# Patient Record
Sex: FEMALE | Race: BLACK OR AFRICAN AMERICAN | Employment: FULL TIME | URBAN - METROPOLITAN AREA
[De-identification: names, ages, dates, MRNs, and addresses within clinical notes are randomized per-mention and may not be internally consistent; named-entity substitution may affect disease eponyms.]

---

## 2018-12-20 ENCOUNTER — APPOINTMENT (OUTPATIENT)
Dept: CT IMAGING | Facility: HOSPITAL | Age: 23
End: 2018-12-20
Attending: EMERGENCY MEDICINE
Payer: COMMERCIAL

## 2018-12-20 ENCOUNTER — HOSPITAL ENCOUNTER (EMERGENCY)
Facility: HOSPITAL | Age: 23
Discharge: HOME | End: 2018-12-20
Attending: EMERGENCY MEDICINE
Payer: COMMERCIAL

## 2018-12-20 ENCOUNTER — HOSPITAL ENCOUNTER (OUTPATIENT)
Age: 23
Discharge: ANOTHER HOSPITAL | End: 2018-12-20
Attending: EMERGENCY MEDICINE
Payer: COMMERCIAL

## 2018-12-20 ENCOUNTER — APPOINTMENT (OUTPATIENT)
Dept: GENERAL RADIOLOGY | Age: 23
End: 2018-12-20
Attending: NURSE PRACTITIONER
Payer: COMMERCIAL

## 2018-12-20 VITALS
DIASTOLIC BLOOD PRESSURE: 80 MMHG | SYSTOLIC BLOOD PRESSURE: 116 MMHG | RESPIRATION RATE: 16 BRPM | HEIGHT: 63 IN | TEMPERATURE: 99 F | HEART RATE: 76 BPM | WEIGHT: 160 LBS | OXYGEN SATURATION: 100 % | BODY MASS INDEX: 28.35 KG/M2

## 2018-12-20 VITALS
HEIGHT: 64 IN | SYSTOLIC BLOOD PRESSURE: 129 MMHG | DIASTOLIC BLOOD PRESSURE: 40 MMHG | HEART RATE: 74 BPM | WEIGHT: 160 LBS | BODY MASS INDEX: 27.31 KG/M2 | TEMPERATURE: 98 F | OXYGEN SATURATION: 100 % | RESPIRATION RATE: 20 BRPM

## 2018-12-20 DIAGNOSIS — R07.9 ACUTE CHEST PAIN: Primary | ICD-10-CM

## 2018-12-20 DIAGNOSIS — R09.1 PLEURISY: Primary | ICD-10-CM

## 2018-12-20 PROCEDURE — 85025 COMPLETE CBC W/AUTO DIFF WBC: CPT | Performed by: NURSE PRACTITIONER

## 2018-12-20 PROCEDURE — 99284 EMERGENCY DEPT VISIT MOD MDM: CPT

## 2018-12-20 PROCEDURE — 85378 FIBRIN DEGRADE SEMIQUANT: CPT | Performed by: NURSE PRACTITIONER

## 2018-12-20 PROCEDURE — 93010 ELECTROCARDIOGRAM REPORT: CPT

## 2018-12-20 PROCEDURE — 93005 ELECTROCARDIOGRAM TRACING: CPT

## 2018-12-20 PROCEDURE — 71046 X-RAY EXAM CHEST 2 VIEWS: CPT | Performed by: NURSE PRACTITIONER

## 2018-12-20 PROCEDURE — 71275 CT ANGIOGRAPHY CHEST: CPT | Performed by: EMERGENCY MEDICINE

## 2018-12-20 PROCEDURE — 81025 URINE PREGNANCY TEST: CPT | Performed by: NURSE PRACTITIONER

## 2018-12-20 PROCEDURE — 36415 COLL VENOUS BLD VENIPUNCTURE: CPT

## 2018-12-20 PROCEDURE — 99205 OFFICE O/P NEW HI 60 MIN: CPT

## 2018-12-20 PROCEDURE — 80047 BASIC METABLC PNL IONIZED CA: CPT

## 2018-12-20 RX ORDER — IBUPROFEN 200 MG
400 TABLET ORAL ONCE
Status: COMPLETED | OUTPATIENT
Start: 2018-12-20 | End: 2018-12-20

## 2018-12-21 NOTE — ED PROVIDER NOTES
Patient Seen in: [facility]    History   Patient presents with:  Chest Pain Angina (cardiovascular)    Stated Complaint: chest pain    HPI    21-year-old female with no medical history presents with left-sided chest pain that is of motion. Neck supple. Cardiovascular: Normal rate, regular rhythm, normal heart sounds and intact distal pulses. No murmur heard. Pulmonary/Chest: Effort normal and breath sounds normal. No accessory muscle usage. No respiratory distress.    Abdomina

## 2018-12-21 NOTE — ED PROVIDER NOTES
Patient Seen in: [facility] Emergency Department    History   Patient presents with:  Abnormal Result (metabolic, cardiac)    Stated Complaint: abnormal results    HPI    29-year-old female presents here from the [facility] immediate care due to re adenopathy. Lungs: Clear to auscultation bilaterally. No wheezes, rhonchi, or rales appreciated. No accessory muscle use noted for breathing. Cardiac: Regular rate and rhythm.   Normal S1 and 2 without murmurs or ectopy appreciated  Abdomen: Soft on exa Prescribed:  There are no discharge medications for this patient.

## 2018-12-21 NOTE — ED PROVIDER NOTES
I reviewed that chart and discussed the case. I have examined the patient and noted skin rash most likely consistent with pityriasis rosea. There is no reproducible discomfort to palpation of her chest wall.   However when you range her left upper extremi

## 2018-12-21 NOTE — ED INITIAL ASSESSMENT (HPI)
Patient states left constant chest pain since yesterday  States underneath her left breast  Hurts to take a deep breath

## 2018-12-21 NOTE — ED INITIAL ASSESSMENT (HPI)
Pt sent over from [facility] for positive D-dimer. Went to IC for left sided chest pain since yesterday. Reports plane ride to [place] this past month.      Denies shortness of breath

## 2019-05-24 ENCOUNTER — APPOINTMENT (OUTPATIENT)
Dept: CT IMAGING | Facility: HOSPITAL | Age: 24
End: 2019-05-24
Attending: EMERGENCY MEDICINE
Payer: COMMERCIAL

## 2019-05-24 ENCOUNTER — HOSPITAL ENCOUNTER (OUTPATIENT)
Age: 24
Discharge: ANOTHER HOSPITAL | End: 2019-05-24
Attending: FAMILY MEDICINE
Payer: COMMERCIAL

## 2019-05-24 ENCOUNTER — HOSPITAL ENCOUNTER (EMERGENCY)
Facility: HOSPITAL | Age: 24
Discharge: HOME | End: 2019-05-24
Payer: COMMERCIAL

## 2019-05-24 ENCOUNTER — APPOINTMENT (OUTPATIENT)
Dept: GENERAL RADIOLOGY | Age: 24
End: 2019-05-24
Attending: PHYSICIAN ASSISTANT
Payer: COMMERCIAL

## 2019-05-24 VITALS
RESPIRATION RATE: 18 BRPM | HEIGHT: 64 IN | DIASTOLIC BLOOD PRESSURE: 67 MMHG | TEMPERATURE: 97 F | SYSTOLIC BLOOD PRESSURE: 101 MMHG | HEART RATE: 85 BPM | OXYGEN SATURATION: 100 % | BODY MASS INDEX: 28.68 KG/M2 | WEIGHT: 168 LBS

## 2019-05-24 VITALS
WEIGHT: 170 LBS | HEIGHT: 63.25 IN | SYSTOLIC BLOOD PRESSURE: 113 MMHG | BODY MASS INDEX: 29.75 KG/M2 | TEMPERATURE: 98 F | DIASTOLIC BLOOD PRESSURE: 70 MMHG | RESPIRATION RATE: 24 BRPM | HEART RATE: 80 BPM | OXYGEN SATURATION: 100 %

## 2019-05-24 DIAGNOSIS — R59.0 AXILLARY LYMPHADENOPATHY: ICD-10-CM

## 2019-05-24 DIAGNOSIS — R79.89 ELEVATED D-DIMER: ICD-10-CM

## 2019-05-24 DIAGNOSIS — R07.89 CHEST PAIN, ATYPICAL: Primary | ICD-10-CM

## 2019-05-24 DIAGNOSIS — R59.1 LYMPHADENOPATHY: ICD-10-CM

## 2019-05-24 DIAGNOSIS — R07.9 CHEST PAIN OF UNCERTAIN ETIOLOGY: Primary | ICD-10-CM

## 2019-05-24 PROCEDURE — 36415 COLL VENOUS BLD VENIPUNCTURE: CPT

## 2019-05-24 PROCEDURE — 99284 EMERGENCY DEPT VISIT MOD MDM: CPT

## 2019-05-24 PROCEDURE — 71046 X-RAY EXAM CHEST 2 VIEWS: CPT | Performed by: PHYSICIAN ASSISTANT

## 2019-05-24 PROCEDURE — 85025 COMPLETE CBC W/AUTO DIFF WBC: CPT | Performed by: PHYSICIAN ASSISTANT

## 2019-05-24 PROCEDURE — 93005 ELECTROCARDIOGRAM TRACING: CPT

## 2019-05-24 PROCEDURE — 80047 BASIC METABLC PNL IONIZED CA: CPT

## 2019-05-24 PROCEDURE — 99292 CRITICAL CARE ADDL 30 MIN: CPT

## 2019-05-24 PROCEDURE — 99215 OFFICE O/P EST HI 40 MIN: CPT

## 2019-05-24 PROCEDURE — 71275 CT ANGIOGRAPHY CHEST: CPT | Performed by: EMERGENCY MEDICINE

## 2019-05-24 PROCEDURE — 81025 URINE PREGNANCY TEST: CPT | Performed by: PHYSICIAN ASSISTANT

## 2019-05-24 PROCEDURE — 85378 FIBRIN DEGRADE SEMIQUANT: CPT | Performed by: PHYSICIAN ASSISTANT

## 2019-05-24 PROCEDURE — 84484 ASSAY OF TROPONIN QUANT: CPT

## 2019-05-24 PROCEDURE — 93010 ELECTROCARDIOGRAM REPORT: CPT

## 2019-05-24 RX ORDER — FAMOTIDINE 20 MG/1
20 TABLET ORAL 2 TIMES DAILY
Qty: 14 TABLET | Refills: 0 | Status: SHIPPED | OUTPATIENT
Start: 2019-05-24 | End: 2019-05-31

## 2019-05-24 RX ORDER — ASPIRIN 81 MG/1
TABLET, CHEWABLE ORAL DAILY
COMMUNITY
End: 2019-05-24

## 2019-05-24 RX ORDER — IBUPROFEN 600 MG/1
600 TABLET ORAL EVERY 8 HOURS PRN
Qty: 10 TABLET | Refills: 0 | Status: ON HOLD | OUTPATIENT
Start: 2019-05-24 | End: 2019-09-20

## 2019-05-24 NOTE — ED PROVIDER NOTES
Patient Seen in: [facility]    History   Patient presents with:  Chest Pain: Lt.-RIB    Stated Complaint: TL-Rib Pain    HPI    Patient is a 19-year-old female.   7 months prior to arrival, patient explains that she was evaluated Device None (Room air)       Current:/70   Pulse 88   Temp 97.7 °F (36.5 °C) (Temporal)   Resp 24   Ht 160.7 cm (5' 3.25\")   Wt 77.1 kg   LMP 05/14/2019 (Approximate)   SpO2 100%   BMI 29.88 kg/m²         Physical Exam    Gen: Well groomed, sitting 81  Rhythm: Sinus Rhythm  Reading: Normal sinus rhythm.   No acute ischemic changes noted         Xr Chest Pa + Lat Chest (cpt=71046)    Result Date: 5/24/2019  PROCEDURE:  XR CHEST PA + LAT CHEST (CPT=71046)  INDICATIONS:  TL-Rib Pain  COMPARISON:  BOLINGB and lymphadenopathy    Please immediately report to the [facility] emergency department.   Failure to receive higher level evaluation and care could result in permanent disability and/or death  Disposition and Plan     Clinical Impression:  Chest jasper

## 2019-05-24 NOTE — ED INITIAL ASSESSMENT (HPI)
Pt. Reports 1 week of Lt. Chest/rib pains. Constant, but certain movements and activties make it worse. States she was seen in the past 1-2 years for chest pain, had a bunch of testing, diagnosed with pleurisy.    No recent long car rides or airplane travel

## 2019-05-25 NOTE — ED PROVIDER NOTES
Patient Seen in: [facility] Emergency Department    History   Patient presents with:  Abnormal Result (metabolic, cardiac)    Stated Complaint: elevated d-dimer    HPI    62-year-old female is in the emergency department at [facility] after she Temporal   SpO2 100 %   O2 Device None (Room air)       Current:/75   Pulse 83   Temp 97.4 °F (36.3 °C) (Temporal)   Resp 16   Ht 162.6 cm (5' 4\")   Wt 76.2 kg   LMP 05/14/2019 (Approximate)   SpO2 100%   BMI 28.84 kg/m²         Physical Exam    GEN Patient complains of pain under her left breast for a week with a productive cough. Nonsmoker. FINDINGS:  LUNGS:  Airspace opacities at both lung bases. CARDIAC:  Normal size cardiac silhouette.  MEDIASTINUM:  Normal. PLEURA:  Normal.  No pleural effusio evidence of central pulmonary embolus. 2. Enlarged adenopathy, specifically numerous enlarged left axillary and mediastinal/hilar lymph nodes are seen. The dominant lymph node within the left axilla is more prominent.   These require appropriate clinical f [facility]  [phone]    Return to ER for worsened or new symptoms.         Medications Prescribed:  Current Discharge Medication List    START taking these medications    ibuprofen 600 MG Oral Tab  Take 1 tablet (600

## 2019-05-25 NOTE — ED INITIAL ASSESSMENT (HPI)
Referral from [facility] due to elevated D dimer. Went to IC due L sided chest pain x 1 week intermittent, tightness. Denies any SOB. + mild productive coughing.  Pain to chest with certain position

## 2019-06-08 ENCOUNTER — HOSPITAL ENCOUNTER (OUTPATIENT)
Dept: CT IMAGING | Age: 24
Discharge: HOME | End: 2019-06-08
Attending: NURSE PRACTITIONER
Payer: COMMERCIAL

## 2019-06-08 DIAGNOSIS — M79.10 MYALGIA: ICD-10-CM

## 2019-06-08 PROCEDURE — 71260 CT THORAX DX C+: CPT | Performed by: NURSE PRACTITIONER

## 2019-06-08 PROCEDURE — 82565 ASSAY OF CREATININE: CPT

## 2019-06-08 PROCEDURE — 74177 CT ABD & PELVIS W/CONTRAST: CPT | Performed by: NURSE PRACTITIONER

## 2019-06-28 ENCOUNTER — OFFICE VISIT (OUTPATIENT)
Dept: SURGERY | Facility: CLINIC | Age: 24
End: 2019-06-28
Payer: COMMERCIAL

## 2019-06-28 VITALS
HEART RATE: 99 BPM | WEIGHT: 170 LBS | DIASTOLIC BLOOD PRESSURE: 74 MMHG | BODY MASS INDEX: 29 KG/M2 | SYSTOLIC BLOOD PRESSURE: 119 MMHG | TEMPERATURE: 99 F

## 2019-06-28 DIAGNOSIS — R59.9 ENLARGED LYMPH NODE: Primary | ICD-10-CM

## 2019-06-28 PROCEDURE — 99243 OFF/OP CNSLTJ NEW/EST LOW 30: CPT | Performed by: SURGERY

## 2019-06-28 NOTE — H&P
New Patient Visit Note       Active Problems      1.  Enlarged lymph node        Chief Complaint   Patient presents with:  Lymph Node: referred by Dr. Brandy Oglesby had Ct showed enlarged Lymph nodes, chest,abdomen,pelvis, axilla      History of Present Illness   Pt iliac bifurcation on   image 173 measures 13 x 12 mm. BOWEL/MESENTERY:  No acute process. ABDOMINAL WALL:  Unremarkable. URINARY BLADDER:  Unremarkable.       PELVIC NODES:  Pelvic sidewall enlarged lymph nodes including on the right image 191 file.  History reviewed. No pertinent surgical history. The family history and social history have been reviewed by me today.     Family History   Problem Relation Age of Onset   • Asthma Mother    • Allergies Mother         fam hx     Social History Psychiatric/Behavioral: Negative for behavioral problems and sleep disturbance.        Physical Findings   /74   Pulse 99   Temp 98.9 °F (37.2 °C) (Oral)   Wt 170 lb   LMP 06/07/2019 (Exact Date)   BMI 29.18 kg/m²   Physical Exam   Constitutional: S

## 2019-08-11 ENCOUNTER — HOSPITAL ENCOUNTER (EMERGENCY)
Facility: HOSPITAL | Age: 24
Discharge: HOME | End: 2019-08-11
Payer: COMMERCIAL

## 2019-08-11 ENCOUNTER — APPOINTMENT (OUTPATIENT)
Dept: CT IMAGING | Facility: HOSPITAL | Age: 24
End: 2019-08-11
Payer: COMMERCIAL

## 2019-08-11 VITALS
SYSTOLIC BLOOD PRESSURE: 108 MMHG | HEART RATE: 85 BPM | TEMPERATURE: 98 F | WEIGHT: 164 LBS | DIASTOLIC BLOOD PRESSURE: 71 MMHG | BODY MASS INDEX: 29.06 KG/M2 | RESPIRATION RATE: 20 BRPM | OXYGEN SATURATION: 99 % | HEIGHT: 63 IN

## 2019-08-11 DIAGNOSIS — R10.9 ABDOMINAL PAIN, ACUTE: ICD-10-CM

## 2019-08-11 DIAGNOSIS — J90 PLEURAL EFFUSION: ICD-10-CM

## 2019-08-11 DIAGNOSIS — R07.89 CHEST PAIN, ATYPICAL: Primary | ICD-10-CM

## 2019-08-11 LAB
ANION GAP SERPL CALC-SCNC: 5 MMOL/L (ref 0–18)
BASOPHILS # BLD AUTO: 0.01 X10(3) UL (ref 0–0.2)
BASOPHILS NFR BLD AUTO: 0.3 %
BUN BLD-MCNC: 9 MG/DL (ref 7–18)
BUN/CREAT SERPL: 11.3 (ref 10–20)
CALCIUM BLD-MCNC: 8.9 MG/DL (ref 8.5–10.1)
CHLORIDE SERPL-SCNC: 108 MMOL/L (ref 98–112)
CO2 SERPL-SCNC: 24 MMOL/L (ref 21–32)
CREAT BLD-MCNC: 0.8 MG/DL (ref 0.55–1.02)
DEPRECATED RDW RBC AUTO: 43.5 FL (ref 35.1–46.3)
EOSINOPHIL # BLD AUTO: 0.05 X10(3) UL (ref 0–0.7)
EOSINOPHIL NFR BLD AUTO: 1.5 %
ERYTHROCYTE [DISTWIDTH] IN BLOOD BY AUTOMATED COUNT: 14.6 % (ref 11–15)
GLUCOSE BLD-MCNC: 87 MG/DL (ref 70–99)
HCG SERPL QL: NEGATIVE
HCT VFR BLD AUTO: 32.2 % (ref 35–48)
HGB BLD-MCNC: 10.3 G/DL (ref 12–16)
IMM GRANULOCYTES # BLD AUTO: 0.01 X10(3) UL (ref 0–1)
IMM GRANULOCYTES NFR BLD: 0.3 %
LIPASE SERPL-CCNC: 97 U/L (ref 73–393)
LYMPHOCYTES # BLD AUTO: 1.01 X10(3) UL (ref 1–4)
LYMPHOCYTES NFR BLD AUTO: 30.8 %
MCH RBC QN AUTO: 26.5 PG (ref 26–34)
MCHC RBC AUTO-ENTMCNC: 32 G/DL (ref 31–37)
MCV RBC AUTO: 82.8 FL (ref 80–100)
MONOCYTES # BLD AUTO: 0.26 X10(3) UL (ref 0.1–1)
MONOCYTES NFR BLD AUTO: 7.9 %
NEUTROPHILS # BLD AUTO: 1.94 X10 (3) UL (ref 1.5–7.7)
NEUTROPHILS # BLD AUTO: 1.94 X10(3) UL (ref 1.5–7.7)
NEUTROPHILS NFR BLD AUTO: 59.2 %
OSMOLALITY SERPL CALC.SUM OF ELEC: 282 MOSM/KG (ref 275–295)
PLATELET # BLD AUTO: 338 10(3)UL (ref 150–450)
PLATELET MORPHOLOGY: NORMAL
POTASSIUM SERPL-SCNC: 3.7 MMOL/L (ref 3.5–5.1)
RBC # BLD AUTO: 3.89 X10(6)UL (ref 3.8–5.3)
SODIUM SERPL-SCNC: 137 MMOL/L (ref 136–145)
TROPONIN I SERPL-MCNC: <0.045 NG/ML (ref ?–0.04)
WBC # BLD AUTO: 3.3 X10(3) UL (ref 4–11)

## 2019-08-11 PROCEDURE — 84703 CHORIONIC GONADOTROPIN ASSAY: CPT

## 2019-08-11 PROCEDURE — 99285 EMERGENCY DEPT VISIT HI MDM: CPT

## 2019-08-11 PROCEDURE — 96374 THER/PROPH/DIAG INJ IV PUSH: CPT

## 2019-08-11 PROCEDURE — 93010 ELECTROCARDIOGRAM REPORT: CPT

## 2019-08-11 PROCEDURE — 93005 ELECTROCARDIOGRAM TRACING: CPT

## 2019-08-11 PROCEDURE — 80048 BASIC METABOLIC PNL TOTAL CA: CPT

## 2019-08-11 PROCEDURE — 71275 CT ANGIOGRAPHY CHEST: CPT

## 2019-08-11 PROCEDURE — 83690 ASSAY OF LIPASE: CPT

## 2019-08-11 PROCEDURE — S0028 INJECTION, FAMOTIDINE, 20 MG: HCPCS

## 2019-08-11 PROCEDURE — 84484 ASSAY OF TROPONIN QUANT: CPT

## 2019-08-11 PROCEDURE — 96375 TX/PRO/DX INJ NEW DRUG ADDON: CPT

## 2019-08-11 PROCEDURE — 85025 COMPLETE CBC W/AUTO DIFF WBC: CPT

## 2019-08-11 RX ORDER — MAGNESIUM HYDROXIDE/ALUMINUM HYDROXICE/SIMETHICONE 120; 1200; 1200 MG/30ML; MG/30ML; MG/30ML
30 SUSPENSION ORAL ONCE
Status: COMPLETED | OUTPATIENT
Start: 2019-08-11 | End: 2019-08-11

## 2019-08-11 RX ORDER — HYDROMORPHONE HYDROCHLORIDE 1 MG/ML
0.5 INJECTION, SOLUTION INTRAMUSCULAR; INTRAVENOUS; SUBCUTANEOUS ONCE
Status: COMPLETED | OUTPATIENT
Start: 2019-08-11 | End: 2019-08-11

## 2019-08-11 RX ORDER — PREDNISONE 20 MG/1
60 TABLET ORAL ONCE
Status: COMPLETED | OUTPATIENT
Start: 2019-08-11 | End: 2019-08-11

## 2019-08-11 RX ORDER — OMEPRAZOLE 40 MG/1
40 CAPSULE, DELAYED RELEASE ORAL DAILY
Qty: 30 CAPSULE | Refills: 0 | Status: SHIPPED | OUTPATIENT
Start: 2019-08-11 | End: 2019-09-10

## 2019-08-11 RX ORDER — LIDOCAINE HYDROCHLORIDE 20 MG/ML
10 SOLUTION OROPHARYNGEAL ONCE
Status: COMPLETED | OUTPATIENT
Start: 2019-08-11 | End: 2019-08-11

## 2019-08-11 RX ORDER — FAMOTIDINE 10 MG/ML
20 INJECTION, SOLUTION INTRAVENOUS ONCE
Status: COMPLETED | OUTPATIENT
Start: 2019-08-11 | End: 2019-08-11

## 2019-08-11 RX ORDER — PREDNISONE 20 MG/1
60 TABLET ORAL DAILY
Qty: 12 TABLET | Refills: 0 | Status: SHIPPED | OUTPATIENT
Start: 2019-08-11 | End: 2019-08-15

## 2019-08-11 NOTE — ED PROVIDER NOTES
Patient Seen in: [facility] Emergency Department    History   Patient presents with:  Dyspnea LACEY SOB (respiratory)  Pain (neurologic)    Stated Complaint: LACEY, ARM PAIN    HPI    60-year-old in the emergency department with a complaint of shortness frequency: Never    Drug use: No             Review of Systems    Positive for stated complaint: LACEY, ARM PAIN  Other systems are as noted in HPI. Constitutional and vital signs reviewed. All other systems reviewed and negative except as noted above. Narrative: The following orders were created for panel order CBC WITH DIFFERENTIAL WITH PLATELET.   Procedure                               Abnormality         Status                     ---------                               -----------         ------ screened and evaluated during this visit. As a treating physician attending to the patient, I determined, within reasonable clinical confidence and prior to discharge, that an emergency medical condition was not or was no longer present.   There was no in

## 2019-08-11 NOTE — ED INITIAL ASSESSMENT (HPI)
Pt c/o shortness of breath for the past 3 days. Pt having epigastric pain when laying down. Pt states occasional nausea when eating.

## 2019-08-12 LAB
ATRIAL RATE: 93 BPM
P AXIS: 56 DEGREES
P-R INTERVAL: 150 MS
Q-T INTERVAL: 374 MS
QRS DURATION: 82 MS
QTC CALCULATION (BEZET): 465 MS
R AXIS: 52 DEGREES
T AXIS: 41 DEGREES
VENTRICULAR RATE: 93 BPM

## 2019-09-16 ENCOUNTER — HOSPITAL ENCOUNTER (INPATIENT)
Facility: HOSPITAL | Age: 24
LOS: 4 days | Discharge: HOME | DRG: 546 | End: 2019-09-20
Attending: EMERGENCY MEDICINE | Admitting: INTERNAL MEDICINE
Payer: COMMERCIAL

## 2019-09-16 ENCOUNTER — APPOINTMENT (OUTPATIENT)
Dept: GENERAL RADIOLOGY | Facility: HOSPITAL | Age: 24
DRG: 546 | End: 2019-09-16
Attending: PHYSICIAN ASSISTANT
Payer: COMMERCIAL

## 2019-09-16 ENCOUNTER — APPOINTMENT (OUTPATIENT)
Dept: CT IMAGING | Facility: HOSPITAL | Age: 24
DRG: 546 | End: 2019-09-16
Attending: PHYSICIAN ASSISTANT
Payer: COMMERCIAL

## 2019-09-16 DIAGNOSIS — R06.02 SHORTNESS OF BREATH: ICD-10-CM

## 2019-09-16 DIAGNOSIS — I31.3 PERICARDIAL EFFUSION: Primary | ICD-10-CM

## 2019-09-16 PROBLEM — E87.1 HYPONATREMIA: Status: ACTIVE | Noted: 2019-09-16

## 2019-09-16 PROBLEM — R73.9 HYPERGLYCEMIA: Status: ACTIVE | Noted: 2019-09-16

## 2019-09-16 PROBLEM — E87.6 HYPOKALEMIA: Status: ACTIVE | Noted: 2019-09-16

## 2019-09-16 PROBLEM — D64.9 ANEMIA: Status: ACTIVE | Noted: 2019-09-16

## 2019-09-16 PROBLEM — I31.39 PERICARDIAL EFFUSION: Status: ACTIVE | Noted: 2019-09-16

## 2019-09-16 PROCEDURE — 99285 EMERGENCY DEPT VISIT HI MDM: CPT

## 2019-09-16 PROCEDURE — 93005 ELECTROCARDIOGRAM TRACING: CPT

## 2019-09-16 PROCEDURE — 84443 ASSAY THYROID STIM HORMONE: CPT | Performed by: EMERGENCY MEDICINE

## 2019-09-16 PROCEDURE — 93010 ELECTROCARDIOGRAM REPORT: CPT

## 2019-09-16 PROCEDURE — 84484 ASSAY OF TROPONIN QUANT: CPT | Performed by: PHYSICIAN ASSISTANT

## 2019-09-16 PROCEDURE — 81001 URINALYSIS AUTO W/SCOPE: CPT | Performed by: EMERGENCY MEDICINE

## 2019-09-16 PROCEDURE — 71275 CT ANGIOGRAPHY CHEST: CPT | Performed by: PHYSICIAN ASSISTANT

## 2019-09-16 PROCEDURE — 85025 COMPLETE CBC W/AUTO DIFF WBC: CPT | Performed by: PHYSICIAN ASSISTANT

## 2019-09-16 PROCEDURE — 83880 ASSAY OF NATRIURETIC PEPTIDE: CPT | Performed by: EMERGENCY MEDICINE

## 2019-09-16 PROCEDURE — 94640 AIRWAY INHALATION TREATMENT: CPT

## 2019-09-16 PROCEDURE — 80053 COMPREHEN METABOLIC PANEL: CPT | Performed by: PHYSICIAN ASSISTANT

## 2019-09-16 PROCEDURE — 96360 HYDRATION IV INFUSION INIT: CPT

## 2019-09-16 PROCEDURE — 81025 URINE PREGNANCY TEST: CPT

## 2019-09-16 RX ORDER — OMEPRAZOLE 20 MG/1
20 CAPSULE, DELAYED RELEASE ORAL
COMMUNITY
End: 2021-03-24

## 2019-09-16 RX ORDER — PANTOPRAZOLE SODIUM 20 MG/1
20 TABLET, DELAYED RELEASE ORAL
Status: DISCONTINUED | OUTPATIENT
Start: 2019-09-17 | End: 2019-09-20

## 2019-09-16 RX ORDER — HYDROXYCHLOROQUINE SULFATE 200 MG/1
100 TABLET, FILM COATED ORAL 2 TIMES DAILY
Status: DISCONTINUED | OUTPATIENT
Start: 2019-09-16 | End: 2019-09-20

## 2019-09-16 RX ORDER — ACETAMINOPHEN 325 MG/1
650 TABLET ORAL EVERY 6 HOURS PRN
Status: DISCONTINUED | OUTPATIENT
Start: 2019-09-16 | End: 2019-09-20

## 2019-09-16 RX ORDER — ONDANSETRON 2 MG/ML
4 INJECTION INTRAMUSCULAR; INTRAVENOUS EVERY 6 HOURS PRN
Status: DISCONTINUED | OUTPATIENT
Start: 2019-09-16 | End: 2019-09-20

## 2019-09-16 RX ORDER — HYDROXYCHLOROQUINE SULFATE 200 MG/1
100 TABLET, FILM COATED ORAL 2 TIMES DAILY
COMMUNITY
End: 2021-03-24

## 2019-09-16 RX ORDER — SODIUM CHLORIDE 9 MG/ML
125 INJECTION, SOLUTION INTRAVENOUS CONTINUOUS
Status: DISCONTINUED | OUTPATIENT
Start: 2019-09-16 | End: 2019-09-20

## 2019-09-16 RX ORDER — IPRATROPIUM BROMIDE AND ALBUTEROL SULFATE 2.5; .5 MG/3ML; MG/3ML
3 SOLUTION RESPIRATORY (INHALATION) ONCE
Status: COMPLETED | OUTPATIENT
Start: 2019-09-16 | End: 2019-09-16

## 2019-09-16 NOTE — ED INITIAL ASSESSMENT (HPI)
Pt presents to the ED with complaints of shortness of breath. Pt was dx with pleural effusion in May and then last month. Pt has seen a rheumatologist and has pending dx of lupus. Pt states that her dyspnea has been increasing in past 5 days.  Pt awake and

## 2019-09-16 NOTE — ED PROVIDER NOTES
Patient Seen in: [facility] Emergency Department    History   Patient presents with:  Dyspnea GERI SOB (respiratory)    Stated Complaint: geri, sent from [place] [address] office, afebrile, denies cough    HPI    Patient is a 48-year-old female.   This past May, pat /73   Pulse 118   Resp 24   Temp 99.2 °F (37.3 °C)   Temp src Oral   SpO2 98 %   O2 Device None (Room air)       Current:/63   Pulse 107   Temp 99.2 °F (37.3 °C) (Oral)   Resp 22   Ht 160 cm (5' 3\")   Wt 73.5 kg   LMP 08/25/2019   SpO2 100% PREGNANCY, URINE - Normal   CBC WITH DIFFERENTIAL WITH PLATELET    Narrative: The following orders were created for panel order CBC WITH DIFFERENTIAL WITH PLATELET.   Procedure                               Abnormality         Status visible dissection. LUNGS:  Worsening airspace disease in the left upper lobe/lingular segment adjacent to the pericardial effusion. Slight worsening airspace disease/atelectasis left lower lobe. The right lung is clear.  MEDIASTINUM:  Stable 1 cm prevas the lingular segment and left lower lobe. Slight decrease in left effusion. 3. Stable mediastinal, axillary and hilar lymphadenopathy. The critical value results were called to Dr. Raquel Castillo at [facility] on  9/16/2019. Result read back was performed.     Luis López

## 2019-09-17 ENCOUNTER — APPOINTMENT (OUTPATIENT)
Dept: CV DIAGNOSTICS | Facility: HOSPITAL | Age: 24
DRG: 546 | End: 2019-09-17
Attending: NURSE PRACTITIONER
Payer: COMMERCIAL

## 2019-09-17 PROCEDURE — 85610 PROTHROMBIN TIME: CPT | Performed by: INTERNAL MEDICINE

## 2019-09-17 PROCEDURE — 85652 RBC SED RATE AUTOMATED: CPT | Performed by: INTERNAL MEDICINE

## 2019-09-17 PROCEDURE — 93306 TTE W/DOPPLER COMPLETE: CPT | Performed by: NURSE PRACTITIONER

## 2019-09-17 PROCEDURE — 85705 THROMBOPLASTIN INHIBITION: CPT | Performed by: INTERNAL MEDICINE

## 2019-09-17 PROCEDURE — 86480 TB TEST CELL IMMUN MEASURE: CPT | Performed by: INTERNAL MEDICINE

## 2019-09-17 PROCEDURE — 85730 THROMBOPLASTIN TIME PARTIAL: CPT | Performed by: INTERNAL MEDICINE

## 2019-09-17 PROCEDURE — 83540 ASSAY OF IRON: CPT | Performed by: INTERNAL MEDICINE

## 2019-09-17 PROCEDURE — 80053 COMPREHEN METABOLIC PANEL: CPT | Performed by: INTERNAL MEDICINE

## 2019-09-17 PROCEDURE — 85613 RUSSELL VIPER VENOM DILUTED: CPT | Performed by: INTERNAL MEDICINE

## 2019-09-17 PROCEDURE — 99255 IP/OBS CONSLTJ NEW/EST HI 80: CPT | Performed by: INTERNAL MEDICINE

## 2019-09-17 PROCEDURE — 85025 COMPLETE CBC W/AUTO DIFF WBC: CPT | Performed by: INTERNAL MEDICINE

## 2019-09-17 PROCEDURE — 85732 THROMBOPLASTIN TIME PARTIAL: CPT | Performed by: INTERNAL MEDICINE

## 2019-09-17 PROCEDURE — 82728 ASSAY OF FERRITIN: CPT | Performed by: INTERNAL MEDICINE

## 2019-09-17 RX ORDER — POTASSIUM CHLORIDE 20 MEQ/1
40 TABLET, EXTENDED RELEASE ORAL EVERY 4 HOURS
Status: COMPLETED | OUTPATIENT
Start: 2019-09-17 | End: 2019-09-17

## 2019-09-17 NOTE — DIETARY NOTE
[facility]   CLINICAL NUTRITION    Makeeta A Juan     Admitting diagnosis:  Shortness of breath [R06.02]  Pericardial effusion [I31.3]    Ht: 160 cm (5' 3\")  Wt: 74.4 kg (164 lb). This is 143% of IBW  Body mass index is 29.05 kg/m².   IBW: 52 kg    Wt

## 2019-09-17 NOTE — PROGRESS NOTES
[facility]  Progress Note    Otila Loss Patient Status:  Inpatient    1995 MRN [id number]   [facility] 8NE-A Attending Jaky Fox, [address] [facility] Se Day # 1 PCP Verena Le MD     Subjective:  LESS SHORT  OF BREATH TODAY AFTER STA of a moderate to large pericardial effusion maximum thickness 2.4 cm. 2. Worsening airspace disease/atelectasis within the lingular segment and left lower lobe. Slight decrease in left effusion. 3. Stable mediastinal, axillary and hilar lymphadenopathy. PERICARDIOCENTESIS  3. FOLLOW  UP  CBC/     Yuri Hobbs MD  9/17/2019  1:01 PM

## 2019-09-17 NOTE — PROGRESS NOTES
09/17/19 0807   Clinical Encounter Type   Visited With Patient and family together   Druze Encounters   Spiritual Requests During Visit / Hospitalization Taoist Communion   Patient Spiritual Encounters   Spiritual Interventions  provided i

## 2019-09-17 NOTE — CONSULTS
[facility]    Cardiology Consultation    Reyes Cohen Location: [address]    1995 MRN [id number]   Consulting Date 2019 [facility] [id number]   Consulting Physician Arpit Quinones MD Attending Physician Kuldeep Stahl MD heartburn  : no dysuria or hematuria  NEURO: denies headaches, focal weaknesses or paresthesias    Physical Exam:  Vital Signs: /53 (BP Location: Right arm)   Pulse 117   Temp 99.3 °F (37.4 °C) (Oral)   Resp 22   Ht 63\"   Wt 164 lb   LMP 08/25/201

## 2019-09-17 NOTE — H&P
[facility]    PATIENT'S NAME: Davina Yanez VIDHI   ATTENDING PHYSICIAN: David Escobar M.D.    PATIENT ACCOUNT#:   [de-identified]    LOCATION:  [address] [place] [address]  MEDICAL RECORD #:   [id number]       YOB: 1995  ADMISSION DATE:       09/16/2019 angiography did show development of moderate to large pericardial effusion, maximum thickness of about 2.4 cm, and worsening airspace disease and atelectasis in the left lower lobe and also decrease in the left-sided pleural effusion.   She had stable media

## 2019-09-17 NOTE — PROGRESS NOTES
[facility]  Cardiology Revisit Note    Raya Dozier Patient Status:  Inpatient    1995 MRN [id number]   [facility] 8NE-A Attending Atilio Teixeira, [address] [facility] Se Day # 1 PCP Anita Loera MD       Subjective: Pleuritic chest p Facility-Administered Medications:  methylPREDNISolone Sodium Succ (Solu-MEDROL) 1,000 mg in sodium chloride 0.9% 100 mL IVPB 1,000 mg Intravenous Q24H   Potassium Chloride ER (K-DUR M20) CR tab 40 mEq 40 mEq Oral Q4H   0.9% NaCl infusion 125 mL/hr Jacqlyn Ast resume diet    William Watson MD  9/17/2019  2:53 PM

## 2019-09-17 NOTE — PLAN OF CARE
Assumed care of patient @ 0730, A/OX4, home med list protonix and plaquenil @ home.  SOB subsided, RA, no O2 needed, ambulated with steady gait, IVF d/c. ECHO 65-70%, Dr. Arron Bautista at bedside, stated ECHO did not show need for pericardiocentesis, spoke with Dr. Valeria Ruiz

## 2019-09-17 NOTE — PROGRESS NOTES
09/16/19 2259 09/16/19 2300 09/16/19 2303   Vital Signs   /67 119/88 110/53   BP Location Right arm Right arm Right arm   BP Method Automatic Automatic Automatic   Patient Position Lying Sitting Standing       Orthostatic BP asymptomatic

## 2019-09-17 NOTE — PLAN OF CARE
Pt is A&Ox4. From home with parents. Low grade temp in ED. Ra, lungs diminished, saturating 99%. C/O pain with deep inspiration. NSR/-110s on tele, denies chest pain or dizziness. Orthostatic BP negative. Continent B&B, last BM 9/16.  Had loose stools threatening arrhythmias  - Monitor electrolytes and administer replacement therapy as ordered  Outcome: Progressing     Problem: RESPIRATORY - ADULT  Goal: Achieves optimal ventilation and oxygenation  Description  INTERVENTIONS:  - Assess for changes in r

## 2019-09-17 NOTE — ED PROVIDER NOTES
I reviewed that chart and discussed the case. I have examined the patient and noted lungs diminished breath sounds bilaterally, no wheezes, no crackles. Cardia vascular plus S1-S2 increased rate. No murmur noted. Abdomen soft nontender nondistended.   L

## 2019-09-18 ENCOUNTER — APPOINTMENT (OUTPATIENT)
Dept: CV DIAGNOSTICS | Facility: HOSPITAL | Age: 24
DRG: 546 | End: 2019-09-18
Attending: NURSE PRACTITIONER
Payer: COMMERCIAL

## 2019-09-18 PROCEDURE — 82164 ANGIOTENSIN I ENZYME TEST: CPT | Performed by: INTERNAL MEDICINE

## 2019-09-18 PROCEDURE — 83516 IMMUNOASSAY NONANTIBODY: CPT | Performed by: INTERNAL MEDICINE

## 2019-09-18 PROCEDURE — 84132 ASSAY OF SERUM POTASSIUM: CPT | Performed by: INTERNAL MEDICINE

## 2019-09-18 PROCEDURE — 86870 RBC ANTIBODY IDENTIFICATION: CPT | Performed by: INTERNAL MEDICINE

## 2019-09-18 PROCEDURE — 82085 ASSAY OF ALDOLASE: CPT | Performed by: INTERNAL MEDICINE

## 2019-09-18 PROCEDURE — 86860 RBC ANTIBODY ELUTION: CPT | Performed by: INTERNAL MEDICINE

## 2019-09-18 PROCEDURE — 86880 COOMBS TEST DIRECT: CPT | Performed by: INTERNAL MEDICINE

## 2019-09-18 PROCEDURE — 99232 SBSQ HOSP IP/OBS MODERATE 35: CPT | Performed by: NURSE PRACTITIONER

## 2019-09-18 NOTE — PROGRESS NOTES
[facility]  Progress Note    Megan Hung Patient Status:  Inpatient    1995 MRN [id number]   [facility] 8NE-A Attending Sonya Patel, [address] [facility] Se Day # 2 PCP Tomasa Antonio MD     Subjective:  Feels better today after starting iv Pantoprazole Sodium (PROTONIX) EC tab 20 mg, 20 mg, Oral, QAM AC  Imaging:  Ct Angiography, Chest (cpt=71275)    Result Date: 9/16/2019  CONCLUSION:  1.  Development of a moderate to large pericardial effusion maximum thickness 2.4 cm. 2. Worsening airspace

## 2019-09-18 NOTE — PROGRESS NOTES
[facility]  Cardiology Progress Note    Shelly Eaton Patient Status:  Inpatient    1995 MRN [id number]   [facility] 8NE-A Attending Michael Clements,  [facility] Se Day # 2 PCP Vanessa Llanes MD     Subjective:  Patient states that breath

## 2019-09-18 NOTE — PLAN OF CARE
Alert. Oriented. Sr per tele. O2 sat on room air while sleeping 98%. Denies pain, sob. Ambulated in halls. Tolerated well. Steady gait. Scds in place. Poc discussed with patient. Cont. to monitor pt.

## 2019-09-18 NOTE — CONSULTS
22 yo lady seen by me for the first time 10 days ago for SLE. She was admitted for SOB. This started 9.13.19 and got worse 9.16.19. On 9.13.19 she had nausea and vomited bile. Was having palpitations, had SOB and had centrally located CP.  She had no cough, and cardiology. Cardiology mentioned that there is no need for pericardiocentesis. Has proteinuria, will repeat UA. Anti Smooth muscle anb and mitochondrial anb pending. Ordered because of higher bilirubin level. C3 and C4 pending, ACE.  Will check lupus

## 2019-09-18 NOTE — PLAN OF CARE
Pt. Resting comfortably in bed. Aox4. Oxygenating 100% on room air. Sinus tachy 110s on tele. Up ad luann. Complains of mild pain on inhalation but denies need for pain medication at this time. Continuing Solu-medrol IV therapy.  Pt. Updated on plan of care, Assess for changes in respiratory status  - Assess for changes in mentation and behavior  - Position to facilitate oxygenation and minimize respiratory effort  - Oxygen supplementation based on oxygen saturation or ABGs  - Provide Smoking Cessation handout

## 2019-09-19 PROCEDURE — 87340 HEPATITIS B SURFACE AG IA: CPT | Performed by: INTERNAL MEDICINE

## 2019-09-19 PROCEDURE — 87798 DETECT AGENT NOS DNA AMP: CPT | Performed by: INTERNAL MEDICINE

## 2019-09-19 PROCEDURE — 83615 LACTATE (LD) (LDH) ENZYME: CPT | Performed by: INTERNAL MEDICINE

## 2019-09-19 PROCEDURE — 80076 HEPATIC FUNCTION PANEL: CPT | Performed by: INTERNAL MEDICINE

## 2019-09-19 PROCEDURE — 85025 COMPLETE CBC W/AUTO DIFF WBC: CPT | Performed by: INTERNAL MEDICINE

## 2019-09-19 PROCEDURE — 99232 SBSQ HOSP IP/OBS MODERATE 35: CPT | Performed by: INTERNAL MEDICINE

## 2019-09-19 PROCEDURE — 80048 BASIC METABOLIC PNL TOTAL CA: CPT | Performed by: INTERNAL MEDICINE

## 2019-09-19 PROCEDURE — 86803 HEPATITIS C AB TEST: CPT | Performed by: INTERNAL MEDICINE

## 2019-09-19 RX ORDER — PREDNISONE 20 MG/1
40 TABLET ORAL
Status: DISCONTINUED | OUTPATIENT
Start: 2019-09-20 | End: 2019-09-20

## 2019-09-19 NOTE — PROGRESS NOTES
Patient feels better today. Ambulates without SOB. No CP.  No cough    PE HR 69, RR 18, /61  No rash  No leg edema  Heart S1 S2 RR, no rub  No PIP or MCP tenderness    Labs: Hgb 9, WBC 14.5 Plt 406  Quantiferon neg  Arron anb pos    Assessment and pl

## 2019-09-19 NOTE — PROGRESS NOTES
[facility]  Cardiology Progress Note    Jairo Raya Patient Status:  Inpatient    1995 MRN [id number]   [facility] 8NE-A Attending Adore Brownlee,  [facility] Se Day # 3 PCP Leopoldo Trejo MD     Subjective:  Feeling much better today. AM      =======================================================  Patient seen and examined independently. Note reviewed and labs reviewed. Agree with above assessment and plan. No events overnight.   Sharp chest discomfort and pleuritic chest pain improv

## 2019-09-19 NOTE — PLAN OF CARE
Alert. Oriented. Sr per tele. Denies pain. Resting comfortably. Scds in place. Poc updated w/ pt. Voiced understanding and awareness. Cont. to monitor pt.

## 2019-09-19 NOTE — PROGRESS NOTES
[facility]  Progress Note    Chayo Alfredo Patient Status:  Inpatient    1995 MRN [id number]   [facility] 8NE-A Attending Tucker Haines,  [facility] [address] Se Day # 3 PCP Marck Argueta MD     Subjective:  Less short of breath / feels  Better 20 mg, 20 mg, Oral, QAM AC  Imaging:  Ct Angiography, Chest (cpt=71275)    Result Date: 9/16/2019  CONCLUSION:  1.  Development of a moderate to large pericardial effusion maximum thickness 2.4 cm. 2. Worsening airspace disease/atelectasis within the lingul

## 2019-09-19 NOTE — PAYOR COMM NOTE
--------------  CONTINUED STAY REVIEW----REQUESTING ADDITIONAL DAY 9/19      Payor: [facility]  Subscriber #:  [id number]  Authorization Number: [id number]    Admit date: 9/16/19  Admit time: 2241    Admitting Physician: Vern Miramontes MD  Attending Physic Medications:     Current Facility-Administered Medications:   •  methylPREDNISolone Sodium Succ (Solu-MEDROL) 1,000 mg in sodium chloride 0.9% 100 mL IVPB, 1,000 mg, Intravenous, Q24H  •  0.9% NaCl infusion, 125 mL/hr, Intravenous, Continuous  •  ondansetr Need for prophylactic vaccination and inoculation against other specified disease     Enlarged lymph node     Hyponatremia     Hypokalemia     Anemia     Hyperglycemia     Pericardial effusion     Shortness of breath              Plan:  1.  CONTINUE IV S

## 2019-09-19 NOTE — PROGRESS NOTES
Patient feeling better. Gets mild CP with deep breaths. No headaches. Has a mild dry cough.     PE: , RR  18, /69  No rash  A, O X 3  Lungs CTA  Heart S1 S2 RR, no rub  No leg edema  OM WNL    Labs: PTT 35    Assessment and plan  Patient with S

## 2019-09-19 NOTE — PLAN OF CARE
Assumed care of patient @ 0730, A/OX4, aware of POC, up ad luann, RA. Lungs clear, NSR 's on tele, denies SOB or CP, only feels CP with deep breathing, consults aware, declined pain meds. Last dose of IV solu-medral given today.  Lab tests pending per r

## 2019-09-20 ENCOUNTER — APPOINTMENT (OUTPATIENT)
Dept: CV DIAGNOSTICS | Facility: HOSPITAL | Age: 24
DRG: 546 | End: 2019-09-20
Attending: INTERNAL MEDICINE
Payer: COMMERCIAL

## 2019-09-20 VITALS
BODY MASS INDEX: 29.46 KG/M2 | RESPIRATION RATE: 18 BRPM | WEIGHT: 166.25 LBS | SYSTOLIC BLOOD PRESSURE: 124 MMHG | DIASTOLIC BLOOD PRESSURE: 73 MMHG | OXYGEN SATURATION: 99 % | HEIGHT: 63 IN | TEMPERATURE: 98 F | HEART RATE: 106 BPM

## 2019-09-20 PROCEDURE — 80048 BASIC METABOLIC PNL TOTAL CA: CPT | Performed by: INTERNAL MEDICINE

## 2019-09-20 PROCEDURE — 99231 SBSQ HOSP IP/OBS SF/LOW 25: CPT | Performed by: INTERNAL MEDICINE

## 2019-09-20 PROCEDURE — 85025 COMPLETE CBC W/AUTO DIFF WBC: CPT | Performed by: INTERNAL MEDICINE

## 2019-09-20 PROCEDURE — 93308 TTE F-UP OR LMTD: CPT | Performed by: INTERNAL MEDICINE

## 2019-09-20 RX ORDER — PREDNISONE 20 MG/1
40 TABLET ORAL
Qty: 30 TABLET | Refills: 0 | Status: SHIPPED | OUTPATIENT
Start: 2019-09-21 | End: 2021-03-24

## 2019-09-20 RX ORDER — PREDNISONE 20 MG/1
40 TABLET ORAL
Qty: 15 TABLET | Refills: 0 | Status: SHIPPED | OUTPATIENT
Start: 2019-09-21 | End: 2019-09-20

## 2019-09-20 NOTE — PLAN OF CARE
Tele monitoring. I/o. Possible discharge in am. Encouraged pt to ambulate in hallway to see how she feels prior to discharge in am. Information given to pt regarding plaquenil and prednisone. Pt will be discharge on prednisone.   Limited echo in am to evalu Achieves optimal ventilation and oxygenation  Description  INTERVENTIONS:  - Assess for changes in respiratory status  - Assess for changes in mentation and behavior  - Position to facilitate oxygenation and minimize respiratory effort  - Oxygen supplement

## 2019-09-20 NOTE — PROGRESS NOTES
· [facility]      Subjective:  Up in chair no dyspnea no other new issues    Objective:  117/79  Afebrile SR, SB with sleep  I/O - 1435    Neuro: awake/alert  HEENT: no JVD  Cardiac: S1 S2 regular  Lungs: clear  Abdomen: soft  Extremities: no

## 2019-09-20 NOTE — PROGRESS NOTES
Orders received for d/c to home per cards, rheum and Dr. Kala Che  Will d/c to home today and follow up with doctors as ordered

## 2019-09-20 NOTE — PLAN OF CARE
Pt denies c/o pain. VS WDL. A/Ox4. Ra. Clear breath sounds auscultated bilat. NSR. S1, S2 auscultated. Bowel sounds present in all four quadrants. Pt is ambulating with no difficulty. Medications tolerated well.  ECHO in am. Plan to D/C home today when [place] Problem: RESPIRATORY - ADULT  Goal: Achieves optimal ventilation and oxygenation  Description  INTERVENTIONS:  - Assess for changes in respiratory status  - Assess for changes in mentation and behavior  - Position to facilitate oxygenation and minimize r

## 2019-09-21 NOTE — PROGRESS NOTES
D/c to home in stable condition  Tele and IV removed without difficulty  All d/c instructions including meds and follow up care were reviewed with pt and family and they all verbalized an understanding of care.   Patient will call MD'S on Monday for follow

## 2019-09-23 NOTE — PAYOR COMM NOTE
--------------  DISCHARGE REVIEW    Payor: [employer]  Subscriber #:  [id number]  Authorization Number: [id number]    Admit date: 9/16/19  Admit time:  2241  Discharge Date: 9/20/2019  6:53 PM     Admitting Physician: Katya Nair MD  Attending Physician

## 2019-09-29 NOTE — DISCHARGE SUMMARY
[facility]    PATIENT'S NAME: Paula Galvez VIDHI   ATTENDING PHYSICIAN: Jaziel Mcdonnell M.D.    PATIENT ACCOUNT#:   [de-identified]    LOCATION:  [address] [place] [address]  MEDICAL RECORD #:   [id number]       YOB: 1995  ADMISSION DATE:       09/16/2019

## 2019-10-03 RX ORDER — OMEPRAZOLE 20 MG/1
20 CAPSULE, DELAYED RELEASE ORAL
COMMUNITY

## 2019-10-03 RX ORDER — PREDNISONE 20 MG/1
40 TABLET ORAL
COMMUNITY
Start: 2019-09-21

## 2019-10-03 RX ORDER — HYDROXYCHLOROQUINE SULFATE 200 MG/1
100 TABLET, FILM COATED ORAL
COMMUNITY
End: 2020-12-28

## 2019-10-04 ENCOUNTER — OFFICE VISIT (OUTPATIENT)
Dept: CARDIOLOGY | Age: 24
End: 2019-10-04

## 2019-10-04 VITALS
SYSTOLIC BLOOD PRESSURE: 110 MMHG | WEIGHT: 172 LBS | BODY MASS INDEX: 29.37 KG/M2 | DIASTOLIC BLOOD PRESSURE: 70 MMHG | HEART RATE: 84 BPM | HEIGHT: 64 IN

## 2019-10-04 DIAGNOSIS — I31.39 PERICARDIAL EFFUSION: Primary | ICD-10-CM

## 2019-10-04 PROCEDURE — 99215 OFFICE O/P EST HI 40 MIN: CPT | Performed by: INTERNAL MEDICINE

## 2019-10-04 RX ORDER — MYCOPHENOLATE MOFETIL 500 MG/1
TABLET ORAL 2 TIMES DAILY
COMMUNITY
End: 2021-09-02

## 2019-10-04 ASSESSMENT — PATIENT HEALTH QUESTIONNAIRE - PHQ9
1. LITTLE INTEREST OR PLEASURE IN DOING THINGS: NOT AT ALL
2. FEELING DOWN, DEPRESSED OR HOPELESS: NOT AT ALL
SUM OF ALL RESPONSES TO PHQ9 QUESTIONS 1 AND 2: 0
SUM OF ALL RESPONSES TO PHQ9 QUESTIONS 1 AND 2: 0

## 2019-10-04 ASSESSMENT — ENCOUNTER SYMPTOMS
CHILLS: 0
SUSPICIOUS LESIONS: 0
BRUISES/BLEEDS EASILY: 0
COUGH: 0
HEMATOCHEZIA: 0
WEIGHT LOSS: 0
HEMOPTYSIS: 0
ALLERGIC/IMMUNOLOGIC COMMENTS: NO NEW FOOD ALLERGIES
FEVER: 0
WEIGHT GAIN: 0

## 2020-01-10 ENCOUNTER — APPOINTMENT (OUTPATIENT)
Dept: CARDIOLOGY | Age: 25
End: 2020-01-10

## 2020-01-14 ENCOUNTER — HOSPITAL ENCOUNTER (OUTPATIENT)
Dept: CV DIAGNOSTICS | Facility: HOSPITAL | Age: 25
Discharge: HOME | End: 2020-01-14
Attending: INTERNAL MEDICINE
Payer: COMMERCIAL

## 2020-01-14 DIAGNOSIS — I31.3 PERICARDIAL EFFUSION: ICD-10-CM

## 2020-01-14 PROCEDURE — 93308 TTE F-UP OR LMTD: CPT | Performed by: INTERNAL MEDICINE

## 2020-01-14 PROCEDURE — 93325 DOPPLER ECHO COLOR FLOW MAPG: CPT | Performed by: INTERNAL MEDICINE

## 2020-01-15 DIAGNOSIS — I31.39 PERICARDIAL EFFUSION: ICD-10-CM

## 2020-02-14 ENCOUNTER — OFFICE VISIT (OUTPATIENT)
Dept: CARDIOLOGY | Age: 25
End: 2020-02-14

## 2020-02-14 VITALS
DIASTOLIC BLOOD PRESSURE: 56 MMHG | SYSTOLIC BLOOD PRESSURE: 86 MMHG | HEART RATE: 79 BPM | HEIGHT: 64 IN | BODY MASS INDEX: 30.22 KG/M2 | WEIGHT: 177 LBS

## 2020-02-14 DIAGNOSIS — I31.39 PERICARDIAL EFFUSION: Primary | ICD-10-CM

## 2020-02-14 PROCEDURE — 99215 OFFICE O/P EST HI 40 MIN: CPT | Performed by: INTERNAL MEDICINE

## 2020-02-14 RX ORDER — PREDNISONE 10 MG/1
10 TABLET ORAL DAILY
COMMUNITY
End: 2021-03-18

## 2020-02-14 ASSESSMENT — PATIENT HEALTH QUESTIONNAIRE - PHQ9
SUM OF ALL RESPONSES TO PHQ9 QUESTIONS 1 AND 2: 0
2. FEELING DOWN, DEPRESSED OR HOPELESS: NOT AT ALL
1. LITTLE INTEREST OR PLEASURE IN DOING THINGS: NOT AT ALL
SUM OF ALL RESPONSES TO PHQ9 QUESTIONS 1 AND 2: 0

## 2020-02-14 ASSESSMENT — ENCOUNTER SYMPTOMS
BRUISES/BLEEDS EASILY: 0
CHILLS: 0
SUSPICIOUS LESIONS: 0
ALLERGIC/IMMUNOLOGIC COMMENTS: NO NEW FOOD ALLERGIES
FEVER: 0
WEIGHT GAIN: 0
HEMATOCHEZIA: 0
COUGH: 0
HEMOPTYSIS: 0
WEIGHT LOSS: 0

## 2020-05-22 ENCOUNTER — TELEPHONE (OUTPATIENT)
Dept: CARDIOLOGY | Age: 25
End: 2020-05-22

## 2020-10-05 RX ORDER — COLCHICINE 0.6 MG/1
TABLET ORAL
Qty: 60 TABLET | Refills: 3 | Status: SHIPPED | OUTPATIENT
Start: 2020-10-05

## 2020-12-28 RX ORDER — HYDROXYCHLOROQUINE SULFATE 200 MG/1
TABLET, FILM COATED ORAL
Qty: 180 TABLET | Refills: 0 | Status: SHIPPED | OUTPATIENT
Start: 2020-12-28

## 2021-01-28 RX ORDER — COLCHICINE 0.6 MG/1
TABLET ORAL
Qty: 180 TABLET | OUTPATIENT
Start: 2021-01-28

## 2021-03-18 RX ORDER — PREDNISONE 10 MG/1
TABLET ORAL
Qty: 30 TABLET | Refills: 3 | Status: SHIPPED | OUTPATIENT
Start: 2021-03-18 | End: 2021-07-15

## 2021-03-24 ENCOUNTER — OFFICE VISIT (OUTPATIENT)
Dept: INTERNAL MEDICINE CLINIC | Facility: CLINIC | Age: 26
End: 2021-03-24
Payer: COMMERCIAL

## 2021-03-24 VITALS
RESPIRATION RATE: 12 BRPM | DIASTOLIC BLOOD PRESSURE: 70 MMHG | BODY MASS INDEX: 30.01 KG/M2 | OXYGEN SATURATION: 98 % | WEIGHT: 169.38 LBS | TEMPERATURE: 98 F | HEIGHT: 63 IN | HEART RATE: 97 BPM | SYSTOLIC BLOOD PRESSURE: 102 MMHG

## 2021-03-24 DIAGNOSIS — M32.12 SYSTEMIC LUPUS ERYTHEMATOSUS (SLE) WITH PERICARDITIS, UNSPECIFIED SLE TYPE (HCC): ICD-10-CM

## 2021-03-24 DIAGNOSIS — M79.10 MUSCLE PAIN: ICD-10-CM

## 2021-03-24 DIAGNOSIS — Z00.00 ROUTINE PHYSICAL EXAMINATION: Primary | ICD-10-CM

## 2021-03-24 PROBLEM — M32.9 LUPUS (SYSTEMIC LUPUS ERYTHEMATOSUS) (HCC): Status: ACTIVE | Noted: 2021-03-24

## 2021-03-24 PROCEDURE — 3078F DIAST BP <80 MM HG: CPT | Performed by: INTERNAL MEDICINE

## 2021-03-24 PROCEDURE — 99385 PREV VISIT NEW AGE 18-39: CPT | Performed by: INTERNAL MEDICINE

## 2021-03-24 PROCEDURE — 3008F BODY MASS INDEX DOCD: CPT | Performed by: INTERNAL MEDICINE

## 2021-03-24 PROCEDURE — 3074F SYST BP LT 130 MM HG: CPT | Performed by: INTERNAL MEDICINE

## 2021-03-24 RX ORDER — MYCOPHENOLATE MOFETIL 500 MG/1
2 TABLET ORAL 2 TIMES DAILY
COMMUNITY
Start: 2019-10-01 | End: 2021-11-02

## 2021-03-24 RX ORDER — HYDROXYCHLOROQUINE SULFATE 200 MG/1
1 TABLET, FILM COATED ORAL 2 TIMES DAILY
COMMUNITY
Start: 2020-12-28

## 2021-03-24 RX ORDER — COLCHICINE 0.6 MG/1
0.6 TABLET ORAL 2 TIMES DAILY
COMMUNITY
Start: 2021-03-22

## 2021-03-24 RX ORDER — PREDNISONE 10 MG/1
10 TABLET ORAL DAILY
COMMUNITY
Start: 2021-03-18

## 2021-03-24 NOTE — PROGRESS NOTES
Patient Office Visit    ASSESSMENT AND PLAN:   (Z00.00) Routine physical examination  (primary encounter diagnosis)  Plan: ALT (SGPT), AST (SGOT), BASIC METABOLIC PANEL         (8), CBC WITH DIFFERENTIAL WITH PLATELET,         HEMOGLOBIN A1C, LIPID PANEL, Standing Status: Future          Standing Expiration Date: 3/24/2022      Basic Metabolic Panel (8) [E]          Standing Status: Future          Standing Expiration Date: 3/24/2022      CBC W Differential W Platelet [E]          Standing Status: Future MG Oral Tab, Take 2 tablets by mouth 2 (two) times daily. , Disp: , Rfl:   Hydroxychloroquine Sulfate 200 MG Oral Tab, Take 1 tablet by mouth 2 (two) times a day., Disp: , Rfl:   predniSONE 10 MG Oral Tab, Take 10 mg by mouth daily. , Disp: , Rfl:     No cur

## 2021-03-24 NOTE — PATIENT INSTRUCTIONS
[address] will contact you once you are meet criteria for the vaccine. Otherwise, you can try [facility] or any other pharmacy to see if you can get a vaccine over there  Please provide records regarding your last pap smear    See you in 1 year.

## 2021-04-27 ENCOUNTER — LAB ENCOUNTER (OUTPATIENT)
Dept: LAB | Age: 26
End: 2021-04-27
Attending: INTERNAL MEDICINE
Payer: COMMERCIAL

## 2021-04-27 DIAGNOSIS — Z00.00 ROUTINE PHYSICAL EXAMINATION: ICD-10-CM

## 2021-04-27 PROCEDURE — 80048 BASIC METABOLIC PNL TOTAL CA: CPT | Performed by: INTERNAL MEDICINE

## 2021-04-27 PROCEDURE — 80061 LIPID PANEL: CPT | Performed by: INTERNAL MEDICINE

## 2021-04-27 PROCEDURE — 84460 ALANINE AMINO (ALT) (SGPT): CPT | Performed by: INTERNAL MEDICINE

## 2021-04-27 PROCEDURE — 86803 HEPATITIS C AB TEST: CPT | Performed by: INTERNAL MEDICINE

## 2021-04-27 PROCEDURE — 82306 VITAMIN D 25 HYDROXY: CPT | Performed by: INTERNAL MEDICINE

## 2021-04-27 PROCEDURE — 84443 ASSAY THYROID STIM HORMONE: CPT | Performed by: INTERNAL MEDICINE

## 2021-04-27 PROCEDURE — 83036 HEMOGLOBIN GLYCOSYLATED A1C: CPT | Performed by: INTERNAL MEDICINE

## 2021-04-27 PROCEDURE — 85025 COMPLETE CBC W/AUTO DIFF WBC: CPT | Performed by: INTERNAL MEDICINE

## 2021-04-27 PROCEDURE — 84450 TRANSFERASE (AST) (SGOT): CPT | Performed by: INTERNAL MEDICINE

## 2021-04-30 NOTE — PROGRESS NOTES
Dear RN, please let the patient know , yes mycophenolate can cause hyperglycemia and prediabetes.  [address] DO

## 2021-05-08 ENCOUNTER — LAB ENCOUNTER (OUTPATIENT)
Dept: LAB | Age: 26
End: 2021-05-08
Attending: INTERNAL MEDICINE
Payer: COMMERCIAL

## 2021-06-12 ENCOUNTER — LAB ENCOUNTER (OUTPATIENT)
Dept: LAB | Age: 26
End: 2021-06-12
Attending: INTERNAL MEDICINE
Payer: COMMERCIAL

## 2021-06-12 DIAGNOSIS — M32.9 SYSTEMIC LUPUS ERYTHEMATOSUS (HCC): Primary | ICD-10-CM

## 2021-06-12 PROCEDURE — 86038 ANTINUCLEAR ANTIBODIES: CPT

## 2021-06-12 PROCEDURE — 86225 DNA ANTIBODY NATIVE: CPT

## 2021-06-12 PROCEDURE — 81003 URINALYSIS AUTO W/O SCOPE: CPT

## 2021-06-12 PROCEDURE — 86160 COMPLEMENT ANTIGEN: CPT

## 2021-06-12 PROCEDURE — 86235 NUCLEAR ANTIGEN ANTIBODY: CPT

## 2021-06-12 PROCEDURE — 85652 RBC SED RATE AUTOMATED: CPT

## 2021-06-12 PROCEDURE — 36415 COLL VENOUS BLD VENIPUNCTURE: CPT

## 2021-06-12 PROCEDURE — 85025 COMPLETE CBC W/AUTO DIFF WBC: CPT

## 2021-06-12 PROCEDURE — 86704 HEP B CORE ANTIBODY TOTAL: CPT

## 2021-06-12 PROCEDURE — 80053 COMPREHEN METABOLIC PANEL: CPT

## 2021-07-15 RX ORDER — PREDNISONE 10 MG/1
TABLET ORAL
Qty: 30 TABLET | Refills: 3 | Status: SHIPPED | OUTPATIENT
Start: 2021-07-15

## 2021-09-02 RX ORDER — MYCOPHENOLATE MOFETIL 500 MG/1
TABLET ORAL
Qty: 120 TABLET | Refills: 0 | Status: SHIPPED | OUTPATIENT
Start: 2021-09-02

## 2021-11-01 RX ORDER — COLCHICINE 0.6 MG/1
TABLET ORAL
Qty: 60 TABLET | Refills: 3 | OUTPATIENT
Start: 2021-11-01

## 2021-11-02 ENCOUNTER — OFFICE VISIT (OUTPATIENT)
Dept: INTERNAL MEDICINE CLINIC | Facility: CLINIC | Age: 26
End: 2021-11-02
Payer: COMMERCIAL

## 2021-11-02 VITALS
HEART RATE: 91 BPM | DIASTOLIC BLOOD PRESSURE: 60 MMHG | HEIGHT: 63 IN | SYSTOLIC BLOOD PRESSURE: 100 MMHG | WEIGHT: 170 LBS | BODY MASS INDEX: 30.12 KG/M2 | TEMPERATURE: 98 F | OXYGEN SATURATION: 99 % | RESPIRATION RATE: 14 BRPM

## 2021-11-02 DIAGNOSIS — I31.3 PERICARDIAL EFFUSION: ICD-10-CM

## 2021-11-02 DIAGNOSIS — T78.1XXA GASTROINTESTINAL FOOD SENSITIVITY: Primary | ICD-10-CM

## 2021-11-02 DIAGNOSIS — M32.12 SYSTEMIC LUPUS ERYTHEMATOSUS (SLE) WITH PERICARDITIS, UNSPECIFIED SLE TYPE (HCC): ICD-10-CM

## 2021-11-02 PROBLEM — E87.6 HYPOKALEMIA: Status: RESOLVED | Noted: 2019-09-16 | Resolved: 2021-11-02

## 2021-11-02 PROBLEM — E87.1 HYPONATREMIA: Status: RESOLVED | Noted: 2019-09-16 | Resolved: 2021-11-02

## 2021-11-02 PROBLEM — R06.02 SHORTNESS OF BREATH: Status: RESOLVED | Noted: 2019-09-16 | Resolved: 2021-11-02

## 2021-11-02 PROCEDURE — 90471 IMMUNIZATION ADMIN: CPT | Performed by: INTERNAL MEDICINE

## 2021-11-02 PROCEDURE — 3078F DIAST BP <80 MM HG: CPT | Performed by: INTERNAL MEDICINE

## 2021-11-02 PROCEDURE — 3074F SYST BP LT 130 MM HG: CPT | Performed by: INTERNAL MEDICINE

## 2021-11-02 PROCEDURE — 90686 IIV4 VACC NO PRSV 0.5 ML IM: CPT | Performed by: INTERNAL MEDICINE

## 2021-11-02 PROCEDURE — 3008F BODY MASS INDEX DOCD: CPT | Performed by: INTERNAL MEDICINE

## 2021-11-02 PROCEDURE — 99214 OFFICE O/P EST MOD 30 MIN: CPT | Performed by: INTERNAL MEDICINE

## 2021-11-02 RX ORDER — FAMOTIDINE 20 MG/1
1 TABLET ORAL NIGHTLY
COMMUNITY
End: 2021-11-02

## 2021-11-02 RX ORDER — IRON, FOLIC ACID, CYANOCOBALAMIN, ASCORBIC ACID, AND DOCUSATE SODIUM 90; 1; 12; 120; 50 MG/1; MG/1; UG/1; MG/1; MG/1
TABLET, FILM COATED ORAL
COMMUNITY
End: 2021-11-02

## 2021-11-02 RX ORDER — CELECOXIB 200 MG/1
CAPSULE ORAL
COMMUNITY
Start: 2021-09-28 | End: 2021-12-29

## 2021-11-02 NOTE — PATIENT INSTRUCTIONS
Please call the specialists below  Also schedule an appointment for your physical in April   Please take 2000 IU of vitamin D daily

## 2021-11-02 NOTE — PROGRESS NOTES
Patient Office Visit    ASSESSMENT AND PLAN:   (T78.1XXA) Gastrointestinal food sensitivity  (primary encounter diagnosis)  Plan: ALLERGY - INTERNAL  Note: referral placed     (M32.12) Systemic lupus erythematosus (SLE) with pericarditis, unspecified SLE t taking prednisone and hydroxychloroquine    2.  Food allergies:   - patient has been concerned about food     Sensitivity/allergies   - feels like certain foods would give her      Headaches and sometimes cause a flare     Of her lupus   - would like to see normal behavior     /60 (BP Location: Left arm, Patient Position: Sitting, Cuff Size: adult)   Pulse 91   Temp 98.4 °F (36.9 °C) (Oral)   Resp 14   Ht 5' 3\" (1.6 m)   Wt 170 lb (77.1 kg)   LMP 10/27/2021   SpO2 99%   BMI 30.11 kg/m²     PHYSICAL EXA

## 2021-12-29 ENCOUNTER — OFFICE VISIT (OUTPATIENT)
Dept: RHEUMATOLOGY | Facility: CLINIC | Age: 26
End: 2021-12-29
Payer: COMMERCIAL

## 2021-12-29 VITALS
RESPIRATION RATE: 16 BRPM | BODY MASS INDEX: 30.65 KG/M2 | HEART RATE: 78 BPM | HEIGHT: 63 IN | TEMPERATURE: 98 F | WEIGHT: 173 LBS | OXYGEN SATURATION: 99 % | DIASTOLIC BLOOD PRESSURE: 62 MMHG | SYSTOLIC BLOOD PRESSURE: 108 MMHG

## 2021-12-29 DIAGNOSIS — E55.9 VITAMIN D DEFICIENCY: ICD-10-CM

## 2021-12-29 DIAGNOSIS — R76.8 ANA POSITIVE: ICD-10-CM

## 2021-12-29 DIAGNOSIS — Z79.52 LONG TERM (CURRENT) USE OF SYSTEMIC STEROIDS: ICD-10-CM

## 2021-12-29 DIAGNOSIS — M54.2 NECK PAIN: ICD-10-CM

## 2021-12-29 DIAGNOSIS — I31.3 PERICARDIAL EFFUSION: ICD-10-CM

## 2021-12-29 DIAGNOSIS — M32.12 SYSTEMIC LUPUS ERYTHEMATOSUS (SLE) WITH PERICARDITIS, UNSPECIFIED SLE TYPE (HCC): Primary | ICD-10-CM

## 2021-12-29 DIAGNOSIS — R76.8 DS DNA ANTIBODY POSITIVE: ICD-10-CM

## 2021-12-29 PROCEDURE — 99245 OFF/OP CONSLTJ NEW/EST HI 55: CPT | Performed by: INTERNAL MEDICINE

## 2021-12-29 PROCEDURE — 3074F SYST BP LT 130 MM HG: CPT | Performed by: INTERNAL MEDICINE

## 2021-12-29 PROCEDURE — 3078F DIAST BP <80 MM HG: CPT | Performed by: INTERNAL MEDICINE

## 2021-12-29 PROCEDURE — 3008F BODY MASS INDEX DOCD: CPT | Performed by: INTERNAL MEDICINE

## 2021-12-29 RX ORDER — MYCOPHENOLATE MOFETIL 500 MG/1
TABLET ORAL
COMMUNITY
Start: 2021-12-27 | End: 2021-12-29

## 2021-12-29 RX ORDER — BELIMUMAB 200 MG/ML
SOLUTION SUBCUTANEOUS
COMMUNITY
Start: 2021-12-03

## 2021-12-29 NOTE — PROGRESS NOTES
RHEUMATOLOGY NEW PATIENT   Date of visit: 12/29/2021  ? Patient presents with:  Establish Care: new pt. Previous rheum dr. Tamar Bond. Diagnosed with lupus in 2019. Currently has joint pain and fatigue. Had pericarditis when diagnosed. No rashes.  Some ha change in medication at this time and she will follow up in 2 weeks to discuss results and next steps further. We may need to get updated CT chest and abdomen to evaluate the lungs, heart as well as prior lymphadenopathy.      Patient verbalized Gypsy Stain Future  -     LUPUS ANTICOAGULANT/ANTIPHOSPHOLIPID PANEL; Future    Ds DNA antibody positive  -     CBC WITH DIFFERENTIAL WITH PLATELET; Future  -     COMP METABOLIC PANEL (14);  Future  -     C-REACTIVE PROTEIN; Future  -     SED RATE, DIANAREN (AUTOMATE of breath. Had elevated ddimer- always negative for blood clots- had multiple CT and US. States 4th time that year, further testing was done. Her brother was in medical school at that time and suggested further testing for lupus.  Repeat testing was positi get some discomfort with \"flares\" which resolves with gargling with apple cider vinegar and salt.   + diarrhea which she attributes to colchicine   + achilles tendon pain  + dry eyes, not severe and occasional per pt.  Attributes to working on [employer] colchicine 0.6 MG Oral Tab, Take 0.6 mg by mouth 2 (two) times daily. , Disp: , Rfl:   Hydroxychloroquine Sulfate 200 MG Oral Tab, Take 1 tablet by mouth 2 (two) times a day., Disp: , Rfl:   predniSONE 10 MG Oral Tab, Take 10 mg by mouth daily. , Disp: , R kg/m². Body surface area is 1.82 meters squared. Physical Exam  Vitals and nursing note reviewed. Constitutional:       General: She is not in acute distress. Appearance: Normal appearance. She is well-developed. She is not diaphoretic.    HEN 07.15.2020     CT CHEST(CONTRAST ONLY) (CPT=71260)   CLINICAL INDICATION: Localized enlarged lymph nodes. COMPARISON STUDY: CTA chest [facility] 9/16/2019.    TECHNIQUE: Helical images of the chest were obtained from the thoracic inlet through the ad consolidation/atelectasis involving the left lung lingula and lower lobe   3. Interval decrease in size of mediastinal hilar and axillary lymph nodes   4. A 2 mm nodule in the right lung upper lobe.  Per the Fleischner Society Guidelines (Radiology   2017), very recent CT exam of the chest from 5/24/2019, the largest lymph node as remeasured myself, stable in size between the current on the prior exam, 27 x 18 mm series    2, image 22 and 23 current exam, series 4, image 64 prior exam.  Additional bilateral e enlarged pelvic    sidewall lymph nodes and mildly enlarged inguinal lymph nodes.  The spleen does not appear enlarged.  No pleural effusion, ascites or pneumonia.  These lymph nodes are indeterminate, could reflect inflammatory etiology, with lymphoma als compromise. Pericardial      effusion size appears to be less compared to previous echocardiogram.     Impressions:  Compared to the previous study, these findings represent   improvement.      Additional Labs:  06/2021  UA grossly normal  Smith negative  E

## 2022-01-04 ENCOUNTER — HOSPITAL ENCOUNTER (OUTPATIENT)
Dept: GENERAL RADIOLOGY | Age: 27
Discharge: HOME | End: 2022-01-04
Attending: INTERNAL MEDICINE
Payer: COMMERCIAL

## 2022-01-04 DIAGNOSIS — M32.12 SYSTEMIC LUPUS ERYTHEMATOSUS (SLE) WITH PERICARDITIS, UNSPECIFIED SLE TYPE (HCC): ICD-10-CM

## 2022-01-04 DIAGNOSIS — M54.2 NECK PAIN: ICD-10-CM

## 2022-01-04 PROCEDURE — 72052 X-RAY EXAM NECK SPINE 6/>VWS: CPT | Performed by: INTERNAL MEDICINE

## 2022-01-05 ENCOUNTER — LAB ENCOUNTER (OUTPATIENT)
Dept: LAB | Age: 27
End: 2022-01-05
Attending: INTERNAL MEDICINE
Payer: COMMERCIAL

## 2022-01-05 DIAGNOSIS — I31.3 PERICARDIAL EFFUSION: ICD-10-CM

## 2022-01-05 DIAGNOSIS — E55.9 VITAMIN D DEFICIENCY: ICD-10-CM

## 2022-01-05 DIAGNOSIS — R76.8 DS DNA ANTIBODY POSITIVE: ICD-10-CM

## 2022-01-05 DIAGNOSIS — M32.12 SYSTEMIC LUPUS ERYTHEMATOSUS (SLE) WITH PERICARDITIS, UNSPECIFIED SLE TYPE (HCC): ICD-10-CM

## 2022-01-05 DIAGNOSIS — Z79.52 LONG TERM (CURRENT) USE OF SYSTEMIC STEROIDS: ICD-10-CM

## 2022-01-05 DIAGNOSIS — R76.8 ANA POSITIVE: ICD-10-CM

## 2022-01-05 LAB
ALBUMIN SERPL-MCNC: 3.7 G/DL (ref 3.4–5)
ALBUMIN/GLOB SERPL: 0.9 {RATIO} (ref 1–2)
ALP LIVER SERPL-CCNC: 62 U/L
ALT SERPL-CCNC: 12 U/L
ANION GAP SERPL CALC-SCNC: 4 MMOL/L (ref 0–18)
AST SERPL-CCNC: 12 U/L (ref 15–37)
BASOPHILS # BLD AUTO: 0.03 X10(3) UL (ref 0–0.2)
BASOPHILS NFR BLD AUTO: 0.4 %
BILIRUB SERPL-MCNC: 0.3 MG/DL (ref 0.1–2)
BILIRUB UR QL STRIP.AUTO: NEGATIVE
BUN BLD-MCNC: 10 MG/DL (ref 7–18)
C3 SERPL-MCNC: 105 MG/DL (ref 90–180)
C4 SERPL-MCNC: 20.1 MG/DL (ref 10–40)
CALCIUM BLD-MCNC: 9.4 MG/DL (ref 8.5–10.1)
CHLORIDE SERPL-SCNC: 107 MMOL/L (ref 98–112)
CO2 SERPL-SCNC: 27 MMOL/L (ref 21–32)
COLOR UR AUTO: YELLOW
CREAT BLD-MCNC: 0.9 MG/DL
CREAT UR-SCNC: 221 MG/DL
CRP SERPL-MCNC: 1.04 MG/DL (ref ?–0.3)
EOSINOPHIL # BLD AUTO: 0.05 X10(3) UL (ref 0–0.7)
EOSINOPHIL NFR BLD AUTO: 0.7 %
ERYTHROCYTE [DISTWIDTH] IN BLOOD BY AUTOMATED COUNT: 15.9 %
ERYTHROCYTE [SEDIMENTATION RATE] IN BLOOD: 25 MM/HR
FASTING STATUS PATIENT QL REPORTED: NO
GLOBULIN PLAS-MCNC: 4.1 G/DL (ref 2.8–4.4)
GLUCOSE BLD-MCNC: 73 MG/DL (ref 70–99)
GLUCOSE UR STRIP.AUTO-MCNC: NEGATIVE MG/DL
HCT VFR BLD AUTO: 40 %
HGB BLD-MCNC: 11.9 G/DL
IMM GRANULOCYTES # BLD AUTO: 0.02 X10(3) UL (ref 0–1)
IMM GRANULOCYTES NFR BLD: 0.3 %
KETONES UR STRIP.AUTO-MCNC: NEGATIVE MG/DL
LYMPHOCYTES # BLD AUTO: 1.18 X10(3) UL (ref 1–4)
LYMPHOCYTES NFR BLD AUTO: 16 %
MCH RBC QN AUTO: 25.2 PG (ref 26–34)
MCHC RBC AUTO-ENTMCNC: 29.8 G/DL (ref 31–37)
MCV RBC AUTO: 84.7 FL
MONOCYTES # BLD AUTO: 0.65 X10(3) UL (ref 0.1–1)
MONOCYTES NFR BLD AUTO: 8.8 %
NEUTROPHILS # BLD AUTO: 5.45 X10 (3) UL (ref 1.5–7.7)
NEUTROPHILS # BLD AUTO: 5.45 X10(3) UL (ref 1.5–7.7)
NEUTROPHILS NFR BLD AUTO: 73.8 %
NITRITE UR QL STRIP.AUTO: NEGATIVE
OSMOLALITY SERPL CALC.SUM OF ELEC: 284 MOSM/KG (ref 275–295)
PH UR STRIP.AUTO: 5 [PH] (ref 5–8)
PLATELET # BLD AUTO: 367 10(3)UL (ref 150–450)
POTASSIUM SERPL-SCNC: 3.9 MMOL/L (ref 3.5–5.1)
PROT SERPL-MCNC: 7.8 G/DL (ref 6.4–8.2)
PROT UR STRIP.AUTO-MCNC: NEGATIVE MG/DL
PROT UR-MCNC: 36.3 MG/DL
PROT/CREAT UR-RTO: 0.16
RBC # BLD AUTO: 4.72 X10(6)UL
RBC UR QL AUTO: NEGATIVE
SODIUM SERPL-SCNC: 138 MMOL/L (ref 136–145)
SP GR UR STRIP.AUTO: 1.02 (ref 1–1.03)
UROBILINOGEN UR STRIP.AUTO-MCNC: <2 MG/DL
VIT D+METAB SERPL-MCNC: 16.1 NG/ML (ref 30–100)
WBC # BLD AUTO: 7.4 X10(3) UL (ref 4–11)

## 2022-01-05 PROCEDURE — 86147 CARDIOLIPIN ANTIBODY EA IG: CPT

## 2022-01-05 PROCEDURE — 85610 PROTHROMBIN TIME: CPT

## 2022-01-05 PROCEDURE — 85705 THROMBOPLASTIN INHIBITION: CPT

## 2022-01-05 PROCEDURE — 86140 C-REACTIVE PROTEIN: CPT

## 2022-01-05 PROCEDURE — 82570 ASSAY OF URINE CREATININE: CPT

## 2022-01-05 PROCEDURE — 86256 FLUORESCENT ANTIBODY TITER: CPT

## 2022-01-05 PROCEDURE — 86160 COMPLEMENT ANTIGEN: CPT

## 2022-01-05 PROCEDURE — 86146 BETA-2 GLYCOPROTEIN ANTIBODY: CPT

## 2022-01-05 PROCEDURE — 82306 VITAMIN D 25 HYDROXY: CPT

## 2022-01-05 PROCEDURE — 85732 THROMBOPLASTIN TIME PARTIAL: CPT

## 2022-01-05 PROCEDURE — 85652 RBC SED RATE AUTOMATED: CPT

## 2022-01-05 PROCEDURE — 36415 COLL VENOUS BLD VENIPUNCTURE: CPT

## 2022-01-05 PROCEDURE — 85613 RUSSELL VIPER VENOM DILUTED: CPT

## 2022-01-05 PROCEDURE — 80053 COMPREHEN METABOLIC PANEL: CPT

## 2022-01-05 PROCEDURE — 85025 COMPLETE CBC W/AUTO DIFF WBC: CPT

## 2022-01-05 PROCEDURE — 81001 URINALYSIS AUTO W/SCOPE: CPT

## 2022-01-05 PROCEDURE — 84156 ASSAY OF PROTEIN URINE: CPT

## 2022-01-07 LAB
APTT PPP: 29.4 SECONDS (ref 23.3–35.6)
B2 GLYCOPROT1 IGG SERPL IA-ACNC: 2.6 U/ML (ref ?–7)
B2 GLYCOPROT1 IGM SERPL IA-ACNC: 9.7 U/ML (ref ?–7)
CARDIOLIPIN IGG SERPL-ACNC: 5.6 GPL (ref ?–10)
CARDIOLIPIN IGM SERPL-ACNC: 6.1 MPL (ref ?–10)
LA 3 SCREEN W REFLEX-IMP: NEGATIVE
PROTHROMBIN TIME: 12.4 SECONDS (ref 11.6–14.8)
SCREEN DRVVT: 1.05 S (ref 0–1.29)
SCREEN DRVVT: NEGATIVE S

## 2022-01-11 ENCOUNTER — OFFICE VISIT (OUTPATIENT)
Dept: RHEUMATOLOGY | Facility: CLINIC | Age: 27
End: 2022-01-11
Payer: COMMERCIAL

## 2022-01-11 VITALS
HEIGHT: 63.75 IN | RESPIRATION RATE: 16 BRPM | OXYGEN SATURATION: 99 % | WEIGHT: 173 LBS | HEART RATE: 84 BPM | SYSTOLIC BLOOD PRESSURE: 100 MMHG | TEMPERATURE: 98 F | DIASTOLIC BLOOD PRESSURE: 70 MMHG | BODY MASS INDEX: 29.9 KG/M2

## 2022-01-11 DIAGNOSIS — I31.3 PERICARDIAL EFFUSION: ICD-10-CM

## 2022-01-11 DIAGNOSIS — R79.82 CRP ELEVATED: ICD-10-CM

## 2022-01-11 DIAGNOSIS — D50.9 MICROCYTIC ANEMIA: ICD-10-CM

## 2022-01-11 DIAGNOSIS — M54.2 NECK PAIN: ICD-10-CM

## 2022-01-11 DIAGNOSIS — M32.12 SYSTEMIC LUPUS ERYTHEMATOSUS (SLE) WITH PERICARDITIS, UNSPECIFIED SLE TYPE (HCC): Primary | ICD-10-CM

## 2022-01-11 DIAGNOSIS — Z79.899 HIGH RISK MEDICATION USE: ICD-10-CM

## 2022-01-11 DIAGNOSIS — E55.9 VITAMIN D DEFICIENCY: ICD-10-CM

## 2022-01-11 DIAGNOSIS — R76.8 DS DNA ANTIBODY POSITIVE: ICD-10-CM

## 2022-01-11 DIAGNOSIS — Z79.52 LONG TERM (CURRENT) USE OF SYSTEMIC STEROIDS: ICD-10-CM

## 2022-01-11 DIAGNOSIS — R76.8 ANA POSITIVE: ICD-10-CM

## 2022-01-11 PROCEDURE — 3008F BODY MASS INDEX DOCD: CPT | Performed by: INTERNAL MEDICINE

## 2022-01-11 PROCEDURE — 99215 OFFICE O/P EST HI 40 MIN: CPT | Performed by: INTERNAL MEDICINE

## 2022-01-11 PROCEDURE — 3078F DIAST BP <80 MM HG: CPT | Performed by: INTERNAL MEDICINE

## 2022-01-11 PROCEDURE — 99417 PROLNG OP E/M EACH 15 MIN: CPT | Performed by: INTERNAL MEDICINE

## 2022-01-11 PROCEDURE — 3074F SYST BP LT 130 MM HG: CPT | Performed by: INTERNAL MEDICINE

## 2022-01-11 RX ORDER — ERGOCALCIFEROL 1.25 MG/1
50000 CAPSULE ORAL WEEKLY
Qty: 12 CAPSULE | Refills: 0 | Status: SHIPPED | OUTPATIENT
Start: 2022-01-11 | End: 2022-04-11

## 2022-01-11 RX ORDER — MYCOPHENOLATE MOFETIL 500 MG/1
TABLET ORAL
COMMUNITY
Start: 2022-01-04

## 2022-01-11 RX ORDER — CYCLOBENZAPRINE HCL 5 MG
5 TABLET ORAL NIGHTLY
Qty: 7 TABLET | Refills: 0 | Status: SHIPPED | OUTPATIENT
Start: 2022-01-11 | End: 2022-01-18

## 2022-01-11 NOTE — PROGRESS NOTES
RHEUMATOLOGY FOLLOW UP   Date of visit: 1/11/2022  ? Patient presents with:  SLE: 2 week f/u. No new symptoms. Finally got back on cellcept for the past week. after dealing with insurance. Having a bit of a flare since last visit.      ASSESSMENT, DISCUS call when able to schedule)   -- iron studies will be checked with next set of labs  -- follow up in 3 months or sooner as needed  -- we will be in communication monthly with lab results  -- call with questions/concers in the meantime      At this time, I (CRITHIDIA LUCILIAE) ANTIBODY IGG BY IFA; Standing  -     CT ANGIOGRAPHY, CHEST (CPT=19813);  Future    GURDEEP positive  -     DSDNA (CRITHIDIA LUCILIAE) ANTIBODY IGG BY IFA; Standing    Ds DNA antibody positive  -     DSDNA (CRITHIDIA LUCILIAE) ANTIBODY IGG B pain overall. Restarted cellcept after injecting Benlysta. Denies site reaction.      Currently taking:  Plaquenil 200mg BID- last eye exam in November (Dr. Ramona Bullock)   Cellcept 500mg BID  Benlysta injection once monthly   Prednisone 10mg daily     HPI from since 2020 due to persistent chest pain without the shortness of breath. Felt like she could not work out without some sort of compression. ECHOs and CT scans have shown improvement. Colchicine has helped.    Recently had Benlysta added to regimen last mo mouth or recurrent cavities. No fevers, chills, night sweats, unexpected weight loss, easy bruising or bleeding. Denies chronic sinus infections/disease or epistaxis. Denies chronic cough or hemoptysis.      Family hx:   No known family hx of lupus or ot redness. Respiratory: Positive for shortness of breath (occasionally). Negative for cough and hemoptysis. Cardiovascular: Positive for chest pain. Negative for leg swelling. Gastrointestinal: Positive for abdominal pain, constipation and diarrhea.  Gabby Dailey Effort: Pulmonary effort is normal. No respiratory distress. Breath sounds: Normal breath sounds. No wheezing. Chest:      Chest wall: Tenderness present. Musculoskeletal:         General: Deformity present. No swelling.       Cervical back: Neck s collimated axial images. CONTRAST: 80 mL Isovue 370   Automated exposure control and ALARA manual techniques for patient specific dose reduction were   followed while maintaining the necessary diagnostic image quality. ADVERSE REACTION: None.    FINDING suggested. For   patients at higher risk, such as smokers, a follow-up chest CT in 12 months is suggested. In the   setting of known malignancy or an immunocompromise patient, follow-up chest CT in 3-6 months is   suggested.      PROCEDURE:  CT CHEST+ABDOME  Unremarkable.       BILIARY:  Unremarkable.       PANCREAS:  Unremarkable.       SPLEEN:  No splenomegaly.       KIDNEYS:  No acute abnormality.       ADRENALS:  Unremarkable.       AORTA/VASCULAR:  No aortic aneurysm.       RETROPERITONEUM:  Numerous ret Uterine fibroid.  Bilateral adnexal cystic lesions are most likely ovarian cyst.  Consider obtaining pelvic ultrasound to better assess these pelvic abnormalities.           Dictated by: Davina White MD on 6/08/2019 at 14:27       Approved by: Zina Hewitt elevated  ESR 25 borderline  dsDNA 1: 2560  C4 20.1 normal  C3 105 normal  Vitamin D 16.1 low  LAC negative  ACL negative  B2G IgM 9.7 equivocal; IgG negative  Urine protein creatinine ratio 0.16  UA negative for protein or blood (small leuk esterase)    0

## 2022-01-12 PROBLEM — R76.8 DS DNA ANTIBODY POSITIVE: Status: ACTIVE | Noted: 2022-01-12

## 2022-01-12 PROBLEM — Z79.52 LONG TERM (CURRENT) USE OF SYSTEMIC STEROIDS: Status: ACTIVE | Noted: 2022-01-12

## 2022-01-12 PROBLEM — R76.8 ANA POSITIVE: Status: ACTIVE | Noted: 2022-01-12

## 2022-01-12 PROBLEM — D50.9 MICROCYTIC ANEMIA: Status: ACTIVE | Noted: 2019-09-16

## 2022-01-12 PROBLEM — Z79.899 HIGH RISK MEDICATION USE: Status: ACTIVE | Noted: 2022-01-12

## 2022-01-12 PROBLEM — E55.9 VITAMIN D DEFICIENCY: Status: ACTIVE | Noted: 2022-01-12

## 2022-01-12 NOTE — PATIENT INSTRUCTIONS
-- will continue to get labs monthly to monitor lupus activity as well as toxicities from medications  -- continue mycophenolate 500mg twice daily, but will likely increase after a month  -- continue Benlysta injections  -- continue prednisone 10mg daily f

## 2022-01-31 ENCOUNTER — TELEPHONE (OUTPATIENT)
Dept: RHEUMATOLOGY | Facility: CLINIC | Age: 27
End: 2022-01-31

## 2022-01-31 NOTE — TELEPHONE ENCOUNTER
PA for CT angiography started with [facility]. Pending clinical review. Office notes required and faxed to [phone].  Call ref# [id number] LENA/GLEN/Osmar

## 2022-02-04 ENCOUNTER — HOSPITAL ENCOUNTER (OUTPATIENT)
Dept: CT IMAGING | Facility: HOSPITAL | Age: 27
Discharge: HOME | End: 2022-02-04
Attending: INTERNAL MEDICINE
Payer: COMMERCIAL

## 2022-02-04 DIAGNOSIS — E55.9 VITAMIN D DEFICIENCY: ICD-10-CM

## 2022-02-04 DIAGNOSIS — Z79.899 HIGH RISK MEDICATION USE: ICD-10-CM

## 2022-02-04 DIAGNOSIS — R79.82 CRP ELEVATED: ICD-10-CM

## 2022-02-04 DIAGNOSIS — M32.12 SYSTEMIC LUPUS ERYTHEMATOSUS (SLE) WITH PERICARDITIS, UNSPECIFIED SLE TYPE (HCC): ICD-10-CM

## 2022-02-04 DIAGNOSIS — R76.8 DS DNA ANTIBODY POSITIVE: ICD-10-CM

## 2022-02-04 PROCEDURE — 71275 CT ANGIOGRAPHY CHEST: CPT | Performed by: INTERNAL MEDICINE

## 2022-02-04 RX ORDER — IOHEXOL 350 MG/ML
100 INJECTION, SOLUTION INTRAVENOUS
Status: COMPLETED | OUTPATIENT
Start: 2022-02-04 | End: 2022-02-04

## 2022-02-04 RX ADMIN — IOHEXOL 100 ML: 350 INJECTION, SOLUTION INTRAVENOUS at 08:37:00

## 2022-02-19 ENCOUNTER — IMMUNIZATION (OUTPATIENT)
Dept: LAB | Age: 27
End: 2022-02-19
Attending: EMERGENCY MEDICINE
Payer: COMMERCIAL

## 2022-02-19 DIAGNOSIS — Z23 NEED FOR VACCINATION: Primary | ICD-10-CM

## 2022-02-19 PROCEDURE — 0053A SARSCOV2 VAC 30MCG TRS SUCR: CPT

## 2022-03-01 ENCOUNTER — PATIENT MESSAGE (OUTPATIENT)
Dept: RHEUMATOLOGY | Facility: CLINIC | Age: 27
End: 2022-03-01

## 2022-03-01 RX ORDER — COLCHICINE 0.6 MG/1
0.6 TABLET ORAL 2 TIMES DAILY
Qty: 180 TABLET | Refills: 0 | Status: SHIPPED | OUTPATIENT
Start: 2022-03-01 | End: 2022-05-30

## 2022-03-01 RX ORDER — PREDNISONE 10 MG/1
10 TABLET ORAL DAILY
Qty: 90 TABLET | Refills: 0 | Status: SHIPPED | OUTPATIENT
Start: 2022-03-01

## 2022-03-01 NOTE — TELEPHONE ENCOUNTER
Future Appointments   Date Time Provider Neftali Escamilla   4/19/2022 12:30 PM Germaine Ross DO [facility]     LOV 1/12/22  RTO in 3mo

## 2022-03-01 NOTE — TELEPHONE ENCOUNTER
From: Stephen June  To: Almita Izquierdo DO  Sent: 3/1/2022 4:19 PM CST  Subject: Refill Needed    Hi Dr. Jessica Felix! Would you be able to refill my meds at this time. They were previously prescribed by Dr. Marguerite Carney. I need refills for 10mg of Prednisone and .6 mg of Colchicine at this time.

## 2022-03-26 ENCOUNTER — LAB ENCOUNTER (OUTPATIENT)
Dept: LAB | Age: 27
End: 2022-03-26
Attending: INTERNAL MEDICINE
Payer: COMMERCIAL

## 2022-03-26 DIAGNOSIS — D50.9 MICROCYTIC ANEMIA: ICD-10-CM

## 2022-03-26 DIAGNOSIS — E55.9 VITAMIN D DEFICIENCY: ICD-10-CM

## 2022-03-26 DIAGNOSIS — Z79.899 HIGH RISK MEDICATION USE: ICD-10-CM

## 2022-03-26 DIAGNOSIS — R76.8 ANA POSITIVE: ICD-10-CM

## 2022-03-26 DIAGNOSIS — R76.8 DS DNA ANTIBODY POSITIVE: ICD-10-CM

## 2022-03-26 DIAGNOSIS — R79.82 CRP ELEVATED: ICD-10-CM

## 2022-03-26 DIAGNOSIS — M32.12 SYSTEMIC LUPUS ERYTHEMATOSUS (SLE) WITH PERICARDITIS, UNSPECIFIED SLE TYPE (HCC): ICD-10-CM

## 2022-03-26 LAB
ALBUMIN SERPL-MCNC: 3.8 G/DL (ref 3.4–5)
ALBUMIN/GLOB SERPL: 1 {RATIO} (ref 1–2)
ALP LIVER SERPL-CCNC: 54 U/L
ALT SERPL-CCNC: 14 U/L
ANION GAP SERPL CALC-SCNC: 8 MMOL/L (ref 0–18)
AST SERPL-CCNC: 13 U/L (ref 15–37)
BASOPHILS # BLD AUTO: 0.02 X10(3) UL (ref 0–0.2)
BASOPHILS NFR BLD AUTO: 0.4 %
BILIRUB SERPL-MCNC: 0.3 MG/DL (ref 0.1–2)
BUN BLD-MCNC: 10 MG/DL (ref 7–18)
CALCIUM BLD-MCNC: 9.2 MG/DL (ref 8.5–10.1)
CHLORIDE SERPL-SCNC: 109 MMOL/L (ref 98–112)
CO2 SERPL-SCNC: 24 MMOL/L (ref 21–32)
CREAT BLD-MCNC: 0.84 MG/DL
CRP SERPL-MCNC: <0.29 MG/DL (ref ?–0.3)
DEPRECATED HBV CORE AB SER IA-ACNC: 14 NG/ML
EOSINOPHIL # BLD AUTO: 0.07 X10(3) UL (ref 0–0.7)
EOSINOPHIL NFR BLD AUTO: 1.4 %
ERYTHROCYTE [DISTWIDTH] IN BLOOD BY AUTOMATED COUNT: 15.8 %
ERYTHROCYTE [SEDIMENTATION RATE] IN BLOOD: 20 MM/HR
FASTING STATUS PATIENT QL REPORTED: YES
GLOBULIN PLAS-MCNC: 3.7 G/DL (ref 2.8–4.4)
GLUCOSE BLD-MCNC: 71 MG/DL (ref 70–99)
HCT VFR BLD AUTO: 36.7 %
HGB BLD-MCNC: 11.5 G/DL
IMM GRANULOCYTES # BLD AUTO: 0.01 X10(3) UL (ref 0–1)
IRON SATN MFR SERPL: 11 %
IRON SERPL-MCNC: 45 UG/DL
LYMPHOCYTES # BLD AUTO: 2.18 X10(3) UL (ref 1–4)
LYMPHOCYTES NFR BLD AUTO: 43.1 %
MCH RBC QN AUTO: 26.2 PG (ref 26–34)
MCHC RBC AUTO-ENTMCNC: 31.3 G/DL (ref 31–37)
MCV RBC AUTO: 83.6 FL
MONOCYTES # BLD AUTO: 0.58 X10(3) UL (ref 0.1–1)
MONOCYTES NFR BLD AUTO: 11.5 %
NEUTROPHILS # BLD AUTO: 2.2 X10 (3) UL (ref 1.5–7.7)
NEUTROPHILS # BLD AUTO: 2.2 X10(3) UL (ref 1.5–7.7)
NEUTROPHILS NFR BLD AUTO: 43.4 %
OSMOLALITY SERPL CALC.SUM OF ELEC: 290 MOSM/KG (ref 275–295)
PLATELET # BLD AUTO: 369 10(3)UL (ref 150–450)
POTASSIUM SERPL-SCNC: 3.9 MMOL/L (ref 3.5–5.1)
PROT SERPL-MCNC: 7.5 G/DL (ref 6.4–8.2)
RBC # BLD AUTO: 4.39 X10(6)UL
SODIUM SERPL-SCNC: 141 MMOL/L (ref 136–145)
TIBC SERPL-MCNC: 417 UG/DL (ref 240–450)
TRANSFERRIN SERPL-MCNC: 280 MG/DL (ref 200–360)
VIT D+METAB SERPL-MCNC: 37.5 NG/ML (ref 30–100)
WBC # BLD AUTO: 5.1 X10(3) UL (ref 4–11)

## 2022-03-26 PROCEDURE — 83540 ASSAY OF IRON: CPT | Performed by: INTERNAL MEDICINE

## 2022-03-26 PROCEDURE — 86256 FLUORESCENT ANTIBODY TITER: CPT | Performed by: INTERNAL MEDICINE

## 2022-03-26 PROCEDURE — 82306 VITAMIN D 25 HYDROXY: CPT | Performed by: INTERNAL MEDICINE

## 2022-03-26 PROCEDURE — 86140 C-REACTIVE PROTEIN: CPT | Performed by: INTERNAL MEDICINE

## 2022-03-26 PROCEDURE — 83550 IRON BINDING TEST: CPT | Performed by: INTERNAL MEDICINE

## 2022-03-26 PROCEDURE — 80053 COMPREHEN METABOLIC PANEL: CPT | Performed by: INTERNAL MEDICINE

## 2022-03-26 PROCEDURE — 82728 ASSAY OF FERRITIN: CPT | Performed by: INTERNAL MEDICINE

## 2022-03-26 PROCEDURE — 85652 RBC SED RATE AUTOMATED: CPT | Performed by: INTERNAL MEDICINE

## 2022-03-26 PROCEDURE — 85025 COMPLETE CBC W/AUTO DIFF WBC: CPT | Performed by: INTERNAL MEDICINE

## 2022-03-28 ENCOUNTER — PATIENT MESSAGE (OUTPATIENT)
Dept: RHEUMATOLOGY | Facility: CLINIC | Age: 27
End: 2022-03-28

## 2022-03-28 ENCOUNTER — HOSPITAL ENCOUNTER (OUTPATIENT)
Dept: GENERAL RADIOLOGY | Age: 27
Discharge: HOME | End: 2022-03-28
Attending: INTERNAL MEDICINE
Payer: COMMERCIAL

## 2022-03-28 DIAGNOSIS — M54.2 NECK PAIN: ICD-10-CM

## 2022-03-28 DIAGNOSIS — M32.12 SYSTEMIC LUPUS ERYTHEMATOSUS (SLE) WITH PERICARDITIS, UNSPECIFIED SLE TYPE (HCC): ICD-10-CM

## 2022-03-28 PROCEDURE — 72072 X-RAY EXAM THORAC SPINE 3VWS: CPT | Performed by: INTERNAL MEDICINE

## 2022-03-28 RX ORDER — HYDROXYCHLOROQUINE SULFATE 200 MG/1
200 TABLET, FILM COATED ORAL 2 TIMES DAILY
Qty: 180 TABLET | Refills: 0 | Status: SHIPPED | OUTPATIENT
Start: 2022-03-28

## 2022-04-19 ENCOUNTER — OFFICE VISIT (OUTPATIENT)
Dept: RHEUMATOLOGY | Facility: CLINIC | Age: 27
End: 2022-04-19
Payer: COMMERCIAL

## 2022-04-19 VITALS
WEIGHT: 183 LBS | RESPIRATION RATE: 16 BRPM | HEART RATE: 84 BPM | DIASTOLIC BLOOD PRESSURE: 64 MMHG | SYSTOLIC BLOOD PRESSURE: 108 MMHG | TEMPERATURE: 98 F | BODY MASS INDEX: 32.43 KG/M2 | HEIGHT: 63 IN

## 2022-04-19 DIAGNOSIS — R76.8 DS DNA ANTIBODY POSITIVE: ICD-10-CM

## 2022-04-19 DIAGNOSIS — Z79.899 HIGH RISK MEDICATION USE: ICD-10-CM

## 2022-04-19 DIAGNOSIS — Z79.52 LONG TERM (CURRENT) USE OF SYSTEMIC STEROIDS: ICD-10-CM

## 2022-04-19 DIAGNOSIS — D50.8 OTHER IRON DEFICIENCY ANEMIA: ICD-10-CM

## 2022-04-19 DIAGNOSIS — R76.8 ANA POSITIVE: ICD-10-CM

## 2022-04-19 DIAGNOSIS — R76.0 ANTIPHOSPHOLIPID ANTIBODY POSITIVE: ICD-10-CM

## 2022-04-19 DIAGNOSIS — M32.12 SYSTEMIC LUPUS ERYTHEMATOSUS (SLE) WITH PERICARDITIS, UNSPECIFIED SLE TYPE (HCC): Primary | ICD-10-CM

## 2022-04-19 PROCEDURE — 3008F BODY MASS INDEX DOCD: CPT | Performed by: INTERNAL MEDICINE

## 2022-04-19 PROCEDURE — 3074F SYST BP LT 130 MM HG: CPT | Performed by: INTERNAL MEDICINE

## 2022-04-19 PROCEDURE — 3078F DIAST BP <80 MM HG: CPT | Performed by: INTERNAL MEDICINE

## 2022-04-19 PROCEDURE — 99214 OFFICE O/P EST MOD 30 MIN: CPT | Performed by: INTERNAL MEDICINE

## 2022-04-19 RX ORDER — PREDNISONE 1 MG/1
7.5 TABLET ORAL DAILY
Qty: 135 TABLET | Refills: 1 | Status: SHIPPED | OUTPATIENT
Start: 2022-04-19 | End: 2022-07-18

## 2022-04-19 RX ORDER — MYCOPHENOLATE MOFETIL 500 MG/1
1000 TABLET ORAL 2 TIMES DAILY
Qty: 360 TABLET | Refills: 0 | Status: SHIPPED | OUTPATIENT
Start: 2022-04-19 | End: 2022-07-18

## 2022-04-19 NOTE — PATIENT INSTRUCTIONS
-- continue mycophenolate 1000mg twice daily, and will decide if further increase needed.    -- continue Benlysta injections  -- decrease prednisone 7.5mg daily (okay to split to 5mg in am and 2.5mg in pm)  -- continue colchicine for now, stay at the 0.6mg daily and will hopefully taper off in next few weeks/months   -- repeat labs one month after decreasing prednisone   -- follow up in 3 months or sooner as needed  -- we will be in communication in the meantime with lab results  -- call with questions/concers in the meantime    Dr. Arnoldo Hawthorne

## 2022-07-01 DIAGNOSIS — R76.8 DS DNA ANTIBODY POSITIVE: ICD-10-CM

## 2022-07-01 DIAGNOSIS — R76.8 ANA POSITIVE: ICD-10-CM

## 2022-07-01 DIAGNOSIS — M32.12 SYSTEMIC LUPUS ERYTHEMATOSUS (SLE) WITH PERICARDITIS, UNSPECIFIED SLE TYPE (HCC): ICD-10-CM

## 2022-07-01 RX ORDER — MYCOPHENOLATE MOFETIL 500 MG/1
1000 TABLET ORAL 2 TIMES DAILY
Qty: 360 TABLET | Refills: 0 | Status: SHIPPED | OUTPATIENT
Start: 2022-07-01 | End: 2022-09-29

## 2022-07-01 RX ORDER — MYCOPHENOLATE MOFETIL 500 MG/1
TABLET ORAL
Qty: 120 TABLET | Refills: 0 | Status: SHIPPED | OUTPATIENT
Start: 2022-07-01

## 2022-07-01 NOTE — TELEPHONE ENCOUNTER
[facility] phoned office for refill of mycophenolate.     Future Appointments   Date Time Provider Neftali Escamilla   8/3/2022 11:45 AM Yuly Parks, DO [facility]   10/25/2022  8:30 AM Germaine Ross, DO [facility]     LOV 4/19/22  RTO in 3mo  Labs last completed 3/26/22  Requested labs in one month

## 2022-07-29 ENCOUNTER — LAB ENCOUNTER (OUTPATIENT)
Dept: LAB | Age: 27
End: 2022-07-29
Attending: INTERNAL MEDICINE
Payer: COMMERCIAL

## 2022-07-29 DIAGNOSIS — D50.8 OTHER IRON DEFICIENCY ANEMIA: ICD-10-CM

## 2022-07-29 DIAGNOSIS — Z79.899 HIGH RISK MEDICATION USE: ICD-10-CM

## 2022-07-29 DIAGNOSIS — R76.8 DS DNA ANTIBODY POSITIVE: ICD-10-CM

## 2022-07-29 DIAGNOSIS — Z79.52 LONG TERM (CURRENT) USE OF SYSTEMIC STEROIDS: ICD-10-CM

## 2022-07-29 DIAGNOSIS — R76.8 ANA POSITIVE: ICD-10-CM

## 2022-07-29 DIAGNOSIS — M32.12 SYSTEMIC LUPUS ERYTHEMATOSUS (SLE) WITH PERICARDITIS, UNSPECIFIED SLE TYPE (HCC): ICD-10-CM

## 2022-07-29 DIAGNOSIS — R76.0 ANTIPHOSPHOLIPID ANTIBODY POSITIVE: ICD-10-CM

## 2022-07-29 LAB
ALBUMIN SERPL-MCNC: 3.7 G/DL (ref 3.4–5)
ALBUMIN/GLOB SERPL: 0.9 {RATIO} (ref 1–2)
ALP LIVER SERPL-CCNC: 63 U/L
ALT SERPL-CCNC: 18 U/L
ANION GAP SERPL CALC-SCNC: 7 MMOL/L (ref 0–18)
AST SERPL-CCNC: 17 U/L (ref 15–37)
BASOPHILS # BLD AUTO: 0.03 X10(3) UL (ref 0–0.2)
BASOPHILS NFR BLD AUTO: 0.8 %
BILIRUB SERPL-MCNC: 0.5 MG/DL (ref 0.1–2)
BUN BLD-MCNC: 12 MG/DL (ref 7–18)
C3 SERPL-MCNC: 111 MG/DL (ref 90–180)
C4 SERPL-MCNC: 24.8 MG/DL (ref 10–40)
CALCIUM BLD-MCNC: 9 MG/DL (ref 8.5–10.1)
CHLORIDE SERPL-SCNC: 109 MMOL/L (ref 98–112)
CO2 SERPL-SCNC: 24 MMOL/L (ref 21–32)
CREAT BLD-MCNC: 0.92 MG/DL
CRP SERPL-MCNC: 0.68 MG/DL (ref ?–0.3)
DEPRECATED HBV CORE AB SER IA-ACNC: 13.2 NG/ML
EOSINOPHIL # BLD AUTO: 0.1 X10(3) UL (ref 0–0.7)
EOSINOPHIL NFR BLD AUTO: 2.8 %
ERYTHROCYTE [DISTWIDTH] IN BLOOD BY AUTOMATED COUNT: 15.6 %
ERYTHROCYTE [SEDIMENTATION RATE] IN BLOOD: 18 MM/HR
FASTING STATUS PATIENT QL REPORTED: YES
GLOBULIN PLAS-MCNC: 4.1 G/DL (ref 2.8–4.4)
GLUCOSE BLD-MCNC: 70 MG/DL (ref 70–99)
HCT VFR BLD AUTO: 37.3 %
HGB BLD-MCNC: 11.2 G/DL
IMM GRANULOCYTES # BLD AUTO: 0 X10(3) UL (ref 0–1)
IMM GRANULOCYTES NFR BLD: 0 %
IRON SATN MFR SERPL: 9 %
IRON SERPL-MCNC: 39 UG/DL
LYMPHOCYTES # BLD AUTO: 1.7 X10(3) UL (ref 1–4)
LYMPHOCYTES NFR BLD AUTO: 47.8 %
MCH RBC QN AUTO: 25.6 PG (ref 26–34)
MCHC RBC AUTO-ENTMCNC: 30 G/DL (ref 31–37)
MCV RBC AUTO: 85.4 FL
MONOCYTES # BLD AUTO: 0.46 X10(3) UL (ref 0.1–1)
MONOCYTES NFR BLD AUTO: 12.9 %
NEUTROPHILS # BLD AUTO: 1.27 X10 (3) UL (ref 1.5–7.7)
NEUTROPHILS # BLD AUTO: 1.27 X10(3) UL (ref 1.5–7.7)
NEUTROPHILS NFR BLD AUTO: 35.7 %
OSMOLALITY SERPL CALC.SUM OF ELEC: 288 MOSM/KG (ref 275–295)
PLATELET # BLD AUTO: 372 10(3)UL (ref 150–450)
POTASSIUM SERPL-SCNC: 3.6 MMOL/L (ref 3.5–5.1)
PROT SERPL-MCNC: 7.8 G/DL (ref 6.4–8.2)
RBC # BLD AUTO: 4.37 X10(6)UL
SODIUM SERPL-SCNC: 140 MMOL/L (ref 136–145)
TIBC SERPL-MCNC: 423 UG/DL (ref 240–450)
TRANSFERRIN SERPL-MCNC: 284 MG/DL (ref 200–360)
WBC # BLD AUTO: 3.6 X10(3) UL (ref 4–11)

## 2022-07-29 PROCEDURE — 85610 PROTHROMBIN TIME: CPT | Performed by: INTERNAL MEDICINE

## 2022-07-29 PROCEDURE — 86160 COMPLEMENT ANTIGEN: CPT | Performed by: INTERNAL MEDICINE

## 2022-07-29 PROCEDURE — 80053 COMPREHEN METABOLIC PANEL: CPT | Performed by: INTERNAL MEDICINE

## 2022-07-29 PROCEDURE — 86140 C-REACTIVE PROTEIN: CPT | Performed by: INTERNAL MEDICINE

## 2022-07-29 PROCEDURE — 82728 ASSAY OF FERRITIN: CPT | Performed by: INTERNAL MEDICINE

## 2022-07-29 PROCEDURE — 83540 ASSAY OF IRON: CPT | Performed by: INTERNAL MEDICINE

## 2022-07-29 PROCEDURE — 85652 RBC SED RATE AUTOMATED: CPT | Performed by: INTERNAL MEDICINE

## 2022-07-29 PROCEDURE — 86147 CARDIOLIPIN ANTIBODY EA IG: CPT | Performed by: INTERNAL MEDICINE

## 2022-07-29 PROCEDURE — 85613 RUSSELL VIPER VENOM DILUTED: CPT | Performed by: INTERNAL MEDICINE

## 2022-07-29 PROCEDURE — 86146 BETA-2 GLYCOPROTEIN ANTIBODY: CPT | Performed by: INTERNAL MEDICINE

## 2022-07-29 PROCEDURE — 83550 IRON BINDING TEST: CPT | Performed by: INTERNAL MEDICINE

## 2022-07-29 PROCEDURE — 85025 COMPLETE CBC W/AUTO DIFF WBC: CPT | Performed by: INTERNAL MEDICINE

## 2022-08-03 ENCOUNTER — OFFICE VISIT (OUTPATIENT)
Dept: RHEUMATOLOGY | Facility: CLINIC | Age: 27
End: 2022-08-03
Payer: COMMERCIAL

## 2022-08-03 VITALS
WEIGHT: 181 LBS | DIASTOLIC BLOOD PRESSURE: 80 MMHG | BODY MASS INDEX: 32.07 KG/M2 | RESPIRATION RATE: 16 BRPM | HEIGHT: 63 IN | SYSTOLIC BLOOD PRESSURE: 110 MMHG | HEART RATE: 88 BPM

## 2022-08-03 DIAGNOSIS — M32.12 SYSTEMIC LUPUS ERYTHEMATOSUS (SLE) WITH PERICARDITIS, UNSPECIFIED SLE TYPE (HCC): Primary | ICD-10-CM

## 2022-08-03 DIAGNOSIS — E55.9 VITAMIN D DEFICIENCY: ICD-10-CM

## 2022-08-03 DIAGNOSIS — D50.8 OTHER IRON DEFICIENCY ANEMIA: ICD-10-CM

## 2022-08-03 DIAGNOSIS — Z79.899 HIGH RISK MEDICATION USE: ICD-10-CM

## 2022-08-03 DIAGNOSIS — Z79.52 LONG TERM (CURRENT) USE OF SYSTEMIC STEROIDS: ICD-10-CM

## 2022-08-03 DIAGNOSIS — R76.8 DS DNA ANTIBODY POSITIVE: ICD-10-CM

## 2022-08-03 DIAGNOSIS — R76.8 ANA POSITIVE: ICD-10-CM

## 2022-08-03 PROCEDURE — 3079F DIAST BP 80-89 MM HG: CPT | Performed by: INTERNAL MEDICINE

## 2022-08-03 PROCEDURE — 3074F SYST BP LT 130 MM HG: CPT | Performed by: INTERNAL MEDICINE

## 2022-08-03 PROCEDURE — 99214 OFFICE O/P EST MOD 30 MIN: CPT | Performed by: INTERNAL MEDICINE

## 2022-08-03 PROCEDURE — 3008F BODY MASS INDEX DOCD: CPT | Performed by: INTERNAL MEDICINE

## 2022-08-03 RX ORDER — PREDNISONE 1 MG/1
TABLET ORAL
COMMUNITY
Start: 2022-07-27

## 2022-08-03 RX ORDER — HYDROXYCHLOROQUINE SULFATE 200 MG/1
200 TABLET, FILM COATED ORAL 2 TIMES DAILY
Qty: 180 TABLET | Refills: 0 | Status: SHIPPED | OUTPATIENT
Start: 2022-08-03

## 2022-08-04 NOTE — PATIENT INSTRUCTIONS
-- continue mycophenolate 1000mg twice daily  -- continue Benlysta injections  -- continue plaquenil 200mg twice daily   -- take colchicine once every other day for 1-2 weeks, then stop and monitor symptoms  -- if doing well, try to decrease prednisone 5mg daily (okay to split 2.5mg twice daily)  -- repeat labs prior to next visit   -- follow up in 3 months or sooner as needed  -- call with questions/concers in the meantime  -- consider re-evaluation by pulmonology as well as GI eval  -- discuss with pcp need for iron infusions    Dr. Tyler Tolbert

## 2022-08-08 LAB
BETA-2 GLYCOPROTEIN I AB, IGG: <10 SGU
BETA-2 GLYCOPROTEIN I AB, IGM: 12 SMU
CARDIOLIPIN ANTIBODY, IGG: <10 GPL
CARDIOLIPIN ANTIBODY, IGM: 15 MPL
DRVVT SCREEN: 36 SEC
PROTHROMBIN TIME: 12.9 SEC
PTT-LA SCREEN (PTT-D): 41 SEC

## 2022-10-17 DIAGNOSIS — R76.8 DS DNA ANTIBODY POSITIVE: ICD-10-CM

## 2022-10-17 DIAGNOSIS — M32.12 SYSTEMIC LUPUS ERYTHEMATOSUS (SLE) WITH PERICARDITIS, UNSPECIFIED SLE TYPE (HCC): ICD-10-CM

## 2022-10-17 DIAGNOSIS — R76.8 ANA POSITIVE: ICD-10-CM

## 2022-10-17 RX ORDER — MYCOPHENOLATE MOFETIL 500 MG/1
TABLET ORAL
Qty: 120 TABLET | Refills: 0 | Status: SHIPPED | OUTPATIENT
Start: 2022-10-17

## 2022-10-17 NOTE — TELEPHONE ENCOUNTER
Future Appointments   Date Time Provider Neftali Escamilla   10/25/2022  8:30 AM Lion Can, DO [facility]   1/24/2023  8:30 AM Emily Ross, DO [facility]     Last office visit 8/3/2022  Last labs taken 7/29/2022

## 2022-10-21 ENCOUNTER — LAB ENCOUNTER (OUTPATIENT)
Dept: LAB | Age: 27
End: 2022-10-21
Attending: INTERNAL MEDICINE
Payer: COMMERCIAL

## 2022-10-21 DIAGNOSIS — R76.8 DS DNA ANTIBODY POSITIVE: ICD-10-CM

## 2022-10-21 DIAGNOSIS — E55.9 VITAMIN D DEFICIENCY: ICD-10-CM

## 2022-10-21 DIAGNOSIS — M32.12 SYSTEMIC LUPUS ERYTHEMATOSUS (SLE) WITH PERICARDITIS, UNSPECIFIED SLE TYPE (HCC): ICD-10-CM

## 2022-10-21 DIAGNOSIS — D50.8 OTHER IRON DEFICIENCY ANEMIA: ICD-10-CM

## 2022-10-21 DIAGNOSIS — R76.8 ANA POSITIVE: ICD-10-CM

## 2022-10-21 LAB
ALBUMIN SERPL-MCNC: 3.6 G/DL (ref 3.4–5)
ALBUMIN/GLOB SERPL: 0.9 {RATIO} (ref 1–2)
ALP LIVER SERPL-CCNC: 58 U/L
ALT SERPL-CCNC: 12 U/L
ANION GAP SERPL CALC-SCNC: 4 MMOL/L (ref 0–18)
AST SERPL-CCNC: 11 U/L (ref 15–37)
BASOPHILS # BLD AUTO: 0.03 X10(3) UL (ref 0–0.2)
BASOPHILS NFR BLD AUTO: 0.7 %
BILIRUB SERPL-MCNC: 0.2 MG/DL (ref 0.1–2)
BILIRUB UR QL STRIP.AUTO: NEGATIVE
BUN BLD-MCNC: 8 MG/DL (ref 7–18)
C3 SERPL-MCNC: 106 MG/DL (ref 90–180)
C4 SERPL-MCNC: 24.8 MG/DL (ref 10–40)
CALCIUM BLD-MCNC: 9.1 MG/DL (ref 8.5–10.1)
CHLORIDE SERPL-SCNC: 109 MMOL/L (ref 98–112)
CO2 SERPL-SCNC: 26 MMOL/L (ref 21–32)
COLOR UR AUTO: YELLOW
CREAT BLD-MCNC: 0.93 MG/DL
CREAT UR-SCNC: 307 MG/DL
CRP SERPL-MCNC: 0.33 MG/DL (ref ?–0.3)
EOSINOPHIL # BLD AUTO: 0.07 X10(3) UL (ref 0–0.7)
EOSINOPHIL NFR BLD AUTO: 1.6 %
ERYTHROCYTE [DISTWIDTH] IN BLOOD BY AUTOMATED COUNT: 15.5 %
ERYTHROCYTE [SEDIMENTATION RATE] IN BLOOD: 31 MM/HR
FASTING STATUS PATIENT QL REPORTED: YES
GFR SERPLBLD BASED ON 1.73 SQ M-ARVRAT: 86 ML/MIN/1.73M2 (ref 60–?)
GLOBULIN PLAS-MCNC: 4 G/DL (ref 2.8–4.4)
GLUCOSE BLD-MCNC: 83 MG/DL (ref 70–99)
GLUCOSE UR STRIP.AUTO-MCNC: NEGATIVE MG/DL
HCT VFR BLD AUTO: 38.4 %
HGB BLD-MCNC: 11.9 G/DL
IMM GRANULOCYTES # BLD AUTO: 0.01 X10(3) UL (ref 0–1)
IMM GRANULOCYTES NFR BLD: 0.2 %
KETONES UR STRIP.AUTO-MCNC: NEGATIVE MG/DL
LYMPHOCYTES # BLD AUTO: 1.84 X10(3) UL (ref 1–4)
LYMPHOCYTES NFR BLD AUTO: 41.6 %
MCH RBC QN AUTO: 25.9 PG (ref 26–34)
MCHC RBC AUTO-ENTMCNC: 31 G/DL (ref 31–37)
MCV RBC AUTO: 83.7 FL
MONOCYTES # BLD AUTO: 0.58 X10(3) UL (ref 0.1–1)
MONOCYTES NFR BLD AUTO: 13.1 %
NEUTROPHILS # BLD AUTO: 1.89 X10 (3) UL (ref 1.5–7.7)
NEUTROPHILS # BLD AUTO: 1.89 X10(3) UL (ref 1.5–7.7)
NEUTROPHILS NFR BLD AUTO: 42.8 %
NITRITE UR QL STRIP.AUTO: NEGATIVE
OSMOLALITY SERPL CALC.SUM OF ELEC: 285 MOSM/KG (ref 275–295)
PH UR STRIP.AUTO: 5 [PH] (ref 5–8)
PLATELET # BLD AUTO: 375 10(3)UL (ref 150–450)
POTASSIUM SERPL-SCNC: 3.5 MMOL/L (ref 3.5–5.1)
PROT SERPL-MCNC: 7.6 G/DL (ref 6.4–8.2)
PROT UR STRIP.AUTO-MCNC: 30 MG/DL
PROT UR-MCNC: 42.4 MG/DL
PROT/CREAT UR-RTO: 0.14
RBC # BLD AUTO: 4.59 X10(6)UL
RBC UR QL AUTO: NEGATIVE
SODIUM SERPL-SCNC: 139 MMOL/L (ref 136–145)
SP GR UR STRIP.AUTO: 1.02 (ref 1–1.03)
UROBILINOGEN UR STRIP.AUTO-MCNC: <2 MG/DL
VIT D+METAB SERPL-MCNC: 25.6 NG/ML (ref 30–100)
WBC # BLD AUTO: 4.4 X10(3) UL (ref 4–11)

## 2022-10-21 PROCEDURE — 86256 FLUORESCENT ANTIBODY TITER: CPT | Performed by: INTERNAL MEDICINE

## 2022-10-21 PROCEDURE — 82570 ASSAY OF URINE CREATININE: CPT | Performed by: INTERNAL MEDICINE

## 2022-10-21 PROCEDURE — 86160 COMPLEMENT ANTIGEN: CPT | Performed by: INTERNAL MEDICINE

## 2022-10-21 PROCEDURE — 85652 RBC SED RATE AUTOMATED: CPT | Performed by: INTERNAL MEDICINE

## 2022-10-21 PROCEDURE — 85025 COMPLETE CBC W/AUTO DIFF WBC: CPT | Performed by: INTERNAL MEDICINE

## 2022-10-21 PROCEDURE — 86140 C-REACTIVE PROTEIN: CPT | Performed by: INTERNAL MEDICINE

## 2022-10-21 PROCEDURE — 80053 COMPREHEN METABOLIC PANEL: CPT | Performed by: INTERNAL MEDICINE

## 2022-10-21 PROCEDURE — 84156 ASSAY OF PROTEIN URINE: CPT | Performed by: INTERNAL MEDICINE

## 2022-10-21 PROCEDURE — 82306 VITAMIN D 25 HYDROXY: CPT | Performed by: INTERNAL MEDICINE

## 2022-10-21 PROCEDURE — 81001 URINALYSIS AUTO W/SCOPE: CPT | Performed by: INTERNAL MEDICINE

## 2022-10-25 ENCOUNTER — OFFICE VISIT (OUTPATIENT)
Dept: RHEUMATOLOGY | Facility: CLINIC | Age: 27
End: 2022-10-25
Payer: COMMERCIAL

## 2022-10-25 VITALS
DIASTOLIC BLOOD PRESSURE: 68 MMHG | HEIGHT: 63.75 IN | RESPIRATION RATE: 16 BRPM | WEIGHT: 179 LBS | SYSTOLIC BLOOD PRESSURE: 116 MMHG | TEMPERATURE: 98 F | HEART RATE: 90 BPM | BODY MASS INDEX: 30.94 KG/M2 | OXYGEN SATURATION: 99 %

## 2022-10-25 DIAGNOSIS — R76.8 DS DNA ANTIBODY POSITIVE: ICD-10-CM

## 2022-10-25 DIAGNOSIS — M32.12 SYSTEMIC LUPUS ERYTHEMATOSUS (SLE) WITH PERICARDITIS, UNSPECIFIED SLE TYPE (HCC): Primary | ICD-10-CM

## 2022-10-25 DIAGNOSIS — R06.02 SHORTNESS OF BREATH: ICD-10-CM

## 2022-10-25 DIAGNOSIS — E55.9 VITAMIN D DEFICIENCY: ICD-10-CM

## 2022-10-25 DIAGNOSIS — Z79.52 LONG TERM (CURRENT) USE OF SYSTEMIC STEROIDS: ICD-10-CM

## 2022-10-25 DIAGNOSIS — R76.0 ANTIPHOSPHOLIPID ANTIBODY POSITIVE: ICD-10-CM

## 2022-10-25 DIAGNOSIS — Z79.899 HIGH RISK MEDICATION USE: ICD-10-CM

## 2022-10-25 DIAGNOSIS — R76.8 ANA POSITIVE: ICD-10-CM

## 2022-10-25 DIAGNOSIS — I31.39 PERICARDIAL EFFUSION: ICD-10-CM

## 2022-10-25 DIAGNOSIS — R70.0 ELEVATED SED RATE: ICD-10-CM

## 2022-10-25 PROCEDURE — 99215 OFFICE O/P EST HI 40 MIN: CPT | Performed by: INTERNAL MEDICINE

## 2022-10-25 PROCEDURE — 3074F SYST BP LT 130 MM HG: CPT | Performed by: INTERNAL MEDICINE

## 2022-10-25 PROCEDURE — 3078F DIAST BP <80 MM HG: CPT | Performed by: INTERNAL MEDICINE

## 2022-10-25 PROCEDURE — 3008F BODY MASS INDEX DOCD: CPT | Performed by: INTERNAL MEDICINE

## 2022-10-25 RX ORDER — COLCHICINE 0.6 MG/1
0.6 TABLET ORAL DAILY
COMMUNITY
Start: 2022-09-26

## 2022-10-26 ENCOUNTER — TELEPHONE (OUTPATIENT)
Dept: RHEUMATOLOGY | Facility: CLINIC | Age: 27
End: 2022-10-26

## 2022-10-26 RX ORDER — HYDROXYCHLOROQUINE SULFATE 200 MG/1
200 TABLET, FILM COATED ORAL 2 TIMES DAILY
Qty: 180 TABLET | Refills: 0 | Status: SHIPPED | OUTPATIENT
Start: 2022-10-26

## 2022-10-26 RX ORDER — PREDNISONE 1 MG/1
7.5 TABLET ORAL DAILY
Qty: 135 TABLET | Refills: 0 | Status: SHIPPED | OUTPATIENT
Start: 2022-10-26 | End: 2023-01-24

## 2022-10-26 NOTE — PATIENT INSTRUCTIONS
-- continue mycophenolate 1000mg twice daily  -- continue Benlysta injections will increase to every 2 weeks, my staff will work on updated PA  -- continue plaquenil 200mg twice daily   -- take colchicine daily for next week then try to taper off like previously   -- if doing well after the increase in Benlysta, try to decrease prednisone 5mg daily (okay to split 2.5mg twice daily)  -- continue pulmonology follow up and complete pulmonary rehab if recommended  -- continue follow with therapist and try to incorporate mindfulness/meditation  -- repeat labs prior to next visit   -- follow up in 3 months or sooner as needed  -- call with questions/concers in the meantime    Dr. Melanie Barrett

## 2022-11-01 DIAGNOSIS — M32.12 SYSTEMIC LUPUS ERYTHEMATOSUS (SLE) WITH PERICARDITIS, UNSPECIFIED SLE TYPE (HCC): ICD-10-CM

## 2022-11-01 DIAGNOSIS — R76.8 ANA POSITIVE: ICD-10-CM

## 2022-11-01 DIAGNOSIS — R76.8 DS DNA ANTIBODY POSITIVE: ICD-10-CM

## 2022-11-01 RX ORDER — MYCOPHENOLATE MOFETIL 500 MG/1
TABLET ORAL
Qty: 360 TABLET | Refills: 0 | Status: SHIPPED | OUTPATIENT
Start: 2022-11-01

## 2022-12-12 ENCOUNTER — PATIENT MESSAGE (OUTPATIENT)
Dept: RHEUMATOLOGY | Facility: CLINIC | Age: 27
End: 2022-12-12

## 2022-12-12 DIAGNOSIS — M32.12 SYSTEMIC LUPUS ERYTHEMATOSUS (SLE) WITH PERICARDITIS, UNSPECIFIED SLE TYPE (HCC): Primary | ICD-10-CM

## 2022-12-12 RX ORDER — BELIMUMAB 200 MG/ML
200 SOLUTION SUBCUTANEOUS
Qty: 2 EACH | Refills: 2 | Status: SHIPPED | OUTPATIENT
Start: 2022-12-12 | End: 2023-01-09

## 2022-12-12 NOTE — TELEPHONE ENCOUNTER
Future Appointments   Date Time Provider Neftali Escamilla   1/24/2023  8:30 AM Theodore Schaefer, DO [facility]   4/25/2023  8:30 AM Demario Ross, DO [facility] 10/26/22  RTO in 3mo   Labs prior to next RTO

## 2022-12-20 ENCOUNTER — TELEPHONE (OUTPATIENT)
Dept: RHEUMATOLOGY | Facility: CLINIC | Age: 27
End: 2022-12-20

## 2023-01-10 PROBLEM — M32.13: Status: ACTIVE | Noted: 2021-03-24

## 2023-01-10 PROBLEM — M16.9 OSTEOARTHRITIS OF HIP: Status: ACTIVE | Noted: 2023-01-10

## 2023-01-10 PROBLEM — D50.0 ANEMIA DUE TO CHRONIC BLOOD LOSS: Status: ACTIVE | Noted: 2023-01-10

## 2023-01-10 PROBLEM — D70.9 NEUTROPENIA (HCC): Status: ACTIVE | Noted: 2023-01-10

## 2023-01-14 ENCOUNTER — LAB ENCOUNTER (OUTPATIENT)
Dept: LAB | Facility: HOSPITAL | Age: 28
End: 2023-01-14
Attending: INTERNAL MEDICINE
Payer: COMMERCIAL

## 2023-01-14 DIAGNOSIS — K92.1 HEMATOCHEZIA: ICD-10-CM

## 2023-01-14 DIAGNOSIS — R76.8 DS DNA ANTIBODY POSITIVE: ICD-10-CM

## 2023-01-14 DIAGNOSIS — Z79.899 HIGH RISK MEDICATION USE: ICD-10-CM

## 2023-01-14 DIAGNOSIS — M32.12 SYSTEMIC LUPUS ERYTHEMATOSUS (SLE) WITH PERICARDITIS, UNSPECIFIED SLE TYPE (HCC): ICD-10-CM

## 2023-01-14 DIAGNOSIS — R13.19 ESOPHAGEAL DYSPHAGIA: ICD-10-CM

## 2023-01-14 DIAGNOSIS — Z79.52 LONG TERM (CURRENT) USE OF SYSTEMIC STEROIDS: ICD-10-CM

## 2023-01-14 DIAGNOSIS — R70.0 ELEVATED SED RATE: ICD-10-CM

## 2023-01-14 DIAGNOSIS — E55.9 VITAMIN D DEFICIENCY: ICD-10-CM

## 2023-01-14 LAB
ALBUMIN SERPL-MCNC: 3.6 G/DL (ref 3.4–5)
ALBUMIN/GLOB SERPL: 0.9 {RATIO} (ref 1–2)
ALP LIVER SERPL-CCNC: 56 U/L
ALT SERPL-CCNC: 16 U/L
ANION GAP SERPL CALC-SCNC: 3 MMOL/L (ref 0–18)
AST SERPL-CCNC: 18 U/L (ref 15–37)
BASOPHILS # BLD AUTO: 0.02 X10(3) UL (ref 0–0.2)
BASOPHILS NFR BLD AUTO: 0.5 %
BILIRUB SERPL-MCNC: 0.4 MG/DL (ref 0.1–2)
BILIRUB UR QL STRIP.AUTO: NEGATIVE
BUN BLD-MCNC: 8 MG/DL (ref 7–18)
C3 SERPL-MCNC: 102 MG/DL (ref 90–180)
C4 SERPL-MCNC: 24.7 MG/DL (ref 10–40)
CALCIUM BLD-MCNC: 9.3 MG/DL (ref 8.5–10.1)
CHLORIDE SERPL-SCNC: 110 MMOL/L (ref 98–112)
CLARITY UR REFRACT.AUTO: CLEAR
CO2 SERPL-SCNC: 25 MMOL/L (ref 21–32)
COLOR UR AUTO: YELLOW
CREAT BLD-MCNC: 0.89 MG/DL
CREAT UR-SCNC: 350 MG/DL
CRP SERPL-MCNC: 0.8 MG/DL (ref ?–0.3)
EOSINOPHIL # BLD AUTO: 0.03 X10(3) UL (ref 0–0.7)
EOSINOPHIL NFR BLD AUTO: 0.7 %
ERYTHROCYTE [DISTWIDTH] IN BLOOD BY AUTOMATED COUNT: 15.3 %
ERYTHROCYTE [SEDIMENTATION RATE] IN BLOOD: 34 MM/HR
FASTING STATUS PATIENT QL REPORTED: YES
GFR SERPLBLD BASED ON 1.73 SQ M-ARVRAT: 91 ML/MIN/1.73M2 (ref 60–?)
GLOBULIN PLAS-MCNC: 3.8 G/DL (ref 2.8–4.4)
GLUCOSE BLD-MCNC: 82 MG/DL (ref 70–99)
GLUCOSE UR STRIP.AUTO-MCNC: NEGATIVE MG/DL
HCT VFR BLD AUTO: 36.1 %
HGB BLD-MCNC: 11.3 G/DL
IMM GRANULOCYTES # BLD AUTO: 0.01 X10(3) UL (ref 0–1)
IMM GRANULOCYTES NFR BLD: 0.2 %
KETONES UR STRIP.AUTO-MCNC: NEGATIVE MG/DL
LEUKOCYTE ESTERASE UR QL STRIP.AUTO: NEGATIVE
LYMPHOCYTES # BLD AUTO: 1.58 X10(3) UL (ref 1–4)
LYMPHOCYTES NFR BLD AUTO: 39.1 %
MCH RBC QN AUTO: 25.9 PG (ref 26–34)
MCHC RBC AUTO-ENTMCNC: 31.3 G/DL (ref 31–37)
MCV RBC AUTO: 82.8 FL
MONOCYTES # BLD AUTO: 0.53 X10(3) UL (ref 0.1–1)
MONOCYTES NFR BLD AUTO: 13.1 %
NEUTROPHILS # BLD AUTO: 1.87 X10 (3) UL (ref 1.5–7.7)
NEUTROPHILS # BLD AUTO: 1.87 X10(3) UL (ref 1.5–7.7)
NEUTROPHILS NFR BLD AUTO: 46.4 %
NITRITE UR QL STRIP.AUTO: NEGATIVE
OSMOLALITY SERPL CALC.SUM OF ELEC: 283 MOSM/KG (ref 275–295)
PH UR STRIP.AUTO: 5.5 [PH] (ref 5–8)
PLATELET # BLD AUTO: 308 10(3)UL (ref 150–450)
POTASSIUM SERPL-SCNC: 4 MMOL/L (ref 3.5–5.1)
PROT SERPL-MCNC: 7.4 G/DL (ref 6.4–8.2)
PROT UR-MCNC: 40.7 MG/DL
PROT/CREAT UR-RTO: 0.12
RBC # BLD AUTO: 4.36 X10(6)UL
RBC UR QL AUTO: NEGATIVE
SODIUM SERPL-SCNC: 138 MMOL/L (ref 136–145)
SP GR UR STRIP.AUTO: >=1.03 (ref 1–1.03)
UROBILINOGEN UR STRIP.AUTO-MCNC: 0.2 MG/DL
VIT D+METAB SERPL-MCNC: 22.6 NG/ML (ref 30–100)
WBC # BLD AUTO: 4 X10(3) UL (ref 4–11)

## 2023-01-14 PROCEDURE — 36415 COLL VENOUS BLD VENIPUNCTURE: CPT

## 2023-01-14 PROCEDURE — 85652 RBC SED RATE AUTOMATED: CPT

## 2023-01-14 PROCEDURE — 86038 ANTINUCLEAR ANTIBODIES: CPT

## 2023-01-14 PROCEDURE — 81003 URINALYSIS AUTO W/O SCOPE: CPT

## 2023-01-14 PROCEDURE — 86235 NUCLEAR ANTIGEN ANTIBODY: CPT

## 2023-01-14 PROCEDURE — 86140 C-REACTIVE PROTEIN: CPT

## 2023-01-14 PROCEDURE — 86160 COMPLEMENT ANTIGEN: CPT

## 2023-01-14 PROCEDURE — 80053 COMPREHEN METABOLIC PANEL: CPT

## 2023-01-14 PROCEDURE — 82570 ASSAY OF URINE CREATININE: CPT

## 2023-01-14 PROCEDURE — 86225 DNA ANTIBODY NATIVE: CPT

## 2023-01-14 PROCEDURE — 82306 VITAMIN D 25 HYDROXY: CPT

## 2023-01-14 PROCEDURE — 85025 COMPLETE CBC W/AUTO DIFF WBC: CPT

## 2023-01-14 PROCEDURE — 84156 ASSAY OF PROTEIN URINE: CPT

## 2023-01-16 LAB
CENTROMERE IGG SER-ACNC: 0.8 U/ML
DSDNA IGG SERPL IA-ACNC: 117 IU/ML
ENA AB SER QL IA: >55 UG/L
ENA AB SER QL IA: POSITIVE
ENA JO1 AB SER IA-ACNC: 0.3 U/ML
ENA RNP IGG SER IA-ACNC: 0.4 U/ML
ENA SCL70 IGG SER IA-ACNC: 0.8 U/ML
ENA SM IGG SER IA-ACNC: 0.8 U/ML
ENA SS-A IGG SER IA-ACNC: >240 U/ML
ENA SS-B IGG SER IA-ACNC: 1.7 U/ML
U1 SNRNP IGG SER IA-ACNC: 1 U/ML

## 2023-01-24 ENCOUNTER — OFFICE VISIT (OUTPATIENT)
Dept: RHEUMATOLOGY | Facility: CLINIC | Age: 28
End: 2023-01-24
Payer: COMMERCIAL

## 2023-01-24 VITALS
BODY MASS INDEX: 31.28 KG/M2 | TEMPERATURE: 98 F | HEIGHT: 63.75 IN | DIASTOLIC BLOOD PRESSURE: 60 MMHG | RESPIRATION RATE: 16 BRPM | HEART RATE: 90 BPM | OXYGEN SATURATION: 100 % | WEIGHT: 181 LBS | SYSTOLIC BLOOD PRESSURE: 114 MMHG

## 2023-01-24 DIAGNOSIS — R76.8 DS DNA ANTIBODY POSITIVE: ICD-10-CM

## 2023-01-24 DIAGNOSIS — Z79.52 LONG TERM (CURRENT) USE OF SYSTEMIC STEROIDS: ICD-10-CM

## 2023-01-24 DIAGNOSIS — E55.9 VITAMIN D DEFICIENCY: ICD-10-CM

## 2023-01-24 DIAGNOSIS — Z79.899 HIGH RISK MEDICATION USE: ICD-10-CM

## 2023-01-24 DIAGNOSIS — R76.0 ANTIPHOSPHOLIPID ANTIBODY POSITIVE: ICD-10-CM

## 2023-01-24 DIAGNOSIS — R76.8 ANA POSITIVE: ICD-10-CM

## 2023-01-24 DIAGNOSIS — D64.9 ANEMIA, UNSPECIFIED TYPE: ICD-10-CM

## 2023-01-24 DIAGNOSIS — M32.12 SYSTEMIC LUPUS ERYTHEMATOSUS (SLE) WITH PERICARDITIS, UNSPECIFIED SLE TYPE (HCC): Primary | ICD-10-CM

## 2023-01-24 PROCEDURE — 99215 OFFICE O/P EST HI 40 MIN: CPT | Performed by: INTERNAL MEDICINE

## 2023-01-24 PROCEDURE — 3074F SYST BP LT 130 MM HG: CPT | Performed by: INTERNAL MEDICINE

## 2023-01-24 PROCEDURE — 3008F BODY MASS INDEX DOCD: CPT | Performed by: INTERNAL MEDICINE

## 2023-01-24 PROCEDURE — 3078F DIAST BP <80 MM HG: CPT | Performed by: INTERNAL MEDICINE

## 2023-01-24 RX ORDER — HYDROXYCHLOROQUINE SULFATE 200 MG/1
200 TABLET, FILM COATED ORAL 2 TIMES DAILY
Qty: 180 TABLET | Refills: 0 | Status: SHIPPED | OUTPATIENT
Start: 2023-01-24

## 2023-01-24 RX ORDER — MYCOPHENOLATE MOFETIL 500 MG/1
1000 TABLET ORAL 2 TIMES DAILY
Qty: 360 TABLET | Refills: 0 | Status: SHIPPED | OUTPATIENT
Start: 2023-01-24

## 2023-01-24 RX ORDER — ERGOCALCIFEROL 1.25 MG/1
50000 CAPSULE ORAL WEEKLY
Qty: 12 CAPSULE | Refills: 0 | Status: SHIPPED | OUTPATIENT
Start: 2023-01-24 | End: 2023-04-18

## 2023-01-24 RX ORDER — PREDNISONE 1 MG/1
7.5 TABLET ORAL DAILY
Qty: 135 TABLET | Refills: 0 | Status: SHIPPED | OUTPATIENT
Start: 2023-01-24 | End: 2023-04-24

## 2023-01-25 NOTE — PATIENT INSTRUCTIONS
-- continue mycophenolate 1000mg twice daily  -- continue Benlysta injections every 2 weeks  -- continue plaquenil 200mg twice daily   -- decrease prednisone to alternating 5mg with 7.5mg every other day and attempt to get down slowly to 5mg daily by next appt.    -- take colchicine only as needed   -- start vitamin d once weekly x 12 weeks, okay to take OTC 1000IU daily additionally  -- continue pulmonology follow up and participate in pulmonary rehab   -- continue follow with therapist and try to incorporate mindfulness/meditation  -- update me after GI evaluation  -- also update me if you find that massage therapy is covered by insurance  -- remember yearly exams while on plaquenil   -- repeat labs prior to next visit   -- follow up in 3 months or sooner as needed  -- call with questions/concers in the meantime    Dr. Ct Beckman

## 2023-02-03 ENCOUNTER — HOSPITAL ENCOUNTER (OUTPATIENT)
Dept: ULTRASOUND IMAGING | Age: 28
Discharge: HOME | End: 2023-02-03
Attending: STUDENT IN AN ORGANIZED HEALTH CARE EDUCATION/TRAINING PROGRAM
Payer: COMMERCIAL

## 2023-02-03 DIAGNOSIS — K80.50 BILIARY COLIC: ICD-10-CM

## 2023-02-03 PROCEDURE — 76705 ECHO EXAM OF ABDOMEN: CPT | Performed by: STUDENT IN AN ORGANIZED HEALTH CARE EDUCATION/TRAINING PROGRAM

## 2023-02-03 PROCEDURE — 76700 US EXAM ABDOM COMPLETE: CPT | Performed by: STUDENT IN AN ORGANIZED HEALTH CARE EDUCATION/TRAINING PROGRAM

## 2023-02-16 DIAGNOSIS — D21.9 FIBROID: Primary | ICD-10-CM

## 2023-02-20 ENCOUNTER — LAB ENCOUNTER (OUTPATIENT)
Dept: LAB | Age: 28
End: 2023-02-20
Attending: STUDENT IN AN ORGANIZED HEALTH CARE EDUCATION/TRAINING PROGRAM
Payer: COMMERCIAL

## 2023-02-20 DIAGNOSIS — Z01.818 PRE-OP TESTING: ICD-10-CM

## 2023-02-21 LAB — SARS-COV-2 RNA RESP QL NAA+PROBE: NOT DETECTED

## 2023-02-23 PROBLEM — R12 HEARTBURN: Status: ACTIVE | Noted: 2023-02-23

## 2023-02-23 PROBLEM — K92.1 HEMATOCHEZIA: Status: ACTIVE | Noted: 2023-02-23

## 2023-02-23 PROBLEM — D50.9 IRON DEFICIENCY ANEMIA, UNSPECIFIED: Status: ACTIVE | Noted: 2023-02-23

## 2023-02-23 PROBLEM — R13.10 DYSPHAGIA, UNSPECIFIED: Status: ACTIVE | Noted: 2023-02-23

## 2023-04-06 DIAGNOSIS — M32.12 SYSTEMIC LUPUS ERYTHEMATOSUS (SLE) WITH PERICARDITIS, UNSPECIFIED SLE TYPE (HCC): ICD-10-CM

## 2023-04-06 RX ORDER — HYDROXYCHLOROQUINE SULFATE 200 MG/1
TABLET, FILM COATED ORAL
Qty: 180 TABLET | Refills: 0 | Status: SHIPPED | OUTPATIENT
Start: 2023-04-06

## 2023-04-06 NOTE — TELEPHONE ENCOUNTER
Future Appointments   Date Time Provider Neftali Escamilla   4/25/2023  8:30 AM Jevon Larios, DO [facility]   7/25/2023  8:30 AM Dsilva, Karyle Leaven, DO [facility]     Last office visit: 1/24/2023    Last fill: 1/242023 180 tab, 0 refills

## 2023-04-25 NOTE — PATIENT INSTRUCTIONS
-- get updated CT angio of chest  -- obtain right shoulder xray  -- try muscle relaxant - cyclobenzaprine 5mg nightly for next 1-2 weeks and update me if helping  -- increase iron supplementation to twice daily  -- take pantoprazole given by GI regularly   -- continue mycophenolate 1000mg twice daily  -- continue Benlysta injections every 2 weeks  -- continue plaquenil 200mg twice daily   -- decrease prednisone to 5mg daily  -- take colchicine only as needed   -- continue pulmonology follow up and participate in pulmonary rehab   -- continue follow with therapist and try to incorporate mindfulness/meditation; will need to consider low dose antidepressant if symptoms continue   -- massage therapy script handed to you in office today   -- remember yearly exams while on plaquenil   -- repeat labs prior to next visit   -- follow up in 3 months or sooner as needed  -- call with questions/concers in the meantime    Dr. Shailesh Vivar

## 2023-05-05 NOTE — TELEPHONE ENCOUNTER
Future Appointments   Date Time Provider Neftali Escamilla   5/8/2023  9:30 AM Harper Ramirez DO [facility]   7/25/2023  8:30 AM Germaine Ross DO [facility]   10/18/2023  1:30 PM Germaine Ross DO [facility]     LOV 4/25/23  RTO in 3mo

## 2023-05-05 NOTE — TELEPHONE ENCOUNTER
From: Thierno Deshpande  To: Lachelle Alfonso DO  Sent: 5/5/2023 8:51 AM CDT  Subject: Saskia Cagle refill for Arita Corral Dr Romayne Grip! The special pharmacy that provides my benlysta recently called and said I needed a refill for it.

## 2023-05-08 NOTE — PATIENT INSTRUCTIONS
Continue to exercise at least 150 minutes a week and Eat a plant based diet   Please have labs done    Please see Dr. Brandy Arvizu down on carbs for dinner    I have ordered an inhaler for you

## 2023-05-18 NOTE — TELEPHONE ENCOUNTER
Need refill of prednisone 5mg sent to [facility]. Pended. Benlysta now must be filled with [facility] and script needs to be sent there. Pended. It is unclear if we got new prior authorization with new pharmacy benefit provider. Will look into this.      LOV: 04/25/2023  Future Appointments   Date Time Provider Neftali Escamilla   7/25/2023  8:30 AM Ysabel Ross, DO [facility]   10/18/2023  1:30 PM Ysabel Ross, DO [facility]

## 2023-07-10 NOTE — TELEPHONE ENCOUNTER
Future Appointments   Date Time Provider Neftali Escamilla   7/25/2023  8:30 AM Theodore Schaefer, DO [facility]   10/18/2023  1:30 PM Germaine Ross,  [facility]     LOV  4/25/2023    Last refill 5/18/2023  4 each, 1 refill

## 2023-07-21 NOTE — TELEPHONE ENCOUNTER
Future Appointments   Date Time Provider Neftali Escamilla   7/25/2023  6:30 AM [address] [place] [address] CT RM1 CARD [address] [place] [address] CT  [address] [place] [address]   7/25/2023  8:30 AM Jose R Ross DO [facility]   10/18/2023  1:30 PM Jose R Ross DO [facility] [place]     Last office visit: 4/25/2023    Last fill: 1/24/2023 360 tab, 0 refills    Last lab: 7/21/2023

## 2023-07-25 NOTE — PATIENT INSTRUCTIONS
-- get updated 2D ECHO and follow with cardiology   -- follow up with hematology or PCP for iron deficiency anemia   -- increase mycophenolate to 1500mg in am and 1000mg in pm  -- prednisone taper of 20mg daily x 5 days, then 10mg daily x 5 days, then resume 5mg daily   -- take colchicine daily for 7 days   -- continue Benlysta injections every 2 weeks; look into [facility] as next step if increase in MMF not helpful   -- continue plaquenil 200mg twice daily   -- okay to take vitamin d 2000IU over the counter daily   -- continue pantoprazole given by GI regularly   -- continue pulmonology follow up and consider pulmonary rehab   -- continue follow with therapist and try to incorporate mindfulness/meditation; will need to consider low dose antidepressant if symptoms continue   -- remember yearly eye exams while on plaquenil   -- repeat labs in 4 weeks to monitor for toxicities   -- follow up in 3 months or sooner as needed  -- call with questions/concers in the meantime    Dr. Deng Brain

## 2023-07-26 NOTE — TELEPHONE ENCOUNTER
Patient calling in regards to lab results for Iron, she is asking for an order for an infusion ASAP. Patient saw rheumatologist and went over results yesterday. Please advise.

## 2023-07-26 NOTE — TELEPHONE ENCOUNTER
See message below from [facility] request for infusion. Ok to place referral for hematology or please advise?     Labs completed on 07/21    Patient seen by rheum Dr Mena Noble on 07/25  -- follow up with hematology or PCP for iron deficiency anemia        LOV:  05/08/2023 Dr Alban Fleming

## 2023-07-26 NOTE — TELEPHONE ENCOUNTER
I reviewed her last ferritin levels and it has improved.  She can follow up with gynecology for the fibroids and continue with current iron supplement    Liana Ramirez DO

## 2023-07-26 NOTE — TELEPHONE ENCOUNTER
Her ferritin is in the lower end of normal and we should try an oral iron supplement first.  Usually we recommend 325 mg of ferrous sulfate 1 tablet daily with food. This does cause constipation so for the constipation she needs to increase fiber in her diet as well as consider MiraLAX if very constipated. We will recheck her labs in 3 months. Does she have heavy menses?  Has she been intolerant to oral supplement in the past?    [address] [place] [address] DO

## 2023-07-26 NOTE — TELEPHONE ENCOUNTER
Spoke to patient. Patients states that she has been taking OTC iron for 3-4 months. She tolerates the supplements and takes magnesium to help with GI symptoms when needed. She says that since her levels are dropping more compared to last blood draw, she was under impression that Dr Gutierrez Galdamez was recommending transfusion. Her last menstrual cycle was during her blood draw. Started on 07/20. She heavily bleeds for typically 4 days - states that her length of cycle has shortened but is more heavy. Wears tampon and pad. Has 3 fibroids and not sure if this can be contributing. She c/o feeling fatigues, dizzy with position changes, occasional headaches, SOB, chest pain with activity - states this has been an ongoing symptom for her and SV and rheum are aware, Denies any rapid HR/palpitation. Always feels cold and has muscle pains. Patient not sure if these symptoms are all related to her SLE. Would you like her to follow up with hematology? Please advise.

## 2023-10-04 NOTE — TELEPHONE ENCOUNTER
Future Appointments   Date Time Provider Neftali Francine   10/10/2023  2:15 PM [facility] [place]   10/18/2023  1:30 PM Germaine Ross,  [facility]   1/26/2024 12:30 PM Sunni Ross,  [facility]

## 2023-10-11 NOTE — PROGRESS NOTES
Called lab to add homocysteine -not able to add on- needs to be put on ice for viability. Patient will go Saturday morning for additional draw.

## 2023-11-28 NOTE — TELEPHONE ENCOUNTER
Appt scheduled    Future Appointments   Date Time Provider Neftali Escamilla   11/28/2023 12:45 PM Sudha Baumann DO [facility] [place]

## 2023-11-28 NOTE — PATIENT INSTRUCTIONS
Try allegra or claritin  I have sent a prescription for the steroid cream, but you can try the pill first. If symptoms worsen let me know

## 2023-11-28 NOTE — TELEPHONE ENCOUNTER
I can do 11:15 or 12:45.  If she wants to do a VV that is fine as well  [address] [place] [address] DO

## 2023-12-12 NOTE — PROGRESS NOTES
[facility] General Pulmonary Consult Note    Chief Complaint:  Chief Complaint   Patient presents with    New Patient     Dyspnea with minimal exertion  CT done 7/25       History of Present Illness:  Eleanor Martinez is a 29 year old female who presents today for evaluation of shortness of breath.  Patient has been getting winded with fairly minimal exertion.  No history of asthma.  Never smoker.  Mother did have asthma.  Is getting autoimmune work up, which is pending.  Was previously quite functional and has now been getting winded with fairly minimal exertion.       Past Medical History:   Past Medical History:    Abdominal pain    Allergic rhinitis    Anemia    Anxiety    Back pain    Belching    Bloating    Blood in the stool    Change in hair    Chest pain    Chest pain on exertion    Constipation    Diarrhea, unspecified    Easy bruising    Esophageal reflux    Fatigue    Flatulence/gas pain/belching    Food intolerance    Frequent use of laxatives    Headache disorder    Heartburn    Indigestion    Irregular bowel habits    Lupus (systemic lupus erythematosus) (HCC)    Menses painful    Pain in joints    Pleural effusion    Problems with swallowing    Not often only when drinking large amounts of water worh medication    Shortness of breath    Sleep disturbance    Vomiting    Extreme cases    Wears glasses    Weight gain        Past Surgical History:   Past Surgical History:   Procedure Laterality Date    Colonoscopy         Family Medical History:   Family History   Problem Relation Age of Onset    Diabetes Mother     Asthma Mother     Allergies Mother         fam hx        Social History:   Social History     Socioeconomic History    Marital status: Single     Spouse name: Not on file    Number of children: Not on file    Years of education: Not on file    Highest education level: Not on file   Occupational History    Not on file   Tobacco Use    Smoking status: Never    Smokeless tobacco: Never   Vaping  Use    Vaping status: Never Used   Substance and Sexual Activity    Alcohol use: Not Currently     Alcohol/week: 1.0 standard drink of alcohol     Types: 1 Glasses of wine per week     Comment: social    Drug use: No    Sexual activity: Never   Other Topics Concern     Service Not Asked    Blood Transfusions Not Asked    Caffeine Concern No    Occupational Exposure Not Asked    Hobby Hazards Not Asked    Sleep Concern Not Asked    Stress Concern Not Asked    Weight Concern Yes    Special Diet Not Asked    Back Care Not Asked    Exercise Yes    Bike Helmet Not Asked    Seat Belt Not Asked    Self-Exams Not Asked   Social History Narrative    Not on file     Social Drivers of Health     Financial Resource Strain: Not on file   Food Insecurity: Not on file   Transportation Needs: Not on file   Physical Activity: Not on file   Stress: Not on file   Social Connections: Not on file   Housing Stability: Low Risk  (6/14/2024)    Received from [facility]    Housing Stability     Mortgage Payment Concerns?: Not on file     Number of Places Lived in the Last Year: Not on file     Unstable Housing?: Not on file        Allergies: Patient has no known allergies.     Medications:   Current Outpatient Medications   Medication Sig Dispense Refill    FEROSUL 325 (65 Fe) MG Oral Tab TAKE 1 TABLET BY MOUTH TWICE DAILY WITH MEALS 180 tablet 0    amphetamine-dextroamphetamine ER 10 MG Oral Capsule SR 24 Hr Take 1 capsule (10 mg total) by mouth every morning.      azaTHIOprine 50 MG Oral Tab Take 1 tablet (50 mg total) by mouth daily. 90 tablet 0    hydroxychloroquine 200 MG Oral Tab Take 1 tablet (200 mg total) by mouth 2 (two) times daily. 180 tablet 0    ERGOCALCIFEROL 1.25 MG (43196 UT) Oral Cap TAKE 1 CAPSULE BY MOUTH 1 TIME A WEEK 12 capsule 0    predniSONE 5 MG Oral Tab Take 1 tablet (5 mg total) by mouth daily. 90 tablet 0    betamethasone dipropionate 0.05 % External Lotion APPLY TO THE AFFECTED AREA  OF RIGHT SCALP TWICE DAILY         Review of Systems: Review of Systems    Physical Exam:  /68 (BP Location: Right arm, Patient Position: Sitting, Cuff Size: adult)   Pulse 76   Resp 16   Ht 5' 3\" (1.6 m)   Wt 178 lb (80.7 kg)   LMP 05/01/2023 (Exact Date)   SpO2 100%   BMI 31.53 kg/m²      Constitutional: alert, cooperative. No acute distress.  HEENT: Head NC/AT. Nasal turbinates appear normal.  Mallimpati 1+  Cardio: Regular rate and rhythm. Normal S1 and S2. No murmurs.   Respiratory: Thorax symmetrical with no labored breathing. Clear to ausculation bilaterally with symmetrical breath sounds. No wheezing, rhonchi, rales, or crackles.   GI: NABS. Abd soft, non-tender.  Extremities: No clubbing or cyanosis. No BLE edema.    Neurologic: A&Ox3. No gross motor deficits.  Skin: Warm, dry  Psych: Calm, cooperative. Pleasant affect.    Results:  Personally reviewed  WBC: 2.6, done on 12/23/2024.  HGB: 10.9, done on 12/23/2024.  PLT: 345, done on 12/23/2024.     Glucose: 69, done on 10/5/2024.  Cr: 0.9, done on 10/5/2024.  GFR(AA): 98, done on 7/29/2022.  GFR (non-AA): 85, done on 7/29/2022.  CA: 9.5, done on 10/5/2024.  Na: 140, done on 10/5/2024.  K: 3.6, done on 10/5/2024.  Cl: 105, done on 10/5/2024.  CO2: 25, done on 10/5/2024.  Last ALB was 3.74% done on 12/23/2024.     No results found.     PFT available via patients phone  1/22/20 FEV1 1.29 40% ratio 84 TLC 45 DLCO 58  8/3/22 FEV1 1.80 65 ratio 86 DLCO 69%    Assessment/Plan:  #1. Shortness of breath, unclear etiology at this time  Recheck PFTs  Check CPET  Autoimmune panel pending, has fu with rheumatology pending      Return in about 6 weeks (around 1/23/2024).    Jelani Jim MD  12/12/2023

## 2024-01-26 NOTE — PROGRESS NOTES
RHEUMATOLOGY FOLLOW UP   Date of visit: 01/26/2024  ?  Chief Complaint   Patient presents with    Follow - Up     LOV 10/18/2023. Doing well but still has some chest pain. Not so much swelling any more in her feet/legs. She has occasional joint pain but not consistent. She states she saw her Pulmonologist earlier today and cardiology late last year. She had an eye exam at [facility] about two weeks ago.      ASSESSMENT, DISCUSSION & PLAN   Assessment:  1. Systemic lupus erythematosus (SLE) with pericarditis, unspecified SLE type (HCC)    2. Other iron deficiency anemia    3. Antiphospholipid antibody positive    4. Long term (current) use of systemic steroids    5. Shortness of breath    6. Other chest pain    7. High risk medication use        Discussion:  Ms. Eleanor Martinez is a 27 yo woman who was otherwise healthy, until she was diagnosed with lupus in 2019. She has had pericarditis, pleuritis as well as fatigue and oral ulcers. Chart review reveals significant diffuse lymphadenopathy at time of diagnosis. She was initially placed on steroids and mycophenolate. Had colchicine added to her regimen due to persistent chest pain. Most recently, her mycophenolate was denied by her insurance so pt started on benlysta. She has not been able to get back on her regimen and is doing well overall. While she get some shortness of breath and pains, they do not seem related to lupus. And has been told by pulm likely more neuro-muscle related. She may benefit from pulm rehab which she is working with their office on.   Previously discussed that some of her depression/anxiety may be contributing.     At this time, she has been doing well overall. Still with some joint pain which is manageable. Still has chest pain but workup through pulm earlier this year negative and work up through cardiology thus far negative. Her CTA of the chest showed normal lungs but some cardiomegaly. Prior pericardial effusion has  resolved. Spoke to pulm and after his personal review of CTA, there is concern for pulm artery enlargement.  Will have her get repeat CTA and follow back with cardiology to consider RHC to better evaluate the pulm artery pressure. This could be contributing to her symptoms.  Discussed possible switch from MMF to AZA which pt is comfortable if needed.   Will need to get TPMT levels. She does need further lupus testing as well so will have her get further labs done and go from there      -- continue mycophenolate to 1000mg in am and 1000mg in pm for now  -- read about azathioprine   -- continue prednisone 5mg daily   -- stay off colchicine and benlysta for now  -- continue plaquenil 200mg twice daily   -- okay to take vitamin d 2000IU over the counter daily   -- continue pantoprazole given by GI regularly   -- continue pulmonology and cardiology follow up  -- continue follow with therapist and try to incorporate mindfulness/meditation; will need to consider low dose antidepressant if symptoms continue   -- remember yearly eye exams while on plaquenil   -- follow up in 3 months or sooner as needed  -- call with questions/concers in the meantime    We discussed the risks and benefits of the use of azathioprine at length, including increased risk of infection and malignancy. Azathioprine can cause leukopenia and change blood counts, as well as cause a change in the liver enzymes. Initially, azathioprine can result in upset stomach as well. Eleanor Dasmoah Juan understands this risk, and agrees that the benefits outweigh this risk for her systemic inflammatory disease.  - We will likely go up to 100mg daily of Azathioprine if tolerated at the lower dose    TPMT <5.0 units, do not use azathioprine  TPMT 5-13.7 units, 0.5?mg/kg maximum dose  TPMT 13.7-19.0 units, 1.5?mg/kg maximum dose  TPMT >19.0 units, 2.5?mg/kg maximum dose      Patient verbalized understanding of above instructions. No further questions at this  time.    Code selection for this visit was based on time spent (40min) on date of service in preparing to see the patient, obtaining and/or reviewing separately obtained history, performing a medically appropriate examination, counseling and educating the patient/family/caregiver, ordering medications or testing, referring and communicating with other healthcare providers, documenting clinical information in the E HR, independently interpreting results and communicating results to the patient/family/caregiver and care coordination with the patient's other providers.       Plan:  Diagnoses and all orders for this visit:    Systemic lupus erythematosus (SLE) with pericarditis, unspecified SLE type (HCC)  -     CT ANGIOGRAPHY, CHEST (CPT=71275); Future  -     Thiopurine Methyltransferase; Future  -     Comp Metabolic Panel (14) [E]; Future  -     Complement C3, Serum; Future  -     Complement C4, Serum; Future  -     Urinalysis, Routine [E]; Future  -     Protein/Creatinine Ratio, Urine Random; Future  -     hydroxychloroquine 200 MG Oral Tab; TAKE 1 TABLET BY MOUTH EVERY MORNING AND TAKE 1 TABLET BY MOUTH AT BEDTIME  -     predniSONE 5 MG Oral Tab; Take 1 tablet (5 mg total) by mouth daily.    Other iron deficiency anemia  -     Iron And Tibc; Future    Antiphospholipid antibody positive    Long term (current) use of systemic steroids  -     Thiopurine Methyltransferase; Future  -     Comp Metabolic Panel (14) [E]; Future  -     Complement C3, Serum; Future  -     Complement C4, Serum; Future  -     Urinalysis, Routine [E]; Future  -     Protein/Creatinine Ratio, Urine Random; Future    Shortness of breath  -     CT ANGIOGRAPHY, CHEST (CPT=71275); Future    Other chest pain  -     CT ANGIOGRAPHY, CHEST (CPT=71275); Future    High risk medication use  -     Thiopurine Methyltransferase; Future  -     Comp Metabolic Panel (14) [E]; Future  -     Complement C3, Serum; Future  -     Complement C4, Serum; Future  -      Urinalysis, Routine [E]; Future  -     Protein/Creatinine Ratio, Urine Random; Future          No follow-ups on file.  ?  HPI   Eleanor Martinez is a 28 year old female with the following active problems who recently transitioned care to me. She has a history of lupus (GURDEEP, dsDNA+) with joint pain, fatigue, pericarditis and pleuritis- on cellcept, benlysta, prednisone and colchicine. She presents for follow up.     Since her last visit, she has been doing okay.   Feeling about the same.     Has been exercising regularly and trying to lose weight. Lost almost 10# since her last visit. Has been increasing the intensity of work out. Does both cardio and strength training. Stops when she feels fatigue/short of breath but is able to do at least 30 minutes     The prior feet swelling improved with the activity. Still aggravated by certain foods but better overall.     Still with joint pain in the left thumb/wrist. Was not able to tolerate push ups with worsened pain the following day. Has not been a daily bother.   Also had some left knee pain after activity  Prior shoulder pain is better and so sleep is better overall.     Feels certain foods trigger her pain- like chinese food/fried rice, ETOH, garlic.     Denies any other current joint pain or swelling    + fatigue still there but better overall. Lower energy this week, is on her menstrual cycle- slightly heavier than usual. Has been taking iron daily since summer. Denies improvement of energy but has noticed improvement of hair loss.     Still has intermittent chest pain/shortness of breath.  Denies cough or hemoptysis   Stopped pantoprazole in the mornings and adjusted diet. Still gets reflux intermittently but more controllable.   Denies taking b12 supplementation or biotin to her knowledge.    But does taking magnesium, vit c, protein powder.     Cardiology- Dr. Sutherland. ECHO completed, normal per pt. Told no pulm hypertension  Pulmonology - Dr. Gonzalez - concerned  for pulm HTN given size of pulm artery on last CTA. Per pt, does not think as related to neuromuscular weakness like before. All levels on recent PFT were low but SNIF relatively normal.     + dry eyes when wearing contacts, has recommended OTC drops but only uses as needed (refresh) continues to use daily contacts. Pt states she does not notice symptoms daily.   Denies dry mouth  Denies oral or nasal ulcers  Denies skin rashes or photosensitivity.   + some sound sensitivity     Currently taking:  Plaquenil 200mg BID- last eye exam in December ([facility] [place]- new provider; told everything normal)  Cellcept 1000mg BID  Prednisone 5mg daily  Colchicine not needed since her last visit.     Never tried azathioprine in the past  Last Benlysta was in the summer 2023    Systemic Lupus Erythematosus Disease Activity Index 2000 (SLEDAI-2K) from [employer]  on 10/18/2023  SLEDAI - 2 points   Recent onset seizure --> 0 = No  Psychosis --> 0 = No  Organic brain syndrome --> 0 = No  Visual disturbance --> 0 = No  New onset sensory or motor neuropathy involving cranial nerves --> 0 = No  Lupus headache --> 0 = No  New onset stroke --> 0 = No  Vasculitis --> 0 = No  Arthritis --> 0 = No  Myositis --> 0 = No  Heme-granular or RBC urinary casts --> 0 = No  Hematuria --> 0 = No  Proteinuria --> 0 = No  Pyuria --> 0 = No  Inflammatory-type rash --> 0 = No  Alopecia --> 0 = No  Oral or nasal mucosal ulcers --> 0 = No  Pleuritic chest pain with pleural rub/effusion or pleural thickening --> 0 = No  Pericarditis --> 0 = No  Low complement --> 0 = No  High DNA binding --> 2 = Yes  Temp >100.4 °F (38°C) --> 0 = No  Platelets 9/L  --> 0 = No  WBC 9/L --> 0 = No      HPI from initial consultation  referred for rheumatologic evaluation due to second opinion regarding her lupus.     In 2018, she first started experiencing some symptoms.   Had seen a new PCP- and had blood testing which showed some leukopenia and anemia but since pt  wasn't symptomatic, nothing done initially.  Had some chest pain and initially thought related to heavy exercise. Ignored symptoms.  Had a rash that started over her abdomen that spread throughout the body and sparing the face. Had self-medicated with some antibacterial soap and herbal remedies, took about 2-3 months to resolve. There was some minimal scarring.  Then developed some pleurisy- chest pain with associated shortness of breath. Had elevated ddimer- always negative for blood clots- had multiple CT and US. States 4th time that year, further testing was done.  Her brother was in medical school at that time and suggested further testing for lupus. Repeat testing was positive.   Had some facial swelling and throat swelling and left arm pain with lymph node enlargement by the breast.  Would get lymph nodes in the neck and pain with swallowing.   Chest pain was so severe, she could not walk up the steps  In 2019, was evaluated in ER and had ECHO and further testing which had established diagnosed with pericarditis.   Had episode of fainting prior to admission.   Started on plaquenil immediately. Started on IV steroids and eventually tapered down to 10mg of prednisone. Has not attempted to decrease further yet. Has been on 10mg of prednisone for several months.  Was started on Cellcept during the hospitalization. Was on medication until September of 2021 due to insurance coverage.   Has been on colchicine since 2020 due to persistent chest pain without the shortness of breath. Felt like she could not work out without some sort of compression. ECHOs and CT scans have shown improvement.   Colchicine has helped.   Recently had Benlysta added to regimen last month since cellcept not approved.   Is doing self-injection with the Benlysta but only did one dose so far.     Denies significant joint pain except the bottom of the feet/ankles.   States the week prior to her menstrual cycle, she has significant pain in  shoulders and bottom of feet, fatigued as well as swelling in her fingers. Cannot wear rings around her cycle due to the swelling.     Was seeing pulmonology- Dr. Perera and has not followed up due to the pandemic     + feel some hair thinning at the corners of her scalp; is also coming out in clumps.   + fatigue with sun exposure   Recent blood counts have been normal   Denies difficulty swallowing now or sore throat but can still get some discomfort with \"flares\" which resolves with gargling with apple cider vinegar and salt.   + diarrhea which she attributes to colchicine   + achilles tendon pain  + dry eyes, not severe and occasional per pt. Attributes to working on computer all day. Does not think severe enough to need eye drop  + neck and upper/mid back pain since hospitalization. Can radiate to the shoulder but typically has the chest pain at the same time.     The patient denies recurrent oral or nasal ulcers, photosensitive rash, elevated or scarring rashes, Raynaud's phenomenon, prior renal or liver disease, or history of seizures.  No history of prior blood clot in the legs or lungs, strokes or ischemic phenomenon. Never pregnant.   Denies nonhealing ulcers on the fingertips.  The patient denies any history of uveitis, crampy abdominal pain, constipation, diarrhea, bloody stools, nodular painful shin bruises, psoriatic lesions,or  spooning or pitting of the nails.  There are no symptoms of severe dry mouth or recurrent cavities.  No fevers, chills, night sweats, unexpected weight loss, easy bruising or bleeding.  Denies chronic sinus infections/disease or epistaxis.  Denies chronic cough or hemoptysis.     Family hx:   No known family hx of lupus or other autoimmune disease.       Past Medical History:  Past Medical History:   Diagnosis Date    Abdominal pain Last year    Allergic rhinitis     Anemia     Anxiety 2021    Back pain     Belching     Bloating Years    Blood in the stool     Change in hair      Chest pain     Chest pain on exertion     Constipation     Diarrhea, unspecified     Easy bruising     Esophageal reflux     Fatigue     Flatulence/gas pain/belching Years    Food intolerance     Frequent use of laxatives Years    Headache disorder     Heartburn Last year    Indigestion Last year    Irregular bowel habits     Lupus (systemic lupus erythematosus) (HCC)     Menses painful     Pain in joints     Pleural effusion     Problems with swallowing     Not often only when drinking large amounts of water worh medication    Shortness of breath     Sleep disturbance     Vomiting     Extreme cases    Wears glasses     Weight gain      Past Surgical History:  Past Surgical History:   Procedure Laterality Date    COLONOSCOPY       Family History:  Family History   Problem Relation Age of Onset    Diabetes Mother     Asthma Mother     Allergies Mother         fam hx     Social History:  Social History     Socioeconomic History    Marital status: Single   Tobacco Use    Smoking status: Never    Smokeless tobacco: Never   Vaping Use    Vaping Use: Never used   Substance and Sexual Activity    Alcohol use: Not Currently     Alcohol/week: 1.0 standard drink of alcohol     Types: 1 Glasses of wine per week     Comment: social    Drug use: No    Sexual activity: Never   Other Topics Concern    Caffeine Concern No    Weight Concern Yes    Exercise Yes     Medications:  Outpatient Medications Marked as Taking for the 1/26/24 encounter (Office Visit) with Germaine Ross DO   Medication Sig Dispense Refill    ergocalciferol 1.25 MG (02814 UT) Oral Cap Take 1 capsule (50,000 Units total) by mouth every 7 days.      Mycophenolate Mofetil 500 MG Oral Tab Take 1 tablet (500 mg total) by mouth 2 (two) times daily. 2 tablets twice daily      hydroxychloroquine 200 MG Oral Tab TAKE 1 TABLET BY MOUTH EVERY MORNING AND TAKE 1 TABLET BY MOUTH AT BEDTIME 180 tablet 0    predniSONE 5 MG Oral Tab Take 1 tablet (5 mg total) by mouth daily.  90 tablet 0    Ferrous Sulfate 325 (65 Fe) MG Oral Tab Take 1 tablet (325 mg total) by mouth daily with breakfast. 90 tablet 0     Modified Medications    Modified Medication Previous Medication    HYDROXYCHLOROQUINE 200 MG ORAL TAB hydroxychloroquine 200 MG Oral Tab       TAKE 1 TABLET BY MOUTH EVERY MORNING AND TAKE 1 TABLET BY MOUTH AT BEDTIME    TAKE 1 TABLET BY MOUTH EVERY MORNING AND TAKE 1 TABLET BY MOUTH AT BEDTIME    PREDNISONE 5 MG ORAL TAB predniSONE 5 MG Oral Tab       Take 1 tablet (5 mg total) by mouth daily.    Take 1 tablet (5 mg total) by mouth daily.     Medications Discontinued During This Encounter   Medication Reason    albuterol 108 (90 Base) MCG/ACT Inhalation Aero Soln     BENLYSTA 200 MG/ML Subcutaneous Solution Auto-injector     famotidine (PEPCID) 20 MG Oral Tab     gabapentin 100 MG Oral Cap     hydrocortisone 2.5 % External Cream     pantoprazole 40 MG Oral Tab EC     hydroxychloroquine 200 MG Oral Tab     predniSONE 5 MG Oral Tab      ?  ?  Allergies:  No Known Allergies  ?  REVIEW OF SYSTEMS   ?  Review of Systems   Constitutional:  Positive for malaise/fatigue. Negative for chills and fever.   HENT:  Negative for congestion, hearing loss, nosebleeds and tinnitus.    Eyes:  Negative for blurred vision, pain and redness.   Respiratory:  Positive for shortness of breath (occasionally). Negative for cough and hemoptysis.    Cardiovascular:  Positive for chest pain. Negative for palpitations and leg swelling.   Gastrointestinal:  Positive for constipation (relates to iron). Negative for abdominal pain, blood in stool, diarrhea, heartburn and nausea.   Genitourinary:  Negative for dysuria, frequency, hematuria and urgency.   Musculoskeletal:  Positive for back pain, joint pain and neck pain. Negative for myalgias.        Typically around menstrual cycle   Skin:  Negative for itching and rash.   Neurological:  Negative for dizziness, tingling, focal weakness, seizures, weakness (generalized)  and headaches.   Endo/Heme/Allergies:  Does not bruise/bleed easily.        + easy scarring   Psychiatric/Behavioral:  Negative for depression. The patient is not nervous/anxious and does not have insomnia.      PHYSICAL EXAM   Today's Vitals:  Temperature Blood Pressure Heart Rate Resp Rate SpO2   Temp: 99.3 °F (37.4 °C) BP: 98/62 Pulse: 79 Resp: 16 SpO2: 97 %   ?  Current Weight Height BMI BSA Pain   Wt Readings from Last 1 Encounters:   01/26/24 171 lb 9.6 oz (77.8 kg)    Height: 5' 3\" (160 cm) Body mass index is 30.4 kg/m². Body surface area is 1.81 meters squared.         Physical Exam  Vitals and nursing note reviewed.   Constitutional:       General: She is not in acute distress.     Appearance: Normal appearance. She is well-developed. She is not diaphoretic.   HENT:      Head: Normocephalic.   Eyes:      General: No scleral icterus.     Extraocular Movements: Extraocular movements intact.      Conjunctiva/sclera: Conjunctivae normal.   Neck:      Vascular: No JVD.      Trachea: No tracheal deviation.   Cardiovascular:      Rate and Rhythm: Normal rate and regular rhythm.      Heart sounds: Normal heart sounds. No murmur heard.     No friction rub. No gallop.   Pulmonary:      Effort: Pulmonary effort is normal. No respiratory distress.      Breath sounds: Normal breath sounds. No wheezing.   Musculoskeletal:         General: Deformity present. No swelling or tenderness.      Cervical back: Neck supple.      Comments: No evidence of heberden or andrew nodes of any of the fingers, no basilar joint tenderness of the 1st CMC bilaterally.  No swelling, tenderness, redness or restriction of motion of the DIPs, PIPs, MCPs, wrists, elbows, ankles, or joints of the feet.  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  Bilateral knees without medial joint line tenderness, no crepitus, no effusion.   Lymphadenopathy:      Cervical: No cervical  adenopathy.   Skin:     General: Skin is warm and dry.      Findings: No erythema or rash.      Comments: No periungal erythema  No malar rash  Nails manicured    Neurological:      Mental Status: She is alert and oriented to person, place, and time.      Cranial Nerves: No cranial nerve deficit.      Gait: Gait normal.   Psychiatric:         Mood and Affect: Mood normal.         Behavior: Behavior normal.       ?  Radiology review:         DATE OF SERVICE: 07.15.2020     CT CHEST(CONTRAST ONLY) (CPT=71260)   CLINICAL INDICATION: Localized enlarged lymph nodes.   COMPARISON STUDY: [facility] 9/16/2019.   TECHNIQUE: Helical images of the chest were obtained from the thoracic inlet through the adrenal   glands. The data was reconstructed into 1.25 mm collimated axial images.   CONTRAST: 80 mL Isovue 370   Automated exposure control and ALARA manual techniques for patient specific dose reduction were   followed while maintaining the necessary diagnostic image quality.   ADVERSE REACTION: None.   FINDINGS:     HEART/AORTA: Heart size is normal. There is marked improvement in previously noted pericardial   effusion. Pericardial fluid now measures approximately 4 mm in thickness anterior to the right   ventricle. Previously it measured up to 2.4 cm adjacent to the left ventricle. The thoracic aorta is   normal in caliber.   MEDIASTINUM/BETZY: There is interval decrease in size of mediastinal and hilar lymph nodes since the   prior study. Prevascular lymph node now measures 6 mm compared to 1 cm on the prior study. Right   hilar lymph node measures 8 mm compared to 1.5 cm on the prior study.   LUNGS AND AIRWAYS: There is interval resolution of consolidation/atelectasis involving the left lung   lingula and lower lobe. There are no new areas of consolidation in the lungs. There is a 2 mm nodule   in the right lung upper lobe on image 76.   PLEURA: There is no pleural effusion on the current study.   CHEST  WALL, AXILLA, AND LOWER NECK: Mildly prominent bilateral axillary lymph nodes with fatty kang   have slightly decreased in size. Left axillary lymph node measures 1.7 x 1.1 cm compared to 2.3 x   1.6 cm on the prior exam.   UPPER ABDOMEN: Unremarkable   OSSEOUS STRUCTURES: There are no aggressive osseous lesions.   Impression   IMPRESSION:   1. Interval improvement in pericardial effusion and left pleural effusion   2. Interval resolution of consolidation/atelectasis involving the left lung lingula and lower lobe   3. Interval decrease in size of mediastinal hilar and axillary lymph nodes   4. A 2 mm nodule in the right lung upper lobe. Per the Fleischner Society Guidelines (Radiology   2017), for patients at low risk for lung cancer no further imaging follow-up is suggested. For   patients at higher risk, such as smokers, a follow-up chest CT in 12 months is suggested. In the   setting of known malignancy or an immunocompromise patient, follow-up chest CT in 3-6 months is   suggested.     PROCEDURE:  CT CHEST+ABDOMEN+PELVIS(ALL CNTRST ONLY)(CPT=71260/60973)       COMPARISON:  [facility] CT ANGIOGRAPHY, CHEST (CPT=71275), 12/20/2018, 21:04.  [facility] CT ANGIOGRAPHY, CHEST (CPT=71275), 5/24/2019, 21:25.       INDICATIONS:  M79.10 Myalgia, unspecified site       TECHNIQUE:  IV contrast-enhanced scanning through the chest, abdomen, and pelvis was performed.  Dose reduction techniques were used. Dose information is transmitted to the ACR (American College of Radiology) NRDR (National Radiology Data Registry) which    includes the Dose Index Registry.       PATIENT STATED HISTORY:(As transcribed by Technologist)  Patient states she had pleurisy in October with swollen lymphnodes in upper axillary chest. Patient now has pain to left chest with a deep breath.        CONTRAST USED:  89cc of Omnipaque 350       FINDINGS:         CHEST:         LUNGS/PLEURA:  No active or acute process seen.  No pulmonary mass or nodule.   Minimal dependent atelectasis left lower lobe.  No pleural effusion.       THORACIC AORTA:  No aneurysm or other acute process       MEDIASTINUM/BETZY:  Mild enlarged hilar lymph nodes including on the right on image 35 a 13 mm lymph node transverse dimension, stable as remeasured by myself.       CARDIAC:  Unremarkable.       CHEST WALL:  Unremarkable.       OTHER:  Enlarged bilateral axillary lymph nodes.  Showing no change when compared with a very recent CT exam of the chest from 5/24/2019, the largest lymph node as remeasured myself, stable in size between the current on the prior exam, 27 x 18 mm series    2, image 22 and 23 current exam, series 4, image 64 prior exam.  Additional bilateral enlarged lymph nodes are also stable.       ABDOMEN/PELVIS:       LIVER:  Unremarkable.       BILIARY:  Unremarkable.       PANCREAS:  Unremarkable.       SPLEEN:  No splenomegaly.       KIDNEYS:  No acute abnormality.       ADRENALS:  Unremarkable.       AORTA/VASCULAR:  No aortic aneurysm.       RETROPERITONEUM:  Numerous retroperitoneal lymph nodes are seen, most of which are sub cm, with 1 on series 2, image 156 just to the left of the left common iliac artery measuring 11 x 11 mm.  Another lymph node to the right of the iliac bifurcation on   image 173 measures 13 x 12 mm.       BOWEL/MESENTERY:  No acute process.       ABDOMINAL WALL:  Unremarkable.       URINARY BLADDER:  Unremarkable.       PELVIC NODES:  Pelvic sidewall enlarged lymph nodes including on the right image 191 measuring 14 x 24 mm and on the left measuring 11 x 19 mm image 194.       PELVIC ORGANS:  Heterogeneous appearance of the uterus, with a heterogeneous mass outside of the endometrial cavity, distorting the endometrial cavity measuring 6.6 cm, most typical for uterine fibroid.  Left adnexal cyst likely of ovarian origin maximum    dimension 3.2 cm, and medial posterior right adnexal cystic mass also likely of ovarian origin 2.1 cm.  Trace free  pelvic fluid.         OTHER:  Scattered mildly enlarged inguinal lymph nodes..       SKELETAL STRUCTURES IN THE CHEST/ABDOMEN/PELVIS:       BONES:  No acute abnormality.           Impression   CONCLUSION:         Enlarged lymph nodes in the chest, abdomen, pelvis including axilla, to a lesser extent right hilum, with mild enlarged lymph nodes in the lower retroperitoneum along the iliac arteries, subcentimeter retroperitoneal lymph nodes, and then enlarged pelvic    sidewall lymph nodes and mildly enlarged inguinal lymph nodes.  The spleen does not appear enlarged.  No pleural effusion, ascites or pneumonia.  These lymph nodes are indeterminate, could reflect inflammatory etiology, with lymphoma also considered.     Consider biopsy of accessible lymph nodes.       Uterine fibroid.  Bilateral adnexal cystic lesions are most likely ovarian cyst.  Consider obtaining pelvic ultrasound to better assess these pelvic abnormalities.           Dictated by: Roosevelt Saldivar MD on 6/08/2019 at 14:27       Approved by: Roosevelt Saldivar MD            Labs:  Lab Results   Component Value Date    WBC 2.9 (L) 01/20/2024    RBC 4.40 01/20/2024    HGB 11.9 (L) 01/20/2024    HCT 37.4 01/20/2024    .0 01/20/2024    MCV 85.0 01/20/2024    MCH 27.0 01/20/2024    MCHC 31.8 01/20/2024    RDW 14.2 01/20/2024    NEPRELIM 1.42 (L) 01/20/2024    NEPERCENT 48.6 01/20/2024    LYPERCENT 37.9 01/20/2024    MOPERCENT 11.9 01/20/2024    EOPERCENT 1.0 01/20/2024    BAPERCENT 0.3 01/20/2024    NE 1.42 (L) 01/20/2024    LYMABS 1.11 01/20/2024    MOABSO 0.35 01/20/2024    EOABSO 0.03 01/20/2024    BAABSO 0.01 01/20/2024     Lab Results   Component Value Date    GLU 83 10/10/2023    BUN 9 10/10/2023    BUNCREA 10.0 06/12/2021    CREATSERUM 0.85 10/10/2023    ANIONGAP 5 10/10/2023    GFRNAA 85 07/29/2022    GFRAA 98 07/29/2022    CA 9.0 10/10/2023    OSMOCALC 280 10/10/2023    ALKPHO 60 10/10/2023    AST 11 (L) 10/10/2023    ALT 17 10/10/2023    BILT  0.3 10/10/2023    TP 8.4 (H) 10/10/2023    ALB 4.0 10/10/2023    GLOBULIN 4.4 10/10/2023     10/10/2023    K 4.0 10/10/2023     10/10/2023    CO2 24.0 10/10/2023     2D ECHO 01/2020  Conclusions:     1. Left ventricle: The cavity size was normal. Systolic function was normal.      The estimated ejection fraction was 55%.   2. Pericardium, extracardiac: A trivial to small pericardial effusion was      identified. There was no evidence of hemodynamic compromise. Pericardial      effusion size appears to be less compared to previous echocardiogram.     Impressions:  Compared to the previous study, these findings represent   improvement.     Additional Labs:  01/2024  CBC with WBC 2.9; Hgb 11.9; plt 288; MCV 85  IgA 404 elevated  IgG 1770 elevated  IgM 87.1  ESR 49 elevated  CRP 1.03 elevated  Ferritin 36.1  dsDNA pending     10/2023  CBC with WBC 4.1; hemoglobin 12.5; platelet 332; MCV 85.5  CMP grossly normal  Iron 51; percent sat 14 low  IgA 350 elevated  IgG 1530 normal  IgM 64.2 normal  Urine protein to creatinine ratio normal  ESR 43 elevated  CRP 1.0 elevated  C3 97.2 normal  C4 25.3 normal  B12 1111 elevated  Ferritin 34.9 normal  Vitamin D 24.8 Low   normal  Homocysteine 8.4 normal  dsDNA 1: 1280 high positive    07/2023  Ferritin normal  Iron 24; percent sat 7 low  Urine protein to creatinine ratio 0.11  UA grossly negative  dsDNA 1:640  C4 25.8 normal  C3 107 normal  ESR 35 borderline elevated  CRP 1.32 elevated  CMP grossly normal  CBC with WBC 4.1; hemoglobin 11.4; MCV 83.6; platelet 318    04/2023  GURDEEP screen positive  KAILEE panel with SSA >240  dsDNA 80 positive   Ferritin 12.1 borderline low  Iron 43; percent sat 12 both low  Vitamin D 49.9 normal  ESR 29 borderline elevated  CRP 0.39 borderline elevated  CMP grossly normal  CBC with WBC 3.4; hemoglobin 11.5; platelet 340    01/2023  SSA >240.0 positive  SSB, Brown, Nuha 1 RNP, RNP 70, centromere, SCL 70, Nuha 1 negative  Vitamin D 22.6  low  Urine protein to creatinine ratio 0.12 normal  UA with trace protein otherwise grossly negative  dsDNA IgG 117 positive  ESR 34 borderline elevated  CRP 0.80 borderline elevated  CMP grossly normal  CBC with WBC 4.0; Hgb 11.3; plt 308    10/2022  UA protein 30; small leuk esterase and few squamous cells otherwise negative  dsDNA 1:320  C4 24.8 normal  C3 106 normal  Vitamin D 25.6 low  ESR 31 elevated  CRP 0.33 borderline  CMP grossly normal  CBC with WBC 4.4; hemoglobin 11.9; platelet 375  Urine protein to creatinine ratio 0.14    07/2022  Ferritin 13.2 borderline low  Iron and percent saturation low  C4 24.8 normal  C3 111 normal  ESR 18 normal  CRP 0.68 borderline elevated  CMP grossly normal  CBC with WBC 3.6; hemoglobin 11.2; platelet 372  Antiphospholipid antibodies pending  dsDNA 1: 640    3/2022  Vit D 37.5  Iron and % sat low   Ferritin 14 normal   CBC with WBC 5.1 ; hemoglobin 11.5; MCV 83.6; platelet 369  CMP grossly normal  ESR 20 normal   CRP normal   DsDNA 1:1280    01/2022  CBC with WBC 7.4; hemoglobin 11.9; MCV 84.7; platelet 367  CMP grossly normal  CRP 1.04 elevated  ESR 25 borderline  dsDNA 1: 2560  C4 20.1 normal  C3 105 normal  Vitamin D 16.1 low  LAC negative  ACL negative  B2G IgM 9.7 equivocal; IgG negative  Urine protein creatinine ratio 0.16  UA negative for protein or blood (small leuk esterase)    06/2021  UA grossly normal  Smith negative  ESR 20  C4 18.6  C3 103  dsDNA 276  GURDEEP screen positive no titer  CMP grossly normal  CBC grossly normal    08/2021   QuantiFERON TB negative    04/2021  Vitamin D 28.4 low    09/2019   RIMMA virus negative  Smooth muscle antibody negative  Aldolase normal  Serum ACE normal  Lupus anticoagulant positive    QuantiFERON-TB indeterminant  Ferritin 235 elevated  Iron normal    Germaine Ross, DO  [facility]  01/26/2024

## 2024-01-26 NOTE — PROGRESS NOTES
[facility] Pulmonary Follow Up Note    History of Present Illness:  Eleanor Martinez is a 28 year old female who presents today for follow up of shortness of breath.  They were last seen on 12/12.    Since last visit, breathing is about the same.  Trying to do her best to exercise and is starting to get limited from time to time.  Notices that her breathing/symptoms tend to increase with exercise.  Currently on plauqenil, cell cept, and pred 5.      Past Medical History:   Past Medical History:   Diagnosis Date    Abdominal pain Last year    Allergic rhinitis     Anemia     Anxiety 2021    Back pain     Belching     Bloating Years    Blood in the stool     Change in hair     Chest pain     Chest pain on exertion     Constipation     Diarrhea, unspecified     Easy bruising     Esophageal reflux     Fatigue     Flatulence/gas pain/belching Years    Food intolerance     Frequent use of laxatives Years    Headache disorder     Heartburn Last year    Indigestion Last year    Irregular bowel habits     Lupus (systemic lupus erythematosus) (HCC)     Menses painful     Pain in joints     Pleural effusion     Problems with swallowing     Not often only when drinking large amounts of water worh medication    Shortness of breath     Sleep disturbance     Vomiting     Extreme cases    Wears glasses     Weight gain         Past Surgical History:   Past Surgical History:   Procedure Laterality Date    COLONOSCOPY         Family Medical History:   Family History   Problem Relation Age of Onset    Diabetes Mother     Asthma Mother     Allergies Mother         fam hx        Social History:   Social History     Socioeconomic History    Marital status: Single     Spouse name: Not on file    Number of children: Not on file    Years of education: Not on file    Highest education level: Not on file   Occupational History    Not on file   Tobacco Use    Smoking status: Never    Smokeless tobacco: Never   Vaping Use    Vaping Use: Never used    Substance and Sexual Activity    Alcohol use: Not Currently     Alcohol/week: 1.0 standard drink of alcohol     Types: 1 Glasses of wine per week     Comment: social    Drug use: No    Sexual activity: Never   Other Topics Concern     Service Not Asked    Blood Transfusions Not Asked    Caffeine Concern No    Occupational Exposure Not Asked    Hobby Hazards Not Asked    Sleep Concern Not Asked    Stress Concern Not Asked    Weight Concern Yes    Special Diet Not Asked    Back Care Not Asked    Exercise Yes    Bike Helmet Not Asked    Seat Belt Not Asked    Self-Exams Not Asked   Social History Narrative    Not on file     Social Determinants of Health     Financial Resource Strain: Not on file   Food Insecurity: Not on file   Transportation Needs: Not on file   Physical Activity: Not on file   Stress: Not on file   Social Connections: Not on file   Housing Stability: Not on file        Medications:   Current Outpatient Medications   Medication Sig Dispense Refill    Ferrous Sulfate 325 (65 Fe) MG Oral Tab Take 1 tablet (325 mg total) by mouth daily with breakfast. 90 tablet 0    ergocalciferol 1.25 MG (03085 UT) Oral Cap Take 1 capsule (50,000 Units total) by mouth every 7 days.      Mycophenolate Mofetil 500 MG Oral Tab Take 1 tablet (500 mg total) by mouth 2 (two) times daily. 2 tablets twice daily      hydroxychloroquine 200 MG Oral Tab TAKE 1 TABLET BY MOUTH EVERY MORNING AND TAKE 1 TABLET BY MOUTH AT BEDTIME 180 tablet 0    predniSONE 5 MG Oral Tab Take 1 tablet (5 mg total) by mouth daily. 90 tablet 0       Review of Systems: Review of Systems     Physical Exam:  /64 (BP Location: Right arm, Patient Position: Sitting, Cuff Size: adult)   Pulse 77   Resp 16   Ht 5' 3\" (1.6 m)   Wt 172 lb (78 kg)   LMP 05/01/2023 (Exact Date)   SpO2 98%   BMI 30.47 kg/m²      Constitutional: alert, cooperative. No acute distress.  HEENT: Head NC/AT.   Cardio: Regular rate and rhythm. Normal S1 and S2. No  murmurs.   Respiratory: Thorax symmetrical with no labored breathing. Clear to ausculation bilaterally with symmetrical breath sounds. No wheezing, rhonchi, rales, or crackles.   GI: NABS. Abd soft, non-tender.  Extremities: No clubbing or cyanosis. No BLE edema.    Neurologic: A&Ox3. No gross motor deficits.  Skin: Warm, dry  Psych: Calm, cooperative. Pleasant affect.    Results:  Personally reviewed  CARD TREADMILL - CPX, ADULT(CPT=94621)  Table formatting from the original result was not included.  PATIENT'S NAME: Eleanor Martinez  ORDERING PHYSICIAN:  Jelani Jim MD   DATE OF TEST: 12/22/23  PRIMARY CARE PHYSICIAN:  Liana Zaidi DO      MEDICAL RECORD #:   [id number]     PATIENT ACCOUNT#: [id number]  YOB: 1995  AGE AT TEST:    28 year old    CARDIOPULMONARY EXERCISE EVALUATION/CPX TREADMILL    Height (in):  63  Gender: Female   Weight (#): 178  Weight (kg): 80.9   % Body Fat: 37  Lean Body Mass (#): 112   Lean Body Mass (kg) 50.9  APMHR (90%/100%) 172/191     SPIROMETRY     Rest Value  Rest % Normal Ref.  5minPEx.  10 min P Ex.  15 minP Ex.   FVC 1.26 40 3.14 1.19 1.30 1.24   FEVl 1.13 42 2.69 1.09 1.18 1.15   FEVl% 90 104 86 91 91 93     TREADMILL PROTOCOL - Mod 80     HR BP O2 Sat % RPE Speed   MPH TM Elevation   %   Rest 72 120/78 98 NA NA NA   2 minutes  114 128/80 93 9 1.80 6.00   4 minutes 130 144/80 92 13 2.20 9.80   6 minutes 141 166/82 87 17 2.60 12.50   8 minutes         10 minutes         12 minutes         14 minutes         Peak 142 170/82 88 19 2.70 13.00   Recovery 74 130/82 98 NA NA NA   Recovery 81   NA NA NA   Total Exer. Time 6:31 PEAK METS: 4.9 % AMPHR 74% NA      ACHIEVED PATIENT PREDICTED   VO2 max 17.2 (63%) ml/kg/min 27.2 ml/kg/min (> 80%)   VO2 max calc for LBM 27.3 ml/kg/min (>19 ml/kg/min)   Mets 4.9  85   VO2 at AT  14.6 ml/kg/min (85%) (> 40% of predicted peak VO2)        RER max 1.16 (> 1.10)   Calculated O2 pulse 86% (> 80% of predicted O2  pulse)   VE/VO2 at AT 31 (<40)   VE/VCO2 at AT 37 (<30-34)   Calculated Breathing Reserve 44.5% (>30%)   O2 saturation 98% Peak Exercise: 88%     Diagnosis: Shortness of breath.  Dyspnea: After severe exertion.  Cough: No cough.  Tbco Prod: Never smoked.     Pre Test Comments: Patient presents to the test today feeling fine. She   just completed a resting PFT one day prior to the CPX. Patient has been   complaining of shortness of breath for 3 years. She states that is has   only gotten worse. She denies chest pain or dizziness with her shortness   of breath. Patient has a history of pericardial effusion, SLE, pulmonary   disease in systemic lupus erythematosus, a CTA showing cardiomegaly and a   normal TTE. Dr. Sutherland is her cardiologist. Patient states that she does   exercise 3-4 times a week (cardio and weights). Mod 80 protocol was chosen   based on the patient's symptoms, weight, health history, and current   activity/exercise level.     Post Test Comments: Test was terminated due to fatigue and SOB/AGUIRRE.   Patient complained of shortness of breath and dyspnea on exertion with   shoulder/upper body pain and tightness (assumed from poor breathing   mechanics as patient did start to hiccup/gasp/gulp for air during rest due   to her SOB/AGUIRRE). Symptoms resolved in recovery. THR was not achieved, only   74% of APMHR as this was a symptom limited CPX. Rare PACs noted during   exercise. Please note NSSTTW changes during exercise.     SPIROMETRY: Although the FEV1 and NOU90-77% are reduced, the FEV1/FVC   ratio is increased. The MVV is reduced.     Pulmonary Function Diagnosis: Mild obstructive airway disease with   possible severe restriction.     EXERCISE: Mod Ramp 80 protocol was chosen based on the patient's symptoms,   weight, health history and current activity/exercise level.  Pre Test EKG: NSR.  Exercise EKG: normal to 74% of APMHR. Target heart rate was not achieved   as this was a symptom limited  CPS.  Arrhythmias: Rare PACs noted.   Symptoms. Patient complained of SOB/AGUIRRE with shoulder/upper body pain and   tightness (assumed from poor breathing mechanics as patient did start to   hiccup/gasp/gulp for air during test due to her SOB/AGUIRRE. Symptoms resolved   in recovery.   Reason Stopped: Fatigue, SOB/AGUIRRE.    INTERPRETATION: The exercise was stopped because of fatigue, SOB/AGUIRRE, with   shoulder/upper body pain and tightness (assumed from poor breathing   mechanics as patient did start to hiccup/gasp/gulp for air during test due   to her SOB/AGUIRRE. Symptoms resolved in recovery. The exercise was maximal as   shown by an RER>=1.10 at WR max. The functional capacity was <75% of   predicted. The VO2 at anaerobic threshold was in the range of 49-59% of   the predicted VO2 max and is within the range of normal. Normal Vt/FVC   (0.50 to 0.60). The breathing reserve was normal (30-50%). The respiratory   rate at WR max was abnormally elevated (>50 breaths/minute). The maximal   tidal volume was low (<2x resting volume). The O2 pulse was low. VE/VCO2   at AT was elevated. The heart rate reserve was high (>15.0%). The ECG was   normal at rest and throughout exercise test to 74% of her APMHR. Target   heart rate was not achieved as this was a symptom limited CPS. Rare PACs   noted. Please note NSSTTW changes (T wave inversion) during exercise.     CONCLUSION: Abnormal CPET with maximal volitional effort. The reduced VO2   peak, low stroke volume and elevated ventilatory equivalents for CO2   suggest a cardiac etiology for the mild to moderate functional limitation   (Kaur class B). However, concomitant presence of pulmonary vascular   disease cannot be excluded.     fred Kennedy MD 01/16/2024    t   [id number]  1/17/2024 7:54 AM       PFTs:       No data to display                   No data to display                    WBC: 2.9, done on 1/20/2024.  HGB: 11.9, done on 1/20/2024.  PLT: 288, done on 1/20/2024.      Glucose: 83, done on 10/10/2023.  Cr: 0.85, done on 10/10/2023.  GFR(AA): 98, done on 7/29/2022.  GFR (non-AA): 85, done on 7/29/2022.  CA: 9, done on 10/10/2023.  Na: 136, done on 10/10/2023.  K: 4, done on 10/10/2023.  Cl: 107, done on 10/10/2023.  CO2: 24, done on 10/10/2023.  Last ALB was 4% done on 10/10/2023.     No results found.     Assessment/Plan:  #1. Shortness of breath in the setting of lupus, moderate restrictive ventilatory defect  Had extensive conversation with patient regarding PFT irregularities  Compared current PFTs to prior ones, seems like we are back to where we were in 2020  Between PFT readings, findings on CT (PA is bigger than aorta) and symptoms, would like cardiology to evaluate for RHC  Spoke with rheumatology will determine potential next steps of immunosuppresion  Will need to follow PFTs and imaging closely over the coming months/years  Encouraged to exercise as much as possible    Return in about 3 months (around 4/26/2024).      Jelani Jim MD  1/26/2024

## 2024-02-02 NOTE — PROCEDURES
Pulmonary Function Test:   Findings:  Spirometry: FEV1 is 1.22 L, 44% predicted. FVC is 1.38 L, 43% predicted and FEV1/ FVC ratio is 0.88.  The flow-volume loop demonstrates an obstructive pattern  Lung Volumes:                                                                     The TLC is 2.89 L, 59% predicted.  The residual volume 1.51 L, 107% predicted.  Diffusion Capacity:  The diffusion capacity is 11.86 or 53% predicted and 114% predicted when corrected for alveolar volume.     Impression:  There is moderate restrictive ventilatory defect on spirometry and visualized on flow-volume loop.    Lung volumes show moderate restriction with total lung capacity of 2.89 L, 59% predicted. This can be seen in heart failure, fluid overload states, interstitial lung disease, thoracic spine/chest disorders, obesity as well as other clinical scenarios.  Further clinical correlation required. ERV is reduced likely due to chest wall restriction with obesity      Diffusion capacity is moderately reduced  improves to normal range when considering alveolar volume. This may represent a normal finding but can be seen in emphysema, interstitial lung disease, pulmonary vascular disease (such as pulmonary hypertension), atelectasis and anemia. If not already performed, would suggest further evaluation as determined clinically.    There are no previous pulmonary function tests available for comparison.     Vikram Mariee MD

## 2024-02-20 NOTE — TELEPHONE ENCOUNTER
Called patient.  Discussed test results in detail.  Complements are lower than they were before.  CMP grossly normal and normal urine studies.  Iron is borderline low.  TPMT is still pending.  Discussed that we will make the transition from mycophenolate to azathioprine once that is resulted.  In the meantime, I recommended that she follow back with the pulmonologist and the cardiologist regarding further assessment for potential pulmonary hypertension as the pulmonologist was concerned for.    Patient verbalized understanding of above instructions. No further questions at this time.    Germaine Ross,   [facility]  2/20/2024 02/2024  CMP grossly normal  C3 78 low  C4 16.1 normal  UA grossly negative  Urine protein to creatinine ratio 0.06 normal  Iron studies low normal  TPMT pending     01/2024  dsDNA 1: 640  CBC with WBC 2.9; Hgb 11.9; plt 288; MCV 85  IgA 404 elevated  IgG 1770 elevated  IgM 87.1  ESR 49 elevated  CRP 1.03 elevated  Ferritin 36.1    Narrative   PROCEDURE:  CT ANGIOGRAPHY, CHEST (CPT=71275)     COMPARISON:  [facility], CT ANGIOGRAPHY CHEST (CPT=71275), 7/25/2023, 6:47 AM.     INDICATIONS:  R07.89 Other chest pain R06.02 Shortness of breath M32.12 Systemic lupus erythematosus (SLE) with pericarditis, unspecified SLE type (HCC)     TECHNIQUE:  IV contrast-enhanced multislice CT angiography is performed through the pulmonary arterial anatomy. 3D volume renderings are generated.  Dose reduction techniques were used. Dose information is transmitted to the ACR (American College of  Radiology) NRDR (National Radiology Data Registry) which includes the Dose Index Registry.     PATIENT STATED HISTORY:(As transcribed by Technologist)  Patient states to have ongoing intermittent shortness of breath and chest tightness. She has a history of SLE with pericarditis.      CONTRAST USED:  105cc of Isovue 370     FINDINGS:    VASCULATURE:  No visible pulmonary arterial thrombus or  attenuation.    THORACIC AORTA:  No aneurysm or visible dissection.    LUNGS:  No focal consolidation.  No pneumothorax.  The central airways are patent.  MEDIASTINUM:  There is a mildly prominent 9 mm prevascular node (series 5, image 72), which is unchanged from prior.  No new or enlarging mediastinal lymphadenopathy.  BETZY:  No mass or adenopathy.    CARDIAC:  There is stable mild cardiomegaly.  There is a small pericardial effusion.  PLEURA:  No mass or effusion.    CHEST WALL:  No mass or axillary adenopathy.    LIMITED ABDOMEN:  No acute findings.  BONES:  No bony lesion or fracture.    OTHER:  Negative.           Impression   CONCLUSION:    1. Small pericardial effusion and mild cardiomegaly, similar to prior.  2. Otherwise no acute findings or significant interval change.  See body of the report for further details.        LOCATION:  [facility]        Dictated by (CST): Rickey Blanco MD on 2/16/2024 at 2:46 PM      Finalized by (CST): Rickey Blanco MD on 2/16/2024 at 2:51 PM

## 2024-02-22 NOTE — TELEPHONE ENCOUNTER
Called pt, notified of Dr. 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  This can put her at risk of a blood count abnormalities with the azathioprine. we can move forward with the medication and monitor her response.  But I would like her to get updated labs within 2 weeks of starting 25 mg of azathioprine daily. if her blood counts drop, we will need to stop.  The other option is considering to increase the mycophenolate further.  Max dose would be 1500 mg twice daily. \"    Pt voices understanding and would like to try the azathioprine. Pt aware that she will need to repeat blood work in 2 wks

## 2024-04-23 NOTE — TELEPHONE ENCOUNTER
Future Appointments   Date Time Provider Department Center   4/23/2024  9:15 AM REF [facility] REF [facility] Ref [facility]   4/30/2024 12:45 PM Germaine Ross DO [facility]   7/31/2024  1:15 PM Germaine Ross DO [facility]   10/29/2024  9:00 AM Germaine Ross DO [facility]   1/31/2025  8:30 AM Germaine Ross DO [facility]

## 2024-04-30 NOTE — PROGRESS NOTES
RHEUMATOLOGY FOLLOW UP   Date of visit: 04/30/2024  ?  Chief Complaint   Patient presents with    SLE     ASSESSMENT, DISCUSSION & PLAN   Assessment:  1. Systemic lupus erythematosus (SLE) with pericarditis, unspecified SLE type (HCC)    2. High risk medication use    3. Other iron deficiency anemia    4. Antiphospholipid antibody positive    5. Long term (current) use of systemic steroids    6. Shortness of breath    7. Other chest pain    8. Ds DNA antibody positive    9. Elevated sed rate    10. Cardiomegaly    11. Pericardial effusion (HCC)    12. Vitamin D deficiency    13. Immunocompromised state due to drug therapy (HCC)    14. Other neutropenia (HCC)    15. Iron deficiency anemia due to chronic blood loss          Discussion:  Ms. Eleanor Martinez is a 30 yo woman who was otherwise healthy, until she was diagnosed with lupus in 2019. She has had pericarditis, pleuritis as well as fatigue and oral ulcers. Chart review reveals significant diffuse lymphadenopathy at time of diagnosis. She was initially placed on steroids and mycophenolate. Had colchicine added to her regimen due to persistent chest pain. While she get some shortness of breath and pains, they do not seem related to lupus. And has been told at one point by pulm likely more neuro-muscle related. Previously discussed that some of her depression/anxiety may be contributing to some of her symptoms.     At this time, she has been doing well overall. Still with some joint pain which is manageable. Her CTA of the chest showed normal lungs but some cardiomegaly and small pericardial effusion which per the report are stable.. Spoke to pulm and after his personal review of CTA, there is concern for pulm artery enlargement. She is following with both pulmonology and cardiology regarding this. Has to get right heart cath to be sure.  Given the above, decision was made to change MMF to AZA. She has been tolerating very low dose of 25mg daily without  obvious side effects. Thinks she may be feeling better overall.  She had a slight worsen in WBC and hemoglobin but she also has some decreased iron levels.  Will have her repeat labs in about 2-4 weeks (try to avoid getting done during her menstrual cycle) before we consider increasing to 50mg daily.  She will continue the hydroxychloroquine but due to weight loss, will adjust dosage- she will take BID 5 days of the week and 2 days of the week, take one pill.  Encouraged her to take iron and vitamin d daily  Continue prednisone 5mg daily.   Stay off colchicine and benlysta for now  Continue pantoprazole given by GI regularly   Remember yearly eye exams while on plaquenil   Follow up in 3 months or sooner as needed  Call with questions/concers in the meantime    Previously discussed the risks and benefits of the use of azathioprine at length, including increased risk of infection and malignancy. Azathioprine can cause leukopenia and change blood counts, as well as cause a change in the liver enzymes. Initially, azathioprine can result in upset stomach as well. Eleanor Martinez understands this risk, and agrees that the benefits outweigh this risk for her systemic inflammatory disease.    Patient verbalized understanding of above instructions. No further questions at this time.    Code selection for this visit was based on time spent (40min) on date of service in preparing to see the patient, obtaining and/or reviewing separately obtained history, performing a medically appropriate examination, counseling and educating the patient/family/caregiver, ordering medications or testing, referring and communicating with other healthcare providers, documenting clinical information in the E HR, independently interpreting results and communicating results to the patient/family/caregiver and care coordination with the patient's other providers.    Discussed with pulmonology.        Plan:  Diagnoses and all orders for this  visit:    Systemic lupus erythematosus (SLE) with pericarditis, unspecified SLE type (HCC)  -     CBC With Differential With Platelet; Future  -     Comp Metabolic Panel (14); Future  -     hydroxychloroquine 200 MG Oral Tab; Take 1 tablet (200 mg total) by mouth 2 (two) times daily.  -     predniSONE 5 MG Oral Tab; Take 1 tablet (5 mg total) by mouth daily.    High risk medication use  -     CBC With Differential With Platelet; Future  -     Comp Metabolic Panel (14); Future    Other iron deficiency anemia    Antiphospholipid antibody positive    Long term (current) use of systemic steroids    Shortness of breath    Other chest pain    Ds DNA antibody positive    Elevated sed rate    Cardiomegaly    Pericardial effusion (HCC)    Vitamin D deficiency    Immunocompromised state due to drug therapy (HCC)  -     CBC With Differential With Platelet; Future  -     Comp Metabolic Panel (14); Future    Other neutropenia (HCC)  -     CBC With Differential With Platelet; Future  -     Comp Metabolic Panel (14); Future    Iron deficiency anemia due to chronic blood loss  -     Ferrous Sulfate 325 (65 Fe) MG Oral Tab; Take 1 tablet (325 mg total) by mouth daily with breakfast.            Return in about 3 months (around 7/30/2024).  ?  HPI   Eleanor Martinez is a 29 year old female with the following active problems who recently transitioned care to me. She has a history of lupus (GURDEEP, dsDNA+) with joint pain, fatigue, pericarditis and pleuritis- on cellcept, benlysta, prednisone and colchicine. She presents for follow up.     Since her last visit, she has been doing okay.   Was able to stop the MMF and start low dose azathioprine. Has not noticed a difference with the change in medication     Followed with pulmonology and cardiology. Has plans to be evaluated for pulmonary hypertension vs pericardial effusion. Has to schedule right heart cath.     Still having some chest discomfort but thinks less than before. Still has  some right side chest discomfort.   Has been exercising regularly and trying to lose weight (got done to 165#)   Stops when she feels fatigue/short of breath but is able to do at least 30 minutes - feels about the same. Still feels worsened depending on how the rest of her body feels as well as HR.     The prior feet swelling improved with the activity. Still aggravated by certain foods but better overall. Worsened recently with traveling.       Denies current joint pain in the knees/elbows like before.  Can feel over the PIPs with some swelling as well as the feet. Feels certain foods trigger her pain- like chinese food/fried rice, ETOH, garlic.       Denies skin rashes     + fatigue still there but better overall. Lower energy this week, is on her menstrual cycle. Still dealing with heavier cycles. Has been taking iron daily. Denies improvement of energy but has noticed improvement of hair loss.     Denies cough or hemoptysis   Stopped pantoprazole in the mornings and adjusted diet. Still gets reflux intermittently but more controllable.   But does taking magnesium, vit c, protein powder.     Cardiology- Dr. Sutherland. ECHO completed, normal per pt. Told no pulm hypertension  Pulmonology - Dr. Gonzalez - concerned for pulm HTN given size of pulm artery on last CTA. Per pt, does not think as related to neuromuscular weakness like before. All levels on recent PFT were low but SNIF relatively normal.     + dry eyes when wearing contacts, has recommended OTC drops but only uses as needed (refresh) continues to use daily contacts. Pt states she does not notice symptoms daily.   Denies dry mouth  Denies recent oral or nasal ulcers. Had 1-2 that resolved quickly   Denies skin rashes or photosensitivity.     Currently taking:  Plaquenil 200mg BID- last eye exam in December ([facility] [place]- new provider; told everything normal)  Azathioprine 25mg daily   Prednisone 5mg daily  Colchicine not needed since her last visit.      Last Benlysta was in the summer 2023    Systemic Lupus Erythematosus Disease Activity Index 2000 (SLEDAI-2K)  Stratifies severity of systemic lupus erythematosus (SLE).    When to Use  Recent onset seizure  Exclude metabolic, infectious, or drug causes    No0    Yes+8  Psychosis  Altered ability to function in normal activity due to severe disturbance in the perception of reality (include hallucinations, incoherence, marked loose associations, impoverished thought content, marked illogical thinking, and bizarre, disorganized, or catatonic behavior); exclude uremia and drug causes    No0    Yes+8  Organic brain syndrome  Altered mental function with impaired orientation, memory, or other intellectual function (with rapid onset and fluctuating clinical features), inability to sustain attention to environment, and ?2 of the following: perceptual disturbance, incoherent speech, insomnia or daytime drowsiness, and increased or decreased psychomotor activity; exclude metabolic, infectious, or drug causes    No0    Yes+8  Visual disturbance  Retinal changes of SLE (include cytoid bodies, retinal hemorrhages, serous exudates or hemorrhages in choroid, and optic neuritis); exclude hypertensive, infectious, or drug causes    No0    Yes+8  New onset sensory or motor neuropathy involving cranial nerves    No0    Yes+8  Lupus headache  Severe, persistent headache (may be migrainous but must be nonresponsive to narcotic analgesia)    No0    Yes+8  New onset stroke  Exclude arteriosclerosis    No0    Yes+8  Vasculitis  Ulceration, gangrene, tender finger nodules, periungual infarction, splinter hemorrhages or biopsy, and angiogram proof of vasculitis    No0    Yes+8  Arthritis  ?2 joints with pain and signs of inflammation (i.e., tenderness, swelling, or effusion)    No0    Yes+4  Myositis  Proximal muscle aching/weakness associated with elevated CPK/aldolase, EMG changes, or a biopsy showing  myositis    No0    Yes+4  Heme-granular or RBC urinary casts    No0    Yes+4  Hematuria  >5 RBC/high-power field; exclude stone, infection, or other cause    No0    Yes+4  Proteinuria  >0.5 g/24 hours    No0    Yes+4  Pyuria  >5 WBC/high-power field; exclude infection    No0    Yes+4  Inflammatory-type rash    No0    Yes+2  Alopecia    No0    Yes+2  Oral or nasal mucosal ulcers    No0    Yes+2  Pleuritic chest pain with pleural rub/effusion or pleural thickening    No0    Yes+2  Pericarditis  Pericardial pain with ?1 of the following: rub, effusion, or EKG/echocardiogram confirmation    No0    Yes+2  Low complement  CH50, C3, or C4 decreased below lower limit of normal for lab    No0    Yes+2  High DNA binding  Increased above normal range for lab    No0    Yes+2  Temp >100.4 °F (38°C)  Exclude infectious causes    No0    Yes+1  Platelets <100 x 109/L  Exclude drug causes    No0    Yes+1  WBC <3 x 109/L  Exclude drug causes    No0    Yes+1  7 points        HPI from initial consultation  referred for rheumatologic evaluation due to second opinion regarding her lupus.     In 2018, she first started experiencing some symptoms.   Had seen a new PCP- and had blood testing which showed some leukopenia and anemia but since pt wasn't symptomatic, nothing done initially.  Had some chest pain and initially thought related to heavy exercise. Ignored symptoms.  Had a rash that started over her abdomen that spread throughout the body and sparing the face. Had self-medicated with some antibacterial soap and herbal remedies, took about 2-3 months to resolve. There was some minimal scarring.  Then developed some pleurisy- chest pain with associated shortness of breath. Had elevated ddimer- always negative for blood clots- had multiple CT and US. States 4th time that year, further testing was done.  Her brother was in medical school at that time and suggested further testing for lupus. Repeat testing was positive.   Had some facial  swelling and throat swelling and left arm pain with lymph node enlargement by the breast.  Would get lymph nodes in the neck and pain with swallowing.   Chest pain was so severe, she could not walk up the steps  In 2019, was evaluated in ER and had ECHO and further testing which had established diagnosed with pericarditis.   Had episode of fainting prior to admission.   Started on plaquenil immediately. Started on IV steroids and eventually tapered down to 10mg of prednisone. Has not attempted to decrease further yet. Has been on 10mg of prednisone for several months.  Was started on Cellcept during the hospitalization. Was on medication until September of 2021 due to insurance coverage.   Has been on colchicine since 2020 due to persistent chest pain without the shortness of breath. Felt like she could not work out without some sort of compression. ECHOs and CT scans have shown improvement.   Colchicine has helped.   Recently had Benlysta added to regimen last month since cellcept not approved.   Is doing self-injection with the Benlysta but only did one dose so far.     Denies significant joint pain except the bottom of the feet/ankles.   States the week prior to her menstrual cycle, she has significant pain in shoulders and bottom of feet, fatigued as well as swelling in her fingers. Cannot wear rings around her cycle due to the swelling.     Was seeing pulmonology- Dr. Perera and has not followed up due to the pandemic     + feel some hair thinning at the corners of her scalp; is also coming out in clumps.   + fatigue with sun exposure   Recent blood counts have been normal   Denies difficulty swallowing now or sore throat but can still get some discomfort with \"flares\" which resolves with gargling with apple cider vinegar and salt.   + diarrhea which she attributes to colchicine   + achilles tendon pain  + dry eyes, not severe and occasional per pt. Attributes to working on computer all day. Does not think  severe enough to need eye drop  + neck and upper/mid back pain since hospitalization. Can radiate to the shoulder but typically has the chest pain at the same time.     The patient denies recurrent oral or nasal ulcers, photosensitive rash, elevated or scarring rashes, Raynaud's phenomenon, prior renal or liver disease, or history of seizures.  No history of prior blood clot in the legs or lungs, strokes or ischemic phenomenon. Never pregnant.   Denies nonhealing ulcers on the fingertips.  The patient denies any history of uveitis, crampy abdominal pain, constipation, diarrhea, bloody stools, nodular painful shin bruises, psoriatic lesions,or  spooning or pitting of the nails.  There are no symptoms of severe dry mouth or recurrent cavities.  No fevers, chills, night sweats, unexpected weight loss, easy bruising or bleeding.  Denies chronic sinus infections/disease or epistaxis.  Denies chronic cough or hemoptysis.     Family hx:   No known family hx of lupus or other autoimmune disease.       Past Medical History:  Past Medical History:    Abdominal pain    Allergic rhinitis    Anemia    Anxiety    Back pain    Belching    Bloating    Blood in the stool    Change in hair    Chest pain    Chest pain on exertion    Constipation    Diarrhea, unspecified    Easy bruising    Esophageal reflux    Fatigue    Flatulence/gas pain/belching    Food intolerance    Frequent use of laxatives    Headache disorder    Heartburn    Indigestion    Irregular bowel habits    Lupus (systemic lupus erythematosus) (HCC)    Menses painful    Pain in joints    Pleural effusion    Problems with swallowing    Not often only when drinking large amounts of water worh medication    Shortness of breath    Sleep disturbance    Vomiting    Extreme cases    Wears glasses    Weight gain     Past Surgical History:  Past Surgical History:   Procedure Laterality Date    Colonoscopy       Family History:  Family History   Problem Relation Age of Onset     Diabetes Mother     Asthma Mother     Allergies Mother         fam hx     Social History:  Social History     Socioeconomic History    Marital status: Single   Tobacco Use    Smoking status: Never    Smokeless tobacco: Never   Vaping Use    Vaping status: Never Used   Substance and Sexual Activity    Alcohol use: Not Currently     Alcohol/week: 1.0 standard drink of alcohol     Types: 1 Glasses of wine per week     Comment: social    Drug use: No    Sexual activity: Never   Other Topics Concern    Caffeine Concern No    Weight Concern Yes    Exercise Yes     Medications:  Outpatient Medications Marked as Taking for the 4/30/24 encounter (Office Visit) with Germaine Ross DO   Medication Sig Dispense Refill    hydroxychloroquine 200 MG Oral Tab Take 1 tablet (200 mg total) by mouth 2 (two) times daily. 180 tablet 0    predniSONE 5 MG Oral Tab Take 1 tablet (5 mg total) by mouth daily. 90 tablet 0    Ferrous Sulfate 325 (65 Fe) MG Oral Tab Take 1 tablet (325 mg total) by mouth daily with breakfast. 90 tablet 0    azaTHIOprine 50 MG Oral Tab Take 0.5 tablets (25 mg total) by mouth daily. 90 tablet 0     Modified Medications    Modified Medication Previous Medication    FERROUS SULFATE 325 (65 FE) MG ORAL TAB Ferrous Sulfate 325 (65 Fe) MG Oral Tab       Take 1 tablet (325 mg total) by mouth daily with breakfast.    Take 1 tablet (325 mg total) by mouth daily with breakfast.    HYDROXYCHLOROQUINE 200 MG ORAL TAB hydroxychloroquine 200 MG Oral Tab       Take 1 tablet (200 mg total) by mouth 2 (two) times daily.    TAKE 1 TABLET BY MOUTH EVERY MORNING AND TAKE 1 TABLET BY MOUTH AT BEDTIME    PREDNISONE 5 MG ORAL TAB predniSONE 5 MG Oral Tab       Take 1 tablet (5 mg total) by mouth daily.    Take 1 tablet (5 mg total) by mouth daily.     Medications Discontinued During This Encounter   Medication Reason    ergocalciferol 1.25 MG (66125 UT) Oral Cap     Ferrous Sulfate 325 (65 Fe) MG Oral Tab     hydroxychloroquine 200 MG  Oral Tab     predniSONE 5 MG Oral Tab        ?  ?  Allergies:  No Known Allergies  ?  REVIEW OF SYSTEMS   ?  Review of Systems   Constitutional:  Positive for malaise/fatigue. Negative for chills and fever.   HENT:  Negative for congestion, hearing loss, nosebleeds and tinnitus.    Eyes:  Negative for blurred vision, pain and redness.   Respiratory:  Positive for shortness of breath (occasionally). Negative for cough and hemoptysis.    Cardiovascular:  Positive for chest pain. Negative for palpitations and leg swelling.   Gastrointestinal:  Positive for constipation (relates to iron). Negative for abdominal pain, blood in stool, diarrhea, heartburn and nausea.   Genitourinary:  Negative for dysuria, frequency, hematuria and urgency.   Musculoskeletal:  Positive for back pain, joint pain and neck pain. Negative for myalgias.        Typically around menstrual cycle   Skin:  Negative for itching and rash.   Neurological:  Negative for dizziness, tingling, focal weakness, seizures, weakness (generalized) and headaches.   Endo/Heme/Allergies:  Does not bruise/bleed easily.        + easy scarring   Psychiatric/Behavioral:  Negative for depression. The patient is not nervous/anxious and does not have insomnia.      PHYSICAL EXAM   Today's Vitals:  Temperature Blood Pressure Heart Rate Resp Rate SpO2   Temp: 98.1 °F (36.7 °C) BP: 90/60 Pulse: 65   SpO2: 99 %   ?  Current Weight Height BMI BSA Pain   Wt Readings from Last 1 Encounters:   04/30/24 169 lb 12.8 oz (77 kg)    Height: 5' 3\" (160 cm) Body mass index is 30.08 kg/m². Body surface area is 1.8 meters squared. Pain Score: 3 - (Mild)       Physical Exam  Vitals and nursing note reviewed.   Constitutional:       General: She is not in acute distress.     Appearance: Normal appearance. She is well-developed. She is not diaphoretic.   HENT:      Head: Normocephalic.   Eyes:      General: No scleral icterus.     Extraocular Movements: Extraocular movements intact.       Conjunctiva/sclera: Conjunctivae normal.   Neck:      Vascular: No JVD.      Trachea: No tracheal deviation.   Cardiovascular:      Rate and Rhythm: Normal rate and regular rhythm.      Heart sounds: Normal heart sounds. No murmur heard.     No friction rub. No gallop.   Pulmonary:      Effort: Pulmonary effort is normal. No respiratory distress.      Breath sounds: Normal breath sounds. No wheezing.   Musculoskeletal:         General: Deformity present. No swelling or tenderness.      Cervical back: Neck supple.      Comments: No evidence of heberden or andrew nodes of any of the fingers, no basilar joint tenderness of the 1st CMC bilaterally.  No swelling, tenderness, redness or restriction of motion of the DIPs, PIPs, MCPs, wrists, elbows, ankles, or joints of the feet.  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  Bilateral knees without medial joint line tenderness, no crepitus, no effusion.   Lymphadenopathy:      Cervical: No cervical adenopathy.   Skin:     General: Skin is warm and dry.      Findings: No erythema or rash.      Comments: No periungal erythema  No malar rash  Nails manicured    Neurological:      Mental Status: She is alert and oriented to person, place, and time.      Cranial Nerves: No cranial nerve deficit.      Gait: Gait normal.   Psychiatric:         Mood and Affect: Mood normal.         Behavior: Behavior normal.       ?  Radiology review:       Narrative   PROCEDURE:  CT ANGIOGRAPHY, CHEST (CPT=71275)     COMPARISON:  [facility], CT ANGIOGRAPHY CHEST (CPT=71275), 7/25/2023, 6:47 AM.     INDICATIONS:  R07.89 Other chest pain R06.02 Shortness of breath M32.12 Systemic lupus erythematosus (SLE) with pericarditis, unspecified SLE type (HCC)     TECHNIQUE:  IV contrast-enhanced multislice CT angiography is performed through the pulmonary arterial anatomy. 3D volume renderings are generated.  Dose  reduction techniques were used. Dose information is transmitted to the ACR (American College of  Radiology) NRDR (National Radiology Data Registry) which includes the Dose Index Registry.     PATIENT STATED HISTORY:(As transcribed by Technologist)  Patient states to have ongoing intermittent shortness of breath and chest tightness. She has a history of SLE with pericarditis.      CONTRAST USED:  105cc of Isovue 370     FINDINGS:    VASCULATURE:  No visible pulmonary arterial thrombus or attenuation.    THORACIC AORTA:  No aneurysm or visible dissection.    LUNGS:  No focal consolidation.  No pneumothorax.  The central airways are patent.  MEDIASTINUM:  There is a mildly prominent 9 mm prevascular node (series 5, image 72), which is unchanged from prior.  No new or enlarging mediastinal lymphadenopathy.  BETZY:  No mass or adenopathy.    CARDIAC:  There is stable mild cardiomegaly.  There is a small pericardial effusion.  PLEURA:  No mass or effusion.    CHEST WALL:  No mass or axillary adenopathy.    LIMITED ABDOMEN:  No acute findings.  BONES:  No bony lesion or fracture.    OTHER:  Negative.           Impression   CONCLUSION:    1. Small pericardial effusion and mild cardiomegaly, similar to prior.  2. Otherwise no acute findings or significant interval change.  See body of the report for further details.        LOCATION:  [facility]        Dictated by (CST): Rickey Blanco MD on 2/16/2024 at 2:46 PM      Finalized by (CST): Rickey Blanco MD on 2/16/2024 at 2:51 PM       DATE OF SERVICE: 07.15.2020     CT CHEST(CONTRAST ONLY) (CPT=71260)   CLINICAL INDICATION: Localized enlarged lymph nodes.   COMPARISON STUDY: [facility] 9/16/2019.   TECHNIQUE: Helical images of the chest were obtained from the thoracic inlet through the adrenal   glands. The data was reconstructed into 1.25 mm collimated axial images.   CONTRAST: 80 mL Isovue 370   Automated exposure control and ALARA manual techniques for patient  specific dose reduction were   followed while maintaining the necessary diagnostic image quality.   ADVERSE REACTION: None.   FINDINGS:     HEART/AORTA: Heart size is normal. There is marked improvement in previously noted pericardial   effusion. Pericardial fluid now measures approximately 4 mm in thickness anterior to the right   ventricle. Previously it measured up to 2.4 cm adjacent to the left ventricle. The thoracic aorta is   normal in caliber.   MEDIASTINUM/BETZY: There is interval decrease in size of mediastinal and hilar lymph nodes since the   prior study. Prevascular lymph node now measures 6 mm compared to 1 cm on the prior study. Right   hilar lymph node measures 8 mm compared to 1.5 cm on the prior study.   LUNGS AND AIRWAYS: There is interval resolution of consolidation/atelectasis involving the left lung   lingula and lower lobe. There are no new areas of consolidation in the lungs. There is a 2 mm nodule   in the right lung upper lobe on image 76.   PLEURA: There is no pleural effusion on the current study.   CHEST WALL, AXILLA, AND LOWER NECK: Mildly prominent bilateral axillary lymph nodes with fatty betzy   have slightly decreased in size. Left axillary lymph node measures 1.7 x 1.1 cm compared to 2.3 x   1.6 cm on the prior exam.   UPPER ABDOMEN: Unremarkable   OSSEOUS STRUCTURES: There are no aggressive osseous lesions.   Impression   IMPRESSION:   1. Interval improvement in pericardial effusion and left pleural effusion   2. Interval resolution of consolidation/atelectasis involving the left lung lingula and lower lobe   3. Interval decrease in size of mediastinal hilar and axillary lymph nodes   4. A 2 mm nodule in the right lung upper lobe. Per the Fleischner Society Guidelines (Radiology   2017), for patients at low risk for lung cancer no further imaging follow-up is suggested. For   patients at higher risk, such as smokers, a follow-up chest CT in 12 months is suggested. In the   setting  of known malignancy or an immunocompromise patient, follow-up chest CT in 3-6 months is   suggested.     PROCEDURE:  CT CHEST+ABDOMEN+PELVIS(ALL [facility]TR[facility] ONLY)(CPT=71260/69907)       COMPARISON:  [facility] , CT ANGIOGRAPHY, CHEST (CPT=71275), 12/20/2018, 21:04.  [facility] , CT ANGIOGRAPHY, CHEST (CPT=71275), 5/24/2019, 21:25.       INDICATIONS:  M79.10 Myalgia, unspecified site       TECHNIQUE:  IV contrast-enhanced scanning through the chest, abdomen, and pelvis was performed.  Dose reduction techniques were used. Dose information is transmitted to the ACR (American College of Radiology) NRDR (National Radiology Data Registry) which    includes the Dose Index Registry.       PATIENT STATED HISTORY:(As transcribed by Technologist)  Patient states she had pleurisy in October with swollen lymphnodes in upper axillary chest. Patient now has pain to left chest with a deep breath.        CONTRAST USED:  89cc of Omnipaque 350       FINDINGS:         CHEST:         LUNGS/PLEURA:  No active or acute process seen.  No pulmonary mass or nodule.  Minimal dependent atelectasis left lower lobe.  No pleural effusion.       THORACIC AORTA:  No aneurysm or other acute process       MEDIASTINUM/BETZY:  Mild enlarged hilar lymph nodes including on the right on image 35 a 13 mm lymph node transverse dimension, stable as remeasured by myself.       CARDIAC:  Unremarkable.       CHEST WALL:  Unremarkable.       OTHER:  Enlarged bilateral axillary lymph nodes.  Showing no change when compared with a very recent CT exam of the chest from 5/24/2019, the largest lymph node as remeasured myself, stable in size between the current on the prior exam, 27 x 18 mm series    2, image 22 and 23 current exam, series 4, image 64 prior exam.  Additional bilateral enlarged lymph nodes are also stable.       ABDOMEN/PELVIS:       LIVER:  Unremarkable.       BILIARY:  Unremarkable.       PANCREAS:  Unremarkable.       SPLEEN:  No splenomegaly.       KIDNEYS:  No  acute abnormality.       ADRENALS:  Unremarkable.       AORTA/VASCULAR:  No aortic aneurysm.       RETROPERITONEUM:  Numerous retroperitoneal lymph nodes are seen, most of which are sub cm, with 1 on series 2, image 156 just to the left of the left common iliac artery measuring 11 x 11 mm.  Another lymph node to the right of the iliac bifurcation on   image 173 measures 13 x 12 mm.       BOWEL/MESENTERY:  No acute process.       ABDOMINAL WALL:  Unremarkable.       URINARY BLADDER:  Unremarkable.       PELVIC NODES:  Pelvic sidewall enlarged lymph nodes including on the right image 191 measuring 14 x 24 mm and on the left measuring 11 x 19 mm image 194.       PELVIC ORGANS:  Heterogeneous appearance of the uterus, with a heterogeneous mass outside of the endometrial cavity, distorting the endometrial cavity measuring 6.6 cm, most typical for uterine fibroid.  Left adnexal cyst likely of ovarian origin maximum    dimension 3.2 cm, and medial posterior right adnexal cystic mass also likely of ovarian origin 2.1 cm.  Trace free pelvic fluid.         OTHER:  Scattered mildly enlarged inguinal lymph nodes..       SKELETAL STRUCTURES IN THE CHEST/ABDOMEN/PELVIS:       BONES:  No acute abnormality.           Impression   CONCLUSION:         Enlarged lymph nodes in the chest, abdomen, pelvis including axilla, to a lesser extent right hilum, with mild enlarged lymph nodes in the lower retroperitoneum along the iliac arteries, subcentimeter retroperitoneal lymph nodes, and then enlarged pelvic    sidewall lymph nodes and mildly enlarged inguinal lymph nodes.  The spleen does not appear enlarged.  No pleural effusion, ascites or pneumonia.  These lymph nodes are indeterminate, could reflect inflammatory etiology, with lymphoma also considered.     Consider biopsy of accessible lymph nodes.       Uterine fibroid.  Bilateral adnexal cystic lesions are most likely ovarian cyst.  Consider obtaining pelvic ultrasound to better  assess these pelvic abnormalities.           Dictated by: Roosevelt Saldivar MD on 6/08/2019 at 14:27       Approved by: Roosevelt Saldivar MD            Labs:  Lab Results   Component Value Date    WBC 2.5 (L) 04/23/2024    RBC 3.80 04/23/2024    HGB 10.7 (L) 04/23/2024    HCT 32.6 (L) 04/23/2024    .0 04/23/2024    MCV 85.8 04/23/2024    MCH 28.2 04/23/2024    MCHC 32.8 04/23/2024    RDW 14.6 04/23/2024    NEPRELIM 1.06 (L) 04/23/2024    NEPERCENT 42.4 04/23/2024    LYPERCENT 42.4 04/23/2024    MOPERCENT 13.2 04/23/2024    EOPERCENT 1.2 04/23/2024    BAPERCENT 0.4 04/23/2024    NE 1.06 (L) 04/23/2024    LYMABS 1.06 04/23/2024    MOABSO 0.33 04/23/2024    EOABSO 0.03 04/23/2024    BAABSO 0.01 04/23/2024     Lab Results   Component Value Date    GLU 73 04/23/2024    BUN 10 04/23/2024    BUNCREA 10.8 04/23/2024    CREATSERUM 0.93 04/23/2024    ANIONGAP 10 04/23/2024    GFRNAA 85 07/29/2022    GFRAA 98 07/29/2022    CA 9.4 04/23/2024    OSMOCALC 296 (H) 04/23/2024    ALKPHO 55 04/23/2024    AST 17 04/23/2024    ALT <7 (L) 04/23/2024    BILT 0.3 04/23/2024    TP 7.7 04/23/2024    ALB 4.0 04/23/2024    GLOBULIN 3.7 04/23/2024     04/23/2024    K 3.8 04/23/2024     04/23/2024    CO2 24.0 04/23/2024     2D ECHO 01/2020  Conclusions:     1. Left ventricle: The cavity size was normal. Systolic function was normal.      The estimated ejection fraction was 55%.   2. Pericardium, extracardiac: A trivial to small pericardial effusion was      identified. There was no evidence of hemodynamic compromise. Pericardial      effusion size appears to be less compared to previous echocardiogram.     Impressions:  Compared to the previous study, these findings represent   improvement.     Additional Labs:  04/2024  CBC with WBC 2.5; Hgb 10.7; plt 368  UA grossly negative  Urine protein to creatinine ratio 0.06 normal  IgA 388.10 elevated  IgG 1824 elevated  IgM 77 normal   dsDNA 160 elevated   ESR 40 borderline   CRP normal    C4 15 normal  C3 79.8 borderline low   Ferritin/iron studies  B12 821 normal  Vit D 59 normal.     02/2024  CMP grossly normal  C3 78 low  C4 16.1 normal  UA grossly negative  Urine protein to creatinine ratio 0.06 normal  Iron studies low normal  TPMT pending     01/2024  dsDNA 1: 640  CBC with WBC 2.9; Hgb 11.9; plt 288; MCV 85  IgA 404 elevated  IgG 1770 elevated  IgM 87.1  ESR 49 elevated  CRP 1.03 elevated  Ferritin 36.1    01/2024  CBC with WBC 2.9; Hgb 11.9; plt 288; MCV 85  IgA 404 elevated  IgG 1770 elevated  IgM 87.1  ESR 49 elevated  CRP 1.03 elevated  Ferritin 36.1  dsDNA pending     10/2023  CBC with WBC 4.1; hemoglobin 12.5; platelet 332; MCV 85.5  CMP grossly normal  Iron 51; percent sat 14 low  IgA 350 elevated  IgG 1530 normal  IgM 64.2 normal  Urine protein to creatinine ratio normal  ESR 43 elevated  CRP 1.0 elevated  C3 97.2 normal  C4 25.3 normal  B12 1111 elevated  Ferritin 34.9 normal  Vitamin D 24.8 Low   normal  Homocysteine 8.4 normal  dsDNA 1: 1280 high positive    07/2023  Ferritin normal  Iron 24; percent sat 7 low  Urine protein to creatinine ratio 0.11  UA grossly negative  dsDNA 1:640  C4 25.8 normal  C3 107 normal  ESR 35 borderline elevated  CRP 1.32 elevated  CMP grossly normal  CBC with WBC 4.1; hemoglobin 11.4; MCV 83.6; platelet 318    04/2023  GURDEEP screen positive  KAILEE panel with SSA >240  dsDNA 80 positive   Ferritin 12.1 borderline low  Iron 43; percent sat 12 both low  Vitamin D 49.9 normal  ESR 29 borderline elevated  CRP 0.39 borderline elevated  CMP grossly normal  CBC with WBC 3.4; hemoglobin 11.5; platelet 340    01/2023  SSA >240.0 positive  SSB, Brown, Nuha 1 RNP, RNP 70, centromere, SCL 70, Nuha 1 negative  Vitamin D 22.6 low  Urine protein to creatinine ratio 0.12 normal  UA with trace protein otherwise grossly negative  dsDNA IgG 117 positive  ESR 34 borderline elevated  CRP 0.80 borderline elevated  CMP grossly normal  CBC with WBC 4.0; Hgb 11.3; plt  308    10/2022  UA protein 30; small leuk esterase and few squamous cells otherwise negative  dsDNA 1:320  C4 24.8 normal  C3 106 normal  Vitamin D 25.6 low  ESR 31 elevated  CRP 0.33 borderline  CMP grossly normal  CBC with WBC 4.4; hemoglobin 11.9; platelet 375  Urine protein to creatinine ratio 0.14    07/2022  Ferritin 13.2 borderline low  Iron and percent saturation low  C4 24.8 normal  C3 111 normal  ESR 18 normal  CRP 0.68 borderline elevated  CMP grossly normal  CBC with WBC 3.6; hemoglobin 11.2; platelet 372  Antiphospholipid antibodies pending  dsDNA 1: 640    3/2022  Vit D 37.5  Iron and % sat low   Ferritin 14 normal   CBC with WBC 5.1 ; hemoglobin 11.5; MCV 83.6; platelet 369  CMP grossly normal  ESR 20 normal   CRP normal   DsDNA 1:1280    01/2022  CBC with WBC 7.4; hemoglobin 11.9; MCV 84.7; platelet 367  CMP grossly normal  CRP 1.04 elevated  ESR 25 borderline  dsDNA 1: 2560  C4 20.1 normal  C3 105 normal  Vitamin D 16.1 low  LAC negative  ACL negative  B2G IgM 9.7 equivocal; IgG negative  Urine protein creatinine ratio 0.16  UA negative for protein or blood (small leuk esterase)    06/2021  UA grossly normal  Smith negative  ESR 20  C4 18.6  C3 103  dsDNA 276  GURDEEP screen positive no titer  CMP grossly normal  CBC grossly normal    08/2021   QuantiFERON TB negative    04/2021  Vitamin D 28.4 low    09/2019   RIMMA virus negative  Smooth muscle antibody negative  Aldolase normal  Serum ACE normal  Lupus anticoagulant positive    QuantiFERON-TB indeterminant  Ferritin 235 elevated  Iron normal    Germaine Ross DO  [facility]  04/30/2024

## 2024-05-29 NOTE — TELEPHONE ENCOUNTER
Does she need to follow up with SA or her cardiologist to get test results?  
Left message for pt to read MCM and/or call the office.    
Pt had CTA on 2-16-24 ordered by Dr. Ross.  Pt is not sure if she should be following up with Dr. Jim or with cardiologist.  Please advise.    
Reviewed recommendations from Dr. Jim with pt.  Pt has not additional questions.  
She needs to see cardiology for right heart cath and evaluation of pericardial effusion
Patient requests all Lab, Cardiology, and Radiology Results on their Discharge Instructions

## 2024-07-31 NOTE — PROGRESS NOTES
RHEUMATOLOGY FOLLOW UP   Date of visit: 07/31/2024  ?  Chief Complaint   Patient presents with    Follow - Up     LOV 4/30/2024. Rapid 3 score is a 4.3.     ASSESSMENT, DISCUSSION & PLAN   Assessment:  1. Systemic lupus erythematosus (SLE) with pericarditis, unspecified SLE type (HCC)    2. High risk medication use    3. Iron deficiency anemia due to chronic blood loss    4. Inflammatory arthritis    5. Elevated sed rate    6. Elevated C-reactive protein (CRP)    7. Elevated immunoglobulin A    8. Gluten-sensitive enteropathy            Discussion:  Ms. Eleanor Martinez is a 28 yo woman who was otherwise healthy, until she was diagnosed with lupus in 2019. She has had pericarditis, pleuritis as well as fatigue and oral ulcers. Chart review reveals significant diffuse lymphadenopathy at time of diagnosis. She was initially placed on steroids and mycophenolate. Had colchicine added to her regimen due to persistent chest pain. While she get some shortness of breath and pains, they do not seem related to lupus. And has been told at one point by pulm likely more neuro-muscle related. Previously discussed that some of her depression/anxiety may be contributing to some of her symptoms.     Since her last visit, she has been feeling worse- more joint pain, stiffness/swelling, increased rib pain and fatigue. She does seem to have some food intolerance and seems to be eating more gluten than she intended to.  She is also following with both cardiology and pulmonology. Earlier this year, her CTA of the chest showed normal lungs but some cardiomegaly and small pericardial effusion which per the report are stable.. Spoke to pulm and after his personal review of CTA, there is concern for pulm artery enlargement.   Now transitioned to [facility] cards, so having to go through some work up prior to getting [facility].  Given the above, decision was made to change MMF to AZA at a prior visit. She has been tolerating very low dose of 25mg  daily without obvious side effects. Initially, she thought she was feeling better overall.  Recent labs show elevations in her inflammatory markers but stable complements and stable dsDNA levels.       -- increase azathioprine to 50mg daily   -- depo shot in office  -- increase prednisone to 20mg daily x 5 days, update me on Monday and will determine tapering dose thereafter  -- updated labs in 2 weeks.   -- continue hydroxychloroquine   -- continue to take iron and vitamin d daily  -- stay off colchicine and benlysta for now  -- remember yearly eye exams while on plaquenil   -- follow up in 3 months or sooner as needed  -- call with questions/concers in the meantime  -- we will be in communication with results when available     Previously discussed the risks and benefits of the use of azathioprine at length, including increased risk of infection and malignancy. Azathioprine can cause leukopenia and change blood counts, as well as cause a change in the liver enzymes. Initially, azathioprine can result in upset stomach as well. Eleanor Martinez understands this risk, and agrees that the benefits outweigh this risk for her systemic inflammatory disease.    Patient verbalized understanding of above instructions. No further questions at this time.    Code selection for this visit was based on time spent (40min) on date of service in preparing to see the patient, obtaining and/or reviewing separately obtained history, performing a medically appropriate examination, counseling and educating the patient/family/caregiver, ordering medications or testing, referring and communicating with other healthcare providers, documenting clinical information in the E HR, independently interpreting results and communicating results to the patient/family/caregiver and care coordination with the patient's other providers.       Plan:  Diagnoses and all orders for this visit:    Systemic lupus erythematosus (SLE) with pericarditis,  unspecified SLE type (HCC)  -     methylPREDNISolone acetate (DEPO-medrol) 40 MG/ML injection 40 mg  -     azaTHIOprine 50 MG Oral Tab; Take 0.5 tablets (25 mg total) by mouth daily.  -     hydroxychloroquine 200 MG Oral Tab; Take 1 tablet (200 mg total) by mouth 2 (two) times daily.  -     predniSONE 5 MG Oral Tab; Take 1 tablet (5 mg total) by mouth daily.  -     predniSONE 5 MG Oral Tab; Take 20mg daily x 5 days, then 15mg daily x 5 days, then 10mg daily x 5 days then resume normal 5mg daily.  -     CBC With Differential With Platelet; Standing  -     Comp Metabolic Panel (14); Standing  -     C-Reactive Protein; Standing  -     Sed Rate, Westergren (Automated); Standing    High risk medication use  -     azaTHIOprine 50 MG Oral Tab; Take 0.5 tablets (25 mg total) by mouth daily.  -     CBC With Differential With Platelet; Standing  -     Comp Metabolic Panel (14); Standing  -     C-Reactive Protein; Standing  -     Sed Rate, Westergren (Automated); Standing    Iron deficiency anemia due to chronic blood loss  -     Ferrous Sulfate 325 (65 Fe) MG Oral Tab; Take 1 tablet (325 mg total) by mouth 2 (two) times daily with meals.    Inflammatory arthritis  -     methylPREDNISolone acetate (DEPO-medrol) 40 MG/ML injection 40 mg  -     predniSONE 5 MG Oral Tab; Take 1 tablet (5 mg total) by mouth daily.  -     predniSONE 5 MG Oral Tab; Take 20mg daily x 5 days, then 15mg daily x 5 days, then 10mg daily x 5 days then resume normal 5mg daily.    Elevated sed rate  -     C-Reactive Protein; Standing  -     Sed Rate, Westergren (Automated); Standing    Elevated C-reactive protein (CRP)  -     C-Reactive Protein; Standing  -     Sed Rate, Westergren (Automated); Standing    Elevated immunoglobulin A  -     Immunoglobulin A, Quant; Future    Gluten-sensitive enteropathy  -     Immunoglobulin A, Quant; Future              Return in about 3 months (around 10/31/2024).  ?  HPI   Eleanor Martinez is a 29 year old female with  the following active problems who recently transitioned care to me. She has a history of lupus (GURDEEP, dsDNA+) with joint pain, fatigue, pericarditis and pleuritis- on cellcept, benlysta, prednisone and colchicine. She presents for follow up.     Since her last visit, she hasn't been feeling well.  States she started to get some increased oral ulcers (thought initially burn).  Then one month ago, had some worsened right rib pain- thought related to some food exposure like nuts/popcorn but symptoms persisted for about a week. Still has some lingering pain.   Increased hiccups, involuntary  Also increased joint pain- varies in location- knees (feel better after exercising this morning), right elbow and right middle finger swelling.     Followed with pulmonology and cardiology. Was being evaluated for pulmonary hypertension vs pericardial effusion. Seen at [facility] this week and told unlikely symptoms related to heart. Has to review ECHOs. Has plans to get CT of heart and coronary arteries as well as heart monitor.   Increased shortness of breath.   Harder to exercise due to the symptoms   Denies left sided chest pain like before but still having right sided pain.   No more back/shoulder pain   + feet/leg swelling has come     Denies skin rashes   + worsened fatigue   Denies cough or hemoptysis       Feels certain foods trigger her pain- like chinese food/fried rice, ETOH, garlic.   Still dealing with heavier cycles. Has been taking iron daily. Denies improvement of energy but has noticed improvement of hair loss. Has not followed with gyne yet.   But does taking magnesium prn and vit c, stopped protein powder.       Denies current dry eyes, wearing glasses more   Denies dry mouth  Denies photosensitivity.     Currently taking:  Plaquenil 200mg BID- last eye exam in December ([facility]- new provider; told everything normal)  Azathioprine 25mg daily   Prednisone 5mg daily  Colchicine stopped almost one full  year   Last Paul was in the summer 2023  Restarted PPI but not sure helping with her rib pain    HPI from initial consultation  referred for rheumatologic evaluation due to second opinion regarding her lupus.     In 2018, she first started experiencing some symptoms.   Had seen a new PCP- and had blood testing which showed some leukopenia and anemia but since pt wasn't symptomatic, nothing done initially.  Had some chest pain and initially thought related to heavy exercise. Ignored symptoms.  Had a rash that started over her abdomen that spread throughout the body and sparing the face. Had self-medicated with some antibacterial soap and herbal remedies, took about 2-3 months to resolve. There was some minimal scarring.  Then developed some pleurisy- chest pain with associated shortness of breath. Had elevated ddimer- always negative for blood clots- had multiple CT and US. States 4th time that year, further testing was done.  Her brother was in medical school at that time and suggested further testing for lupus. Repeat testing was positive.   Had some facial swelling and throat swelling and left arm pain with lymph node enlargement by the breast.  Would get lymph nodes in the neck and pain with swallowing.   Chest pain was so severe, she could not walk up the steps  In 2019, was evaluated in ER and had ECHO and further testing which had established diagnosed with pericarditis.   Had episode of fainting prior to admission.   Started on plaquenil immediately. Started on IV steroids and eventually tapered down to 10mg of prednisone. Has not attempted to decrease further yet. Has been on 10mg of prednisone for several months.  Was started on Cellcept during the hospitalization. Was on medication until September of 2021 due to insurance coverage.   Has been on colchicine since 2020 due to persistent chest pain without the shortness of breath. Felt like she could not work out without some sort of compression. ECHOs  and CT scans have shown improvement.   Colchicine has helped.   Recently had Benlysta added to regimen last month since cellcept not approved.   Is doing self-injection with the Benlysta but only did one dose so far.     Denies significant joint pain except the bottom of the feet/ankles.   States the week prior to her menstrual cycle, she has significant pain in shoulders and bottom of feet, fatigued as well as swelling in her fingers. Cannot wear rings around her cycle due to the swelling.     Was seeing pulmonology- Dr. Perera and has not followed up due to the pandemic     + feel some hair thinning at the corners of her scalp; is also coming out in clumps.   + fatigue with sun exposure   Recent blood counts have been normal   Denies difficulty swallowing now or sore throat but can still get some discomfort with \"flares\" which resolves with gargling with apple cider vinegar and salt.   + diarrhea which she attributes to colchicine   + achilles tendon pain  + dry eyes, not severe and occasional per pt. Attributes to working on computer all day. Does not think severe enough to need eye drop  + neck and upper/mid back pain since hospitalization. Can radiate to the shoulder but typically has the chest pain at the same time.     The patient denies recurrent oral or nasal ulcers, photosensitive rash, elevated or scarring rashes, Raynaud's phenomenon, prior renal or liver disease, or history of seizures.  No history of prior blood clot in the legs or lungs, strokes or ischemic phenomenon. Never pregnant.   Denies nonhealing ulcers on the fingertips.  The patient denies any history of uveitis, crampy abdominal pain, constipation, diarrhea, bloody stools, nodular painful shin bruises, psoriatic lesions,or  spooning or pitting of the nails.  There are no symptoms of severe dry mouth or recurrent cavities.  No fevers, chills, night sweats, unexpected weight loss, easy bruising or bleeding.  Denies chronic sinus  infections/disease or epistaxis.  Denies chronic cough or hemoptysis.     Family hx:   No known family hx of lupus or other autoimmune disease.       Past Medical History:  Past Medical History:    Abdominal pain    Allergic rhinitis    Anemia    Anxiety    Back pain    Belching    Bloating    Blood in the stool    Change in hair    Chest pain    Chest pain on exertion    Constipation    Diarrhea, unspecified    Easy bruising    Esophageal reflux    Fatigue    Flatulence/gas pain/belching    Food intolerance    Frequent use of laxatives    Headache disorder    Heartburn    Indigestion    Irregular bowel habits    Lupus (systemic lupus erythematosus) (HCC)    Menses painful    Pain in joints    Pleural effusion    Problems with swallowing    Not often only when drinking large amounts of water worh medication    Shortness of breath    Sleep disturbance    Vomiting    Extreme cases    Wears glasses    Weight gain     Past Surgical History:  Past Surgical History:   Procedure Laterality Date    Colonoscopy       Family History:  Family History   Problem Relation Age of Onset    Diabetes Mother     Asthma Mother     Allergies Mother         fam hx     Social History:  Social History     Socioeconomic History    Marital status: Single   Tobacco Use    Smoking status: Never    Smokeless tobacco: Never   Vaping Use    Vaping status: Never Used   Substance and Sexual Activity    Alcohol use: Not Currently     Alcohol/week: 1.0 standard drink of alcohol     Types: 1 Glasses of wine per week     Comment: social    Drug use: No    Sexual activity: Never   Other Topics Concern    Caffeine Concern No    Weight Concern Yes    Exercise Yes     Social Determinants of Health      Received [facility]    Housing Stability     Medications:  Outpatient Medications Marked as Taking for the 7/31/24 encounter (Office Visit) with Germaine Ross,    Medication Sig Dispense Refill    betamethasone dipropionate 0.05 %  External Lotion APPLY TO THE AFFECTED AREA OF RIGHT SCALP TWICE DAILY      azaTHIOprine 50 MG Oral Tab Take 0.5 tablets (25 mg total) by mouth daily. 90 tablet 0    Ferrous Sulfate 325 (65 Fe) MG Oral Tab Take 1 tablet (325 mg total) by mouth 2 (two) times daily with meals. 180 tablet 0    hydroxychloroquine 200 MG Oral Tab Take 1 tablet (200 mg total) by mouth 2 (two) times daily. 180 tablet 0    predniSONE 5 MG Oral Tab Take 1 tablet (5 mg total) by mouth daily. 90 tablet 0    predniSONE 5 MG Oral Tab Take 20mg daily x 5 days, then 15mg daily x 5 days, then 10mg daily x 5 days then resume normal 5mg daily. 45 tablet 0     Modified Medications    Modified Medication Previous Medication    AZATHIOPRINE 50 MG ORAL TAB azaTHIOprine 50 MG Oral Tab       Take 0.5 tablets (25 mg total) by mouth daily.    Take 0.5 tablets (25 mg total) by mouth daily.    FERROUS SULFATE 325 (65 FE) MG ORAL TAB Ferrous Sulfate 325 (65 Fe) MG Oral Tab       Take 1 tablet (325 mg total) by mouth 2 (two) times daily with meals.    Take 1 tablet (325 mg total) by mouth daily with breakfast.    HYDROXYCHLOROQUINE 200 MG ORAL TAB hydroxychloroquine 200 MG Oral Tab       Take 1 tablet (200 mg total) by mouth 2 (two) times daily.    Take 1 tablet (200 mg total) by mouth 2 (two) times daily.    PREDNISONE 5 MG ORAL TAB predniSONE 5 MG Oral Tab       Take 1 tablet (5 mg total) by mouth daily.    Take 1 tablet (5 mg total) by mouth daily.     Medications Discontinued During This Encounter   Medication Reason    azaTHIOprine 50 MG Oral Tab     hydroxychloroquine 200 MG Oral Tab     predniSONE 5 MG Oral Tab     Ferrous Sulfate 325 (65 Fe) MG Oral Tab          ?  ?  Allergies:  No Known Allergies  ?  REVIEW OF SYSTEMS   ?  Review of Systems   Constitutional:  Positive for malaise/fatigue and weight loss (intentional). Negative for chills and fever.   HENT:  Negative for congestion, nosebleeds and tinnitus.    Eyes:  Negative for pain and redness.    Respiratory:  Positive for shortness of breath (occasionally). Negative for cough and hemoptysis.    Cardiovascular:  Positive for chest pain, palpitations and leg swelling.   Gastrointestinal:  Positive for abdominal pain and constipation (relates to iron). Negative for blood in stool, diarrhea, heartburn, nausea and vomiting.   Genitourinary:  Negative for dysuria, frequency, hematuria and urgency.   Musculoskeletal:  Positive for joint pain. Negative for back pain, myalgias and neck pain.   Skin:  Negative for itching and rash.   Neurological:  Positive for dizziness and weakness (generalized). Negative for tingling, focal weakness, seizures and headaches.   Endo/Heme/Allergies:  Does not bruise/bleed easily.        + easy scarring   Psychiatric/Behavioral:  Negative for depression. The patient is nervous/anxious (following with therapist). The patient does not have insomnia.      PHYSICAL EXAM   Today's Vitals:  Temperature Blood Pressure Heart Rate Resp Rate SpO2   Temp: 99.3 °F (37.4 °C) BP: 110/60 Pulse: 102 Resp: 16 SpO2: 94 %   ?  Current Weight Height BMI BSA Pain   Wt Readings from Last 1 Encounters:   07/31/24 167 lb (75.8 kg)    Height: 5' 3\" (160 cm) Body mass index is 29.58 kg/m². Body surface area is 1.79 meters squared.         Physical Exam  Vitals and nursing note reviewed.   Constitutional:       General: She is not in acute distress.     Appearance: Normal appearance. She is well-developed. She is not diaphoretic.   HENT:      Head: Normocephalic.   Eyes:      General: No scleral icterus.     Extraocular Movements: Extraocular movements intact.      Conjunctiva/sclera: Conjunctivae normal.   Neck:      Vascular: No JVD.      Trachea: No tracheal deviation.   Cardiovascular:      Rate and Rhythm: Normal rate and regular rhythm.      Heart sounds: Normal heart sounds. No murmur heard.     No friction rub. No gallop.   Pulmonary:      Effort: Pulmonary effort is normal. No respiratory distress.       Breath sounds: Normal breath sounds. No wheezing.   Musculoskeletal:         General: Swelling, tenderness and deformity present.      Cervical back: Neck supple.      Comments: No evidence of heberden or andrew nodes of any of the fingers, no basilar joint tenderness of the 1st CMC bilaterally.  No swelling, tenderness, redness or restriction of motion of the DIPs, PIPs, MCPs, wrists, elbows, ankles, or joints of the feet.  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  Bilateral knees with medial joint line tenderness, no crepitus, no effusion. Trace swelling.  Swelling over top of feet b/l.    Lymphadenopathy:      Cervical: No cervical adenopathy.   Skin:     General: Skin is warm and dry.      Findings: No erythema or rash.      Comments: No periungal erythema  No malar rash  Nails manicured    Neurological:      Mental Status: She is alert and oriented to person, place, and time.      Cranial Nerves: No cranial nerve deficit.      Gait: Gait normal.   Psychiatric:         Mood and Affect: Mood normal.         Behavior: Behavior normal.       ?  Radiology review:       Narrative   PROCEDURE:  CT ANGIOGRAPHY, CHEST (CPT=71275)     COMPARISON:  [facility], CT ANGIOGRAPHY CHEST (CPT=71275), 7/25/2023, 6:47 AM.     INDICATIONS:  R07.89 Other chest pain R06.02 Shortness of breath M32.12 Systemic lupus erythematosus (SLE) with pericarditis, unspecified SLE type (HCC)     TECHNIQUE:  IV contrast-enhanced multislice CT angiography is performed through the pulmonary arterial anatomy. 3D volume renderings are generated.  Dose reduction techniques were used. Dose information is transmitted to the ACR (American College of  Radiology) NRDR (National Radiology Data Registry) which includes the Dose Index Registry.     PATIENT STATED HISTORY:(As transcribed by Technologist)  Patient states to have ongoing intermittent shortness of  breath and chest tightness. She has a history of SLE with pericarditis.      CONTRAST USED:  105cc of Isovue 370     FINDINGS:    VASCULATURE:  No visible pulmonary arterial thrombus or attenuation.    THORACIC AORTA:  No aneurysm or visible dissection.    LUNGS:  No focal consolidation.  No pneumothorax.  The central airways are patent.  MEDIASTINUM:  There is a mildly prominent 9 mm prevascular node (series 5, image 72), which is unchanged from prior.  No new or enlarging mediastinal lymphadenopathy.  BETZY:  No mass or adenopathy.    CARDIAC:  There is stable mild cardiomegaly.  There is a small pericardial effusion.  PLEURA:  No mass or effusion.    CHEST WALL:  No mass or axillary adenopathy.    LIMITED ABDOMEN:  No acute findings.  BONES:  No bony lesion or fracture.    OTHER:  Negative.           Impression   CONCLUSION:    1. Small pericardial effusion and mild cardiomegaly, similar to prior.  2. Otherwise no acute findings or significant interval change.  See body of the report for further details.        LOCATION:  [facility]        Dictated by (CST): Rickey Blanco MD on 2/16/2024 at 2:46 PM      Finalized by (CST): Rickey Blanco MD on 2/16/2024 at 2:51 PM       DATE OF SERVICE: 07.15.2020     CT CHEST(CONTRAST ONLY) (CPT=71260)   CLINICAL INDICATION: Localized enlarged lymph nodes.   COMPARISON STUDY: [facility] 9/16/2019.   TECHNIQUE: Helical images of the chest were obtained from the thoracic inlet through the adrenal   glands. The data was reconstructed into 1.25 mm collimated axial images.   CONTRAST: 80 mL Isovue 370   Automated exposure control and ALARA manual techniques for patient specific dose reduction were   followed while maintaining the necessary diagnostic image quality.   ADVERSE REACTION: None.   FINDINGS:     HEART/AORTA: Heart size is normal. There is marked improvement in previously noted pericardial   effusion. Pericardial fluid now measures approximately 4 mm in thickness  anterior to the right   ventricle. Previously it measured up to 2.4 cm adjacent to the left ventricle. The thoracic aorta is   normal in caliber.   MEDIASTINUM/BETZY: There is interval decrease in size of mediastinal and hilar lymph nodes since the   prior study. Prevascular lymph node now measures 6 mm compared to 1 cm on the prior study. Right   hilar lymph node measures 8 mm compared to 1.5 cm on the prior study.   LUNGS AND AIRWAYS: There is interval resolution of consolidation/atelectasis involving the left lung   lingula and lower lobe. There are no new areas of consolidation in the lungs. There is a 2 mm nodule   in the right lung upper lobe on image 76.   PLEURA: There is no pleural effusion on the current study.   CHEST WALL, AXILLA, AND LOWER NECK: Mildly prominent bilateral axillary lymph nodes with fatty betzy   have slightly decreased in size. Left axillary lymph node measures 1.7 x 1.1 cm compared to 2.3 x   1.6 cm on the prior exam.   UPPER ABDOMEN: Unremarkable   OSSEOUS STRUCTURES: There are no aggressive osseous lesions.   Impression   IMPRESSION:   1. Interval improvement in pericardial effusion and left pleural effusion   2. Interval resolution of consolidation/atelectasis involving the left lung lingula and lower lobe   3. Interval decrease in size of mediastinal hilar and axillary lymph nodes   4. A 2 mm nodule in the right lung upper lobe. Per the Fleischner Society Guidelines (Radiology   2017), for patients at low risk for lung cancer no further imaging follow-up is suggested. For   patients at higher risk, such as smokers, a follow-up chest CT in 12 months is suggested. In the   setting of known malignancy or an immunocompromise patient, follow-up chest CT in 3-6 months is   suggested.     PROCEDURE:  CT CHEST+ABDOMEN+PELVIS(ALL CNTRST ONLY)(CPT=71260/62430)       COMPARISON:  [facility] , CT ANGIOGRAPHY, CHEST (CPT=71275), 12/20/2018, 21:04.  [facility] CT ANGIOGRAPHY, CHEST (TPB=38010),  5/24/2019, 21:25.       INDICATIONS:  M79.10 Myalgia, unspecified site       TECHNIQUE:  IV contrast-enhanced scanning through the chest, abdomen, and pelvis was performed.  Dose reduction techniques were used. Dose information is transmitted to the ACR (American College of Radiology) NRDR (National Radiology Data Registry) which    includes the Dose Index Registry.       PATIENT STATED HISTORY:(As transcribed by Technologist)  Patient states she had pleurisy in October with swollen lymphnodes in upper axillary chest. Patient now has pain to left chest with a deep breath.        CONTRAST USED:  89cc of Omnipaque 350       FINDINGS:         CHEST:         LUNGS/PLEURA:  No active or acute process seen.  No pulmonary mass or nodule.  Minimal dependent atelectasis left lower lobe.  No pleural effusion.       THORACIC AORTA:  No aneurysm or other acute process       MEDIASTINUM/BETZY:  Mild enlarged hilar lymph nodes including on the right on image 35 a 13 mm lymph node transverse dimension, stable as remeasured by myself.       CARDIAC:  Unremarkable.       CHEST WALL:  Unremarkable.       OTHER:  Enlarged bilateral axillary lymph nodes.  Showing no change when compared with a very recent CT exam of the chest from 5/24/2019, the largest lymph node as remeasured myself, stable in size between the current on the prior exam, 27 x 18 mm series    2, image 22 and 23 current exam, series 4, image 64 prior exam.  Additional bilateral enlarged lymph nodes are also stable.       ABDOMEN/PELVIS:       LIVER:  Unremarkable.       BILIARY:  Unremarkable.       PANCREAS:  Unremarkable.       SPLEEN:  No splenomegaly.       KIDNEYS:  No acute abnormality.       ADRENALS:  Unremarkable.       AORTA/VASCULAR:  No aortic aneurysm.       RETROPERITONEUM:  Numerous retroperitoneal lymph nodes are seen, most of which are sub cm, with 1 on series 2, image 156 just to the left of the left common iliac artery measuring 11 x 11 mm.  Another  lymph node to the right of the iliac bifurcation on   image 173 measures 13 x 12 mm.       BOWEL/MESENTERY:  No acute process.       ABDOMINAL WALL:  Unremarkable.       URINARY BLADDER:  Unremarkable.       PELVIC NODES:  Pelvic sidewall enlarged lymph nodes including on the right image 191 measuring 14 x 24 mm and on the left measuring 11 x 19 mm image 194.       PELVIC ORGANS:  Heterogeneous appearance of the uterus, with a heterogeneous mass outside of the endometrial cavity, distorting the endometrial cavity measuring 6.6 cm, most typical for uterine fibroid.  Left adnexal cyst likely of ovarian origin maximum    dimension 3.2 cm, and medial posterior right adnexal cystic mass also likely of ovarian origin 2.1 cm.  Trace free pelvic fluid.         OTHER:  Scattered mildly enlarged inguinal lymph nodes..       SKELETAL STRUCTURES IN THE CHEST/ABDOMEN/PELVIS:       BONES:  No acute abnormality.           Impression   CONCLUSION:         Enlarged lymph nodes in the chest, abdomen, pelvis including axilla, to a lesser extent right hilum, with mild enlarged lymph nodes in the lower retroperitoneum along the iliac arteries, subcentimeter retroperitoneal lymph nodes, and then enlarged pelvic    sidewall lymph nodes and mildly enlarged inguinal lymph nodes.  The spleen does not appear enlarged.  No pleural effusion, ascites or pneumonia.  These lymph nodes are indeterminate, could reflect inflammatory etiology, with lymphoma also considered.     Consider biopsy of accessible lymph nodes.       Uterine fibroid.  Bilateral adnexal cystic lesions are most likely ovarian cyst.  Consider obtaining pelvic ultrasound to better assess these pelvic abnormalities.           Dictated by: Roosevelt Saldivar MD on 6/08/2019 at 14:27       Approved by: Roosevelt Saldivar MD            Labs:  Lab Results   Component Value Date    WBC 3.8 (L) 07/25/2024    RBC 4.14 07/25/2024    HGB 11.5 (L) 07/25/2024    HCT 35.1 07/25/2024    .0  07/25/2024    MCV 84.8 07/25/2024    MCH 27.8 07/25/2024    MCHC 32.8 07/25/2024    RDW 14.2 07/25/2024    NEPRELIM 2.32 07/25/2024    NEPERCENT 61.0 07/25/2024    LYPERCENT 24.7 07/25/2024    MOPERCENT 12.4 07/25/2024    EOPERCENT 1.1 07/25/2024    BAPERCENT 0.5 07/25/2024    NE 2.32 07/25/2024    LYMABS 0.94 (L) 07/25/2024    MOABSO 0.47 07/25/2024    EOABSO 0.04 07/25/2024    BAABSO 0.02 07/25/2024     Lab Results   Component Value Date    GLU 73 07/25/2024    BUN 9 07/25/2024    BUNCREA 9.0 (L) 05/11/2024    CREATSERUM 0.97 07/25/2024    ANIONGAP 6 07/25/2024    GFRNAA 85 07/29/2022    GFRAA 98 07/29/2022    CA 9.6 07/25/2024    OSMOCALC 285 07/25/2024    ALKPHO 62 07/25/2024    AST 12 07/25/2024    ALT 9 (L) 07/25/2024    BILT 0.3 07/25/2024    TP 7.9 07/25/2024    ALB 4.0 07/25/2024    GLOBULIN 3.9 (H) 07/25/2024     07/25/2024    K 4.0 07/25/2024     07/25/2024    CO2 27.0 07/25/2024     2D ECHO 01/2020  Conclusions:     1. Left ventricle: The cavity size was normal. Systolic function was normal.      The estimated ejection fraction was 55%.   2. Pericardium, extracardiac: A trivial to small pericardial effusion was      identified. There was no evidence of hemodynamic compromise. Pericardial      effusion size appears to be less compared to previous echocardiogram.     Impressions:  Compared to the previous study, these findings represent   improvement.     Additional Labs:  07/2024  CBC with WBC 3.8; Hgb 11.5; plt 339  UA grossly negative  Urine protein to creatinine ratio 0.05 normal  IgA 501.1 elevated  IgG 2215 elevated  IgM 97.6 normal   dsDNA 160 elevated   ESR 60 borderline   CRP 1.20 elevated    C4 15.1 normal  C3 86.4 normal   Ferritin 32.1 normal     04/2024  CBC with WBC 2.5; Hgb 10.7; plt 368  UA grossly negative  Urine protein to creatinine ratio 0.06 normal  IgA 388.10 elevated  IgG 1824 elevated  IgM 77 normal   dsDNA 160 elevated   ESR 40 borderline   CRP normal   C4 15 normal  C3  79.8 borderline low   Ferritin/iron studies  B12 821 normal  Vit D 59 normal.     02/2024  CMP grossly normal  C3 78 low  C4 16.1 normal  UA grossly negative  Urine protein to creatinine ratio 0.06 normal  Iron studies low normal  TPMT pending     01/2024  dsDNA 1: 640  CBC with WBC 2.9; Hgb 11.9; plt 288; MCV 85  IgA 404 elevated  IgG 1770 elevated  IgM 87.1  ESR 49 elevated  CRP 1.03 elevated  Ferritin 36.1    01/2024  CBC with WBC 2.9; Hgb 11.9; plt 288; MCV 85  IgA 404 elevated  IgG 1770 elevated  IgM 87.1  ESR 49 elevated  CRP 1.03 elevated  Ferritin 36.1  dsDNA pending     10/2023  CBC with WBC 4.1; hemoglobin 12.5; platelet 332; MCV 85.5  CMP grossly normal  Iron 51; percent sat 14 low  IgA 350 elevated  IgG 1530 normal  IgM 64.2 normal  Urine protein to creatinine ratio normal  ESR 43 elevated  CRP 1.0 elevated  C3 97.2 normal  C4 25.3 normal  B12 1111 elevated  Ferritin 34.9 normal  Vitamin D 24.8 Low   normal  Homocysteine 8.4 normal  dsDNA 1: 1280 high positive    07/2023  Ferritin normal  Iron 24; percent sat 7 low  Urine protein to creatinine ratio 0.11  UA grossly negative  dsDNA 1:640  C4 25.8 normal  C3 107 normal  ESR 35 borderline elevated  CRP 1.32 elevated  CMP grossly normal  CBC with WBC 4.1; hemoglobin 11.4; MCV 83.6; platelet 318    04/2023  GURDEEP screen positive  KAILEE panel with SSA >240  dsDNA 80 positive   Ferritin 12.1 borderline low  Iron 43; percent sat 12 both low  Vitamin D 49.9 normal  ESR 29 borderline elevated  CRP 0.39 borderline elevated  CMP grossly normal  CBC with WBC 3.4; hemoglobin 11.5; platelet 340    01/2023  SSA >240.0 positive  SSB, Brown, Nuha 1 RNP, RNP 70, centromere, SCL 70, Nuha 1 negative  Vitamin D 22.6 low  Urine protein to creatinine ratio 0.12 normal  UA with trace protein otherwise grossly negative  dsDNA IgG 117 positive  ESR 34 borderline elevated  CRP 0.80 borderline elevated  CMP grossly normal  CBC with WBC 4.0; Hgb 11.3; plt 308    10/2022  UA protein  30; small leuk esterase and few squamous cells otherwise negative  dsDNA 1:320  C4 24.8 normal  C3 106 normal  Vitamin D 25.6 low  ESR 31 elevated  CRP 0.33 borderline  CMP grossly normal  CBC with WBC 4.4; hemoglobin 11.9; platelet 375  Urine protein to creatinine ratio 0.14    07/2022  Ferritin 13.2 borderline low  Iron and percent saturation low  C4 24.8 normal  C3 111 normal  ESR 18 normal  CRP 0.68 borderline elevated  CMP grossly normal  CBC with WBC 3.6; hemoglobin 11.2; platelet 372  Antiphospholipid antibodies pending  dsDNA 1: 640    3/2022  Vit D 37.5  Iron and % sat low   Ferritin 14 normal   CBC with WBC 5.1 ; hemoglobin 11.5; MCV 83.6; platelet 369  CMP grossly normal  ESR 20 normal   CRP normal   DsDNA 1:1280    01/2022  CBC with WBC 7.4; hemoglobin 11.9; MCV 84.7; platelet 367  CMP grossly normal  CRP 1.04 elevated  ESR 25 borderline  dsDNA 1: 2560  C4 20.1 normal  C3 105 normal  Vitamin D 16.1 low  LAC negative  ACL negative  B2G IgM 9.7 equivocal; IgG negative  Urine protein creatinine ratio 0.16  UA negative for protein or blood (small leuk esterase)    06/2021  UA grossly normal  Smith negative  ESR 20  C4 18.6  C3 103  dsDNA 276  GURDEEP screen positive no titer  CMP grossly normal  CBC grossly normal    08/2021   QuantiFERON TB negative    04/2021  Vitamin D 28.4 low    09/2019   RIMMA virus negative  Smooth muscle antibody negative  Aldolase normal  Serum ACE normal  Lupus anticoagulant positive    QuantiFERON-TB indeterminant  Ferritin 235 elevated  Iron normal    Germaine Ross DO  [facility]  07/31/2024

## 2024-08-01 NOTE — PATIENT INSTRUCTIONS
-- increase azathioprine to 50mg daily   -- depo shot in office  -- increase prednisone to 20mg daily x 5 days, update me on Monday and will determine tapering dose thereafter  -- updated labs in 2 weeks.   -- continue hydroxychloroquine   -- continue to take iron and vitamin d daily  -- stay off colchicine and benlysta for now  -- remember yearly eye exams while on plaquenil   -- follow up in 3 months or sooner as needed  -- call with questions/concers in the meantime  -- we will be in communication with results when available     Dr. Ross

## 2024-09-11 NOTE — TELEPHONE ENCOUNTER
Future Appointments   Date Time Provider Department Center   10/29/2024  9:00 AM Germaine Ross DO [facility]   1/31/2025  8:30 AM Germaine Ross, DO [facility]   4/29/2025  8:30 AM Germaine Ross, DO [facility]   7/29/2025  8:30 AM Germaine Ross, DO [facility]   10/31/2025  8:30 AM Germaine Ross DO [facility]     Last office visit: 7/31/2024    Last fill: 7/31/2024 90 tab, 0 refills

## 2024-10-10 NOTE — TELEPHONE ENCOUNTER
Spoke with pt.  She read her MCM. She states she had fasted for the blood work. Aware of need to call PCP.    Kidney and liver function are normal.  Your blood sugar was a bit low (be sure to talk to your PCP about this)  Your blood counts are lower than they were in August (the count and hemoglobin indicating a little bit more anemia.  Your sed rate has improved and your CRP remains normal, remember these are the inflammatory markers.

## 2024-10-25 NOTE — TELEPHONE ENCOUNTER
Last office visit: 7/31/2024      Next Rheum Apt:10/29/2024 Germaine Ross DO    Last fill: 9/23/2024  4 tabs,  28 days supply     Last vitamin D level 4/23/2024  normal 59 and no pending future vitamin D order     Labs:   Lab Results   Component Value Date    CREATSERUM 0.90 10/05/2024    ALKPHO 53 10/05/2024    AST 14 10/05/2024    ALT 8 (L) 10/05/2024    BILT 0.3 10/05/2024    TP 7.7 10/05/2024    ALB 4.1 10/05/2024       Lab Results   Component Value Date    WBC 2.8 (L) 10/05/2024    HGB 10.9 (L) 10/05/2024    .0 10/05/2024    NEPRELIM 1.22 (L) 10/05/2024    NEPERCENT 43.6 10/05/2024    LYPERCENT 36.1 10/05/2024    NE 1.22 (L) 10/05/2024    LYMABS 1.01 10/05/2024

## 2024-10-29 NOTE — PROGRESS NOTES
RHEUMATOLOGY FOLLOW UP   Date of visit: 10/29/2024  ?  Chief Complaint   Patient presents with    SLE     3 month f/u. Feeling ok. About the same. After staring azathioprine started having right sided chest pain upon sudden movements. Working well for previous symptoms. Having less swelling. Less pain when bending down. Still having shortness of breath. Converted rapid score of 3.3     ASSESSMENT, DISCUSSION & PLAN   Assessment:  1. Systemic lupus erythematosus (SLE) with pericarditis, unspecified SLE type (HCC)    2. Iron deficiency anemia due to chronic blood loss    3. Elevated sed rate    4. Elevated immunoglobulin A    5. Gluten-sensitive enteropathy    6. Long term (current) use of systemic steroids    7. Ds DNA antibody positive    8. Antiphospholipid antibody positive    9. Shortness of breath    10. Immunocompromised state due to drug therapy (HCC)    11. Other neutropenia (HCC)    12. High risk medication use          Discussion:  Ms. Eleanor Martinez is a 28 yo woman who was otherwise healthy, until she was diagnosed with lupus in 2019. She has had pericarditis, pleuritis as well as fatigue and oral ulcers. Chart review reveals significant diffuse lymphadenopathy at time of diagnosis. She was initially placed on steroids and mycophenolate. Had colchicine added to her regimen due to persistent chest pain. While she get some shortness of breath and pains, they do not seem related to lupus. And has been told at one point by pulm likely more neuro-muscle related. Previously discussed that some of her depression/anxiety may be contributing to some of her symptoms.     At her prior visit, she had been feeling worse- more joint pain, stiffness/swelling, increased rib pain and fatigue. She does seem to have some food intolerance and seems to be eating more gluten than she intended to.  She is also following with both cardiology and pulmonology. Earlier this year, her CTA of the chest showed normal lungs but  some cardiomegaly and small pericardial effusion which per the report are stable.. Spoke to pulm and after his personal review of CTA, there is concern for pulm artery enlargement.   Was seen at [facility], so having to go through some work up prior to getting RHC- however pt reports not being notified of next steps and did not follow through  Given the above, decision was made to change MMF to AZA at a prior visit. She has been tolerating low dose of 50mg daily without obvious side effects. Her inflammatory markers are the lowest they have been in some time. She still has right sided rib pain (was previously left) and joint pain but feels well overall and has been monitoring her diet.     -- continue azathioprine 50mg daily   -- decrease plaquenil based off weight to 400mg 5 days of the week and 200mg the other two days  -- continue prednisone 5mg   -- updated labs in 2-4 weeks (monitor iron studies and CBC)  -- continue to take iron and vitamin d daily  -- stay off colchicine and benlysta for now  -- remember yearly eye exams while on plaquenil   -- est care with hematology to consider iron infusion (in case this is contributing to your elevated HR and shortness of breath)  -- est care with local cards and follow back with pulmonology regarding possible right heart cath   -- follow up in 3 months or sooner as needed  -- call with questions/concers in the meantime  -- we will be in communication with results when available     Previously discussed the risks and benefits of the use of azathioprine at length, including increased risk of infection and malignancy. Azathioprine can cause leukopenia and change blood counts, as well as cause a change in the liver enzymes. Initially, azathioprine can result in upset stomach as well. Eleanor Dasmorosita Martinez understands this risk, and agrees that the benefits outweigh this risk for her systemic inflammatory disease.    Patient verbalized understanding of above instructions. No  further questions at this time.    LOS based off complexity of care and decision making        Plan:  Diagnoses and all orders for this visit:    Systemic lupus erythematosus (SLE) with pericarditis, unspecified SLE type (HCC)  -     CBC W Differential W Platelet [E]; Future  -     Iron And Tibc; Future  -     Ferritin; Future  -     Complement C3, Serum; Future  -     Complement C4, Serum; Future  -     dsDNA Antibody by IFA [E]; Future  -     Immunoglobulin A/G/M, Quant; Future  -     UA/M With Culture Reflex [E]; Future  -     Protein/Creatinine Ratio, Urine Random; Future  -     azaTHIOprine 50 MG Oral Tab; Take 1 tablet (50 mg total) by mouth daily.  -     hydroxychloroquine 200 MG Oral Tab; Take 1 tablet (200 mg total) by mouth 2 (two) times daily.    Iron deficiency anemia due to chronic blood loss  -     CBC W Differential W Platelet [E]; Future  -     Iron And Tibc; Future  -     Ferritin; Future    Elevated sed rate  -     Complement C3, Serum; Future  -     Complement C4, Serum; Future  -     dsDNA Antibody by IFA [E]; Future  -     Immunoglobulin A/G/M, Quant; Future  -     UA/M With Culture Reflex [E]; Future  -     Protein/Creatinine Ratio, Urine Random; Future    Elevated immunoglobulin A  -     Complement C3, Serum; Future  -     Complement C4, Serum; Future  -     dsDNA Antibody by IFA [E]; Future  -     Immunoglobulin A/G/M, Quant; Future  -     UA/M With Culture Reflex [E]; Future  -     Protein/Creatinine Ratio, Urine Random; Future    Gluten-sensitive enteropathy  -     Immunoglobulin A/G/M, Quant; Future    Long term (current) use of systemic steroids    Ds DNA antibody positive  -     Complement C3, Serum; Future  -     Complement C4, Serum; Future  -     dsDNA Antibody by IFA [E]; Future  -     Immunoglobulin A/G/M, Quant; Future  -     UA/M With Culture Reflex [E]; Future  -     Protein/Creatinine Ratio, Urine Random; Future    Antiphospholipid antibody positive    Shortness of  breath    Immunocompromised state due to drug therapy (HCC)  -     Complement C3, Serum; Future  -     Complement C4, Serum; Future  -     dsDNA Antibody by IFA [E]; Future  -     Immunoglobulin A/G/M, Quant; Future  -     UA/M With Culture Reflex [E]; Future  -     Protein/Creatinine Ratio, Urine Random; Future    Other neutropenia (HCC)  -     CBC W Differential W Platelet [E]; Future  -     Iron And Tibc; Future  -     Ferritin; Future    High risk medication use  -     azaTHIOprine 50 MG Oral Tab; Take 1 tablet (50 mg total) by mouth daily.                Return in about 3 months (around 1/29/2025).  ?  HPI   Eleanor Martinez is a 29 year old female with the following active problems who recently transitioned care to me. She has a history of lupus (GURDEEP, dsDNA+) with joint pain, fatigue, pericarditis and pleuritis- on cellcept, benlysta, prednisone and colchicine. She presents for follow up.     Since her last visit, she been feeling better overall.  Has been stricter with her gluten exposure in diet- feels less flares related to food itself.   Traveled to [place]- and stuck with her diet- avoided ETOH, garlic and gluten.   Stopped certain chips too  Has not had any ulcers in the past month since stopping the chips (thought related to dyes)  Less swelling overall from fluid retention  Pain still present in the wrists. Knee pain is better except around her cycle.   Feels aza has helped with left sided symptoms but having a different pressure over the right lower ribs. Feels almost friction in the area when yawning or similar movements. Has been bothering her. Feels different than her costochondritis. Somewhat similar to the hiccups but without the physical activity.     Previously had increased shortness of breath- still present and does not think drastically changed. Still able to exercise regularly. Is conscious of adjusting work out if HR>150.     Prior significant fatigue- feels possibly better. Is doing  more so thinks her fatigue is more explainable. Still taking longer to recover.     Followed with pulmonology and cardiology. Was being evaluated for pulmonary hypertension vs pericardial effusion. Seen at [facility] previously, and told unlikely symptoms related to heart. Still hasn't received any call for the review ECHOs. Never told to get CT of heart and coronary arteries as well as heart monitor but there was no follow up.      Had neuropsych eval and dx with ADD- started on vyvanse but did not like how she felt. Then started adderall      Denies skin rashes   Denies cough or hemoptysis   Denies current dry eyes, wearing glasses more   Denies dry mouth  Denies photosensitivity.     Currently taking:  Plaquenil 200mg BID- last eye exam in December ([facility]- new provider; told everything normal)  Azathioprine 50mg daily - denies obvious side effects   Prednisone 5mg daily  Colchicine stopped almost one full year   Last Benlysta was in the summer 2023  Stopped PPI since not sure if helping.   But does taking magnesium prn and vit c, stopped protein powder.     HPI from initial consultation  referred for rheumatologic evaluation due to second opinion regarding her lupus.     In 2018, she first started experiencing some symptoms.   Had seen a new PCP- and had blood testing which showed some leukopenia and anemia but since pt wasn't symptomatic, nothing done initially.  Had some chest pain and initially thought related to heavy exercise. Ignored symptoms.  Had a rash that started over her abdomen that spread throughout the body and sparing the face. Had self-medicated with some antibacterial soap and herbal remedies, took about 2-3 months to resolve. There was some minimal scarring.  Then developed some pleurisy- chest pain with associated shortness of breath. Had elevated ddimer- always negative for blood clots- had multiple CT and US. States 4th time that year, further testing was done.  Her brother was  in medical school at that time and suggested further testing for lupus. Repeat testing was positive.   Had some facial swelling and throat swelling and left arm pain with lymph node enlargement by the breast.  Would get lymph nodes in the neck and pain with swallowing.   Chest pain was so severe, she could not walk up the steps  In 2019, was evaluated in ER and had ECHO and further testing which had established diagnosed with pericarditis.   Had episode of fainting prior to admission.   Started on plaquenil immediately. Started on IV steroids and eventually tapered down to 10mg of prednisone. Has not attempted to decrease further yet. Has been on 10mg of prednisone for several months.  Was started on Cellcept during the hospitalization. Was on medication until September of 2021 due to insurance coverage.   Has been on colchicine since 2020 due to persistent chest pain without the shortness of breath. Felt like she could not work out without some sort of compression. ECHOs and CT scans have shown improvement.   Colchicine has helped.   Recently had Benlysta added to regimen last month since cellcept not approved.   Is doing self-injection with the Benlysta but only did one dose so far.     Denies significant joint pain except the bottom of the feet/ankles.   States the week prior to her menstrual cycle, she has significant pain in shoulders and bottom of feet, fatigued as well as swelling in her fingers. Cannot wear rings around her cycle due to the swelling.     Was seeing pulmonology- Dr. Perera and has not followed up due to the pandemic     + feel some hair thinning at the corners of her scalp; is also coming out in clumps.   + fatigue with sun exposure   Recent blood counts have been normal   Denies difficulty swallowing now or sore throat but can still get some discomfort with \"flares\" which resolves with gargling with apple cider vinegar and salt.   + diarrhea which she attributes to colchicine   + achilles  tendon pain  + dry eyes, not severe and occasional per pt. Attributes to working on computer all day. Does not think severe enough to need eye drop  + neck and upper/mid back pain since hospitalization. Can radiate to the shoulder but typically has the chest pain at the same time.     The patient denies recurrent oral or nasal ulcers, photosensitive rash, elevated or scarring rashes, Raynaud's phenomenon, prior renal or liver disease, or history of seizures.  No history of prior blood clot in the legs or lungs, strokes or ischemic phenomenon. Never pregnant.   Denies nonhealing ulcers on the fingertips.  The patient denies any history of uveitis, crampy abdominal pain, constipation, diarrhea, bloody stools, nodular painful shin bruises, psoriatic lesions,or  spooning or pitting of the nails.  There are no symptoms of severe dry mouth or recurrent cavities.  No fevers, chills, night sweats, unexpected weight loss, easy bruising or bleeding.  Denies chronic sinus infections/disease or epistaxis.  Denies chronic cough or hemoptysis.     Family hx:   No known family hx of lupus or other autoimmune disease.       Past Medical History:  Past Medical History:    Abdominal pain    Allergic rhinitis    Anemia    Anxiety    Back pain    Belching    Bloating    Blood in the stool    Change in hair    Chest pain    Chest pain on exertion    Constipation    Diarrhea, unspecified    Easy bruising    Esophageal reflux    Fatigue    Flatulence/gas pain/belching    Food intolerance    Frequent use of laxatives    Headache disorder    Heartburn    Indigestion    Irregular bowel habits    Lupus (systemic lupus erythematosus) (HCC)    Menses painful    Pain in joints    Pleural effusion    Problems with swallowing    Not often only when drinking large amounts of water worh medication    Shortness of breath    Sleep disturbance    Vomiting    Extreme cases    Wears glasses    Weight gain     Past Surgical History:  Past Surgical  History:   Procedure Laterality Date    Colonoscopy       Family History:  Family History   Problem Relation Age of Onset    Diabetes Mother     Asthma Mother     Allergies Mother         fam hx     Social History:  Social History     Socioeconomic History    Marital status: Single   Tobacco Use    Smoking status: Never    Smokeless tobacco: Never   Vaping Use    Vaping status: Never Used   Substance and Sexual Activity    Alcohol use: Not Currently     Alcohol/week: 1.0 standard drink of alcohol     Types: 1 Glasses of wine per week     Comment: social    Drug use: No    Sexual activity: Never   Other Topics Concern    Caffeine Concern No    Weight Concern Yes    Exercise Yes     Social Drivers of Health      Received from [facility]    Housing Stability     Medications:  Outpatient Medications Marked as Taking for the 10/29/24 encounter (Office Visit) with Germaine Ross DO   Medication Sig Dispense Refill    azaTHIOprine 50 MG Oral Tab Take 1 tablet (50 mg total) by mouth daily. 90 tablet 0    hydroxychloroquine 200 MG Oral Tab Take 1 tablet (200 mg total) by mouth 2 (two) times daily. 180 tablet 0    ERGOCALCIFEROL 1.25 MG (11234 UT) Oral Cap TAKE 1 CAPSULE BY MOUTH 1 TIME A WEEK 12 capsule 0    predniSONE 5 MG Oral Tab Take 1 tablet (5 mg total) by mouth daily. 90 tablet 0    betamethasone dipropionate 0.05 % External Lotion APPLY TO THE AFFECTED AREA OF RIGHT SCALP TWICE DAILY      Ferrous Sulfate 325 (65 Fe) MG Oral Tab Take 1 tablet (325 mg total) by mouth 2 (two) times daily with meals. 180 tablet 0     Modified Medications    Modified Medication Previous Medication    AZATHIOPRINE 50 MG ORAL TAB azaTHIOprine 50 MG Oral Tab       Take 1 tablet (50 mg total) by mouth daily.    Take 1 tablet (50 mg total) by mouth daily.    HYDROXYCHLOROQUINE 200 MG ORAL TAB hydroxychloroquine 200 MG Oral Tab       Take 1 tablet (200 mg total) by mouth 2 (two) times daily.    Take 1 tablet (200 mg total)  by mouth 2 (two) times daily.     Medications Discontinued During This Encounter   Medication Reason    hydroxychloroquine 200 MG Oral Tab     azaTHIOprine 50 MG Oral Tab            ?  ?  Allergies:  No Known Allergies  ?  REVIEW OF SYSTEMS   ?  Review of Systems   Constitutional:  Positive for malaise/fatigue. Negative for chills and fever.   Eyes:  Negative for pain and redness.   Respiratory:  Positive for shortness of breath (occasionally). Negative for cough and hemoptysis.    Cardiovascular:  Positive for palpitations (with exertion). Negative for chest pain and leg swelling.   Gastrointestinal:  Positive for constipation (relates to iron) and heartburn (leading up to menstrual). Negative for abdominal pain, blood in stool, diarrhea and nausea.   Genitourinary:  Negative for dysuria, frequency, hematuria and urgency.   Musculoskeletal:  Positive for joint pain. Negative for back pain, myalgias and neck pain.   Skin:  Negative for itching and rash.   Neurological:  Positive for weakness (generalized, stable). Negative for dizziness, tingling, focal weakness, seizures and headaches.   Endo/Heme/Allergies:  Does not bruise/bleed easily.        + easy scarring   Psychiatric/Behavioral:  Negative for depression. The patient is nervous/anxious (following with therapist). The patient does not have insomnia.      PHYSICAL EXAM   Today's Vitals:  Temperature Blood Pressure Heart Rate Resp Rate SpO2   Temp: 98 °F (36.7 °C) BP: 116/60 Pulse: 78 Resp: 16 SpO2: 100 %   ?  Current Weight Height BMI BSA Pain   Wt Readings from Last 1 Encounters:   10/29/24 161 lb (73 kg)    Height: 5' 3\" (160 cm) Body mass index is 28.52 kg/m². Body surface area is 1.76 meters squared.         Physical Exam  Vitals and nursing note reviewed.   Constitutional:       General: She is not in acute distress.     Appearance: Normal appearance. She is well-developed. She is not diaphoretic.   HENT:      Head: Normocephalic.   Eyes:      General: No  scleral icterus.     Extraocular Movements: Extraocular movements intact.      Conjunctiva/sclera: Conjunctivae normal.   Neck:      Vascular: No JVD.      Trachea: No tracheal deviation.   Cardiovascular:      Rate and Rhythm: Normal rate and regular rhythm.      Heart sounds: Normal heart sounds. No murmur heard.     No friction rub. No gallop.   Pulmonary:      Effort: Pulmonary effort is normal. No respiratory distress.      Breath sounds: Normal breath sounds. No wheezing.   Musculoskeletal:         General: Deformity present. No swelling or tenderness.      Cervical back: Neck supple.      Comments: No evidence of heberden or andrew nodes of any of the fingers, no basilar joint tenderness of the 1st CMC bilaterally.  No swelling, tenderness, redness or restriction of motion of the DIPs, PIPs, MCPs, wrists, elbows, ankles, or joints of the feet.  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  Bilateral knees with medial joint line tenderness, no crepitus, no effusion. Trace swelling.  Swelling over top of feet b/l. - resolved   Lymphadenopathy:      Cervical: No cervical adenopathy.   Skin:     General: Skin is warm and dry.      Findings: No erythema or rash.      Comments: No periungal erythema  No malar rash  Nails manicured    Neurological:      Mental Status: She is alert and oriented to person, place, and time.      Cranial Nerves: No cranial nerve deficit.      Gait: Gait normal.   Psychiatric:         Mood and Affect: Mood normal.         Behavior: Behavior normal.       ?  Radiology review:       Narrative   PROCEDURE:  CT ANGIOGRAPHY, CHEST (CPT=71275)     COMPARISON:  [facility], CT ANGIOGRAPHY CHEST (CPT=71275), 7/25/2023, 6:47 AM.     INDICATIONS:  R07.89 Other chest pain R06.02 Shortness of breath M32.12 Systemic lupus erythematosus (SLE) with pericarditis, unspecified SLE type (HCC)     TECHNIQUE:  IV  contrast-enhanced multislice CT angiography is performed through the pulmonary arterial anatomy. 3D volume renderings are generated.  Dose reduction techniques were used. Dose information is transmitted to the ACR (American College of  Radiology) NRDR (National Radiology Data Registry) which includes the Dose Index Registry.     PATIENT STATED HISTORY:(As transcribed by Technologist)  Patient states to have ongoing intermittent shortness of breath and chest tightness. She has a history of SLE with pericarditis.      CONTRAST USED:  105cc of Isovue 370     FINDINGS:    VASCULATURE:  No visible pulmonary arterial thrombus or attenuation.    THORACIC AORTA:  No aneurysm or visible dissection.    LUNGS:  No focal consolidation.  No pneumothorax.  The central airways are patent.  MEDIASTINUM:  There is a mildly prominent 9 mm prevascular node (series 5, image 72), which is unchanged from prior.  No new or enlarging mediastinal lymphadenopathy.  BETZY:  No mass or adenopathy.    CARDIAC:  There is stable mild cardiomegaly.  There is a small pericardial effusion.  PLEURA:  No mass or effusion.    CHEST WALL:  No mass or axillary adenopathy.    LIMITED ABDOMEN:  No acute findings.  BONES:  No bony lesion or fracture.    OTHER:  Negative.           Impression   CONCLUSION:    1. Small pericardial effusion and mild cardiomegaly, similar to prior.  2. Otherwise no acute findings or significant interval change.  See body of the report for further details.        LOCATION:  [facility]        Dictated by (CST): Rickey Blanco MD on 2/16/2024 at 2:46 PM      Finalized by (CST): Rickey Blanco MD on 2/16/2024 at 2:51 PM       DATE OF SERVICE: 07.15.2020     CT CHEST(CONTRAST ONLY) (CPT=71260)   CLINICAL INDICATION: Localized enlarged lymph nodes.   COMPARISON STUDY: [facility] 9/16/2019.   TECHNIQUE: Helical images of the chest were obtained from the thoracic inlet through the adrenal   glands. The data was reconstructed  into 1.25 mm collimated axial images.   CONTRAST: 80 mL Isovue 370   Automated exposure control and ALARA manual techniques for patient specific dose reduction were   followed while maintaining the necessary diagnostic image quality.   ADVERSE REACTION: None.   FINDINGS:     HEART/AORTA: Heart size is normal. There is marked improvement in previously noted pericardial   effusion. Pericardial fluid now measures approximately 4 mm in thickness anterior to the right   ventricle. Previously it measured up to 2.4 cm adjacent to the left ventricle. The thoracic aorta is   normal in caliber.   MEDIASTINUM/BETZY: There is interval decrease in size of mediastinal and hilar lymph nodes since the   prior study. Prevascular lymph node now measures 6 mm compared to 1 cm on the prior study. Right   hilar lymph node measures 8 mm compared to 1.5 cm on the prior study.   LUNGS AND AIRWAYS: There is interval resolution of consolidation/atelectasis involving the left lung   lingula and lower lobe. There are no new areas of consolidation in the lungs. There is a 2 mm nodule   in the right lung upper lobe on image 76.   PLEURA: There is no pleural effusion on the current study.   CHEST WALL, AXILLA, AND LOWER NECK: Mildly prominent bilateral axillary lymph nodes with fatty betzy   have slightly decreased in size. Left axillary lymph node measures 1.7 x 1.1 cm compared to 2.3 x   1.6 cm on the prior exam.   UPPER ABDOMEN: Unremarkable   OSSEOUS STRUCTURES: There are no aggressive osseous lesions.   Impression   IMPRESSION:   1. Interval improvement in pericardial effusion and left pleural effusion   2. Interval resolution of consolidation/atelectasis involving the left lung lingula and lower lobe   3. Interval decrease in size of mediastinal hilar and axillary lymph nodes   4. A 2 mm nodule in the right lung upper lobe. Per the Fleischner Society Guidelines (Radiology   2017), for patients at low risk for lung cancer no further imaging  follow-up is suggested. For   patients at higher risk, such as smokers, a follow-up chest CT in 12 months is suggested. In the   setting of known malignancy or an immunocompromise patient, follow-up chest CT in 3-6 months is   suggested.     PROCEDURE:  CT CHEST+ABDOMEN+PELVIS(ALL CNTRST ONLY)(CPT=71260/90732)       COMPARISON:  [facility] , CT ANGIOGRAPHY, CHEST (CPT=71275), 12/20/2018, 21:04.  [facility] , CT ANGIOGRAPHY, CHEST (CPT=71275), 5/24/2019, 21:25.       INDICATIONS:  M79.10 Myalgia, unspecified site       TECHNIQUE:  IV contrast-enhanced scanning through the chest, abdomen, and pelvis was performed.  Dose reduction techniques were used. Dose information is transmitted to the ACR (American College of Radiology) NRDR (National Radiology Data Registry) which    includes the Dose Index Registry.       PATIENT STATED HISTORY:(As transcribed by Technologist)  Patient states she had pleurisy in October with swollen lymphnodes in upper axillary chest. Patient now has pain to left chest with a deep breath.        CONTRAST USED:  89cc of Omnipaque 350       FINDINGS:         CHEST:         LUNGS/PLEURA:  No active or acute process seen.  No pulmonary mass or nodule.  Minimal dependent atelectasis left lower lobe.  No pleural effusion.       THORACIC AORTA:  No aneurysm or other acute process       MEDIASTINUM/BETZY:  Mild enlarged hilar lymph nodes including on the right on image 35 a 13 mm lymph node transverse dimension, stable as remeasured by myself.       CARDIAC:  Unremarkable.       CHEST WALL:  Unremarkable.       OTHER:  Enlarged bilateral axillary lymph nodes.  Showing no change when compared with a very recent CT exam of the chest from 5/24/2019, the largest lymph node as remeasured myself, stable in size between the current on the prior exam, 27 x 18 mm series    2, image 22 and 23 current exam, series 4, image 64 prior exam.  Additional bilateral enlarged lymph nodes are also stable.       ABDOMEN/PELVIS:        LIVER:  Unremarkable.       BILIARY:  Unremarkable.       PANCREAS:  Unremarkable.       SPLEEN:  No splenomegaly.       KIDNEYS:  No acute abnormality.       ADRENALS:  Unremarkable.       AORTA/VASCULAR:  No aortic aneurysm.       RETROPERITONEUM:  Numerous retroperitoneal lymph nodes are seen, most of which are sub cm, with 1 on series 2, image 156 just to the left of the left common iliac artery measuring 11 x 11 mm.  Another lymph node to the right of the iliac bifurcation on   image 173 measures 13 x 12 mm.       BOWEL/MESENTERY:  No acute process.       ABDOMINAL WALL:  Unremarkable.       URINARY BLADDER:  Unremarkable.       PELVIC NODES:  Pelvic sidewall enlarged lymph nodes including on the right image 191 measuring 14 x 24 mm and on the left measuring 11 x 19 mm image 194.       PELVIC ORGANS:  Heterogeneous appearance of the uterus, with a heterogeneous mass outside of the endometrial cavity, distorting the endometrial cavity measuring 6.6 cm, most typical for uterine fibroid.  Left adnexal cyst likely of ovarian origin maximum    dimension 3.2 cm, and medial posterior right adnexal cystic mass also likely of ovarian origin 2.1 cm.  Trace free pelvic fluid.         OTHER:  Scattered mildly enlarged inguinal lymph nodes..       SKELETAL STRUCTURES IN THE CHEST/ABDOMEN/PELVIS:       BONES:  No acute abnormality.           Impression   CONCLUSION:         Enlarged lymph nodes in the chest, abdomen, pelvis including axilla, to a lesser extent right hilum, with mild enlarged lymph nodes in the lower retroperitoneum along the iliac arteries, subcentimeter retroperitoneal lymph nodes, and then enlarged pelvic    sidewall lymph nodes and mildly enlarged inguinal lymph nodes.  The spleen does not appear enlarged.  No pleural effusion, ascites or pneumonia.  These lymph nodes are indeterminate, could reflect inflammatory etiology, with lymphoma also considered.     Consider biopsy of accessible lymph nodes.        Uterine fibroid.  Bilateral adnexal cystic lesions are most likely ovarian cyst.  Consider obtaining pelvic ultrasound to better assess these pelvic abnormalities.           Dictated by: Roosevelt Saldivar MD on 6/08/2019 at 14:27       Approved by: Roosevelt Saldivar MD            Labs:  Lab Results   Component Value Date    WBC 2.8 (L) 10/05/2024    RBC 3.85 10/05/2024    HGB 10.9 (L) 10/05/2024    HCT 33.2 (L) 10/05/2024    .0 10/05/2024    MCV 86.2 10/05/2024    MCH 28.3 10/05/2024    MCHC 32.8 10/05/2024    RDW 15.2 10/05/2024    NEPRELIM 1.22 (L) 10/05/2024    NEPERCENT 43.6 10/05/2024    LYPERCENT 36.1 10/05/2024    MOPERCENT 16.4 10/05/2024    EOPERCENT 3.2 10/05/2024    BAPERCENT 0.7 10/05/2024    NE 1.22 (L) 10/05/2024    LYMABS 1.01 10/05/2024    MOABSO 0.46 10/05/2024    EOABSO 0.09 10/05/2024    BAABSO 0.02 10/05/2024     Lab Results   Component Value Date    GLU 69 (L) 10/05/2024    BUN 12 10/05/2024    BUNCREA 9.0 (L) 05/11/2024    CREATSERUM 0.90 10/05/2024    ANIONGAP 10 10/05/2024    GFRNAA 85 07/29/2022    GFRAA 98 07/29/2022    CA 9.5 10/05/2024    OSMOCALC 288 10/05/2024    ALKPHO 53 10/05/2024    AST 14 10/05/2024    ALT 8 (L) 10/05/2024    BILT 0.3 10/05/2024    TP 7.7 10/05/2024    ALB 4.1 10/05/2024    GLOBULIN 3.6 (H) 10/05/2024     10/05/2024    K 3.6 10/05/2024     10/05/2024    CO2 25.0 10/05/2024     2D ECHO 01/2020  Conclusions:     1. Left ventricle: The cavity size was normal. Systolic function was normal.      The estimated ejection fraction was 55%.   2. Pericardium, extracardiac: A trivial to small pericardial effusion was      identified. There was no evidence of hemodynamic compromise. Pericardial      effusion size appears to be less compared to previous echocardiogram.     Impressions:  Compared to the previous study, these findings represent   improvement.     Additional Labs:  10/2024  CBC with WBC 2.8; Hgb 10.9; plt 364  CMP grossly normal   ESR 33 borderline    CRP normal    08/2024  CBC with WBC 4.1; Hgb 11.4; plt 367  CMP grossly normal   IgA 286.80 elevated  ESR 39 borderline   CRP normal    07/2024  CBC with WBC 3.8; Hgb 11.5; plt 339  UA grossly negative  Urine protein to creatinine ratio 0.05 normal  IgA 501.1 elevated  IgG 2215 elevated  IgM 97.6 normal   dsDNA 160 elevated   ESR 60 borderline   CRP 1.20 elevated    C4 15.1 normal  C3 86.4 normal   Ferritin 32.1 normal     04/2024  CBC with WBC 2.5; Hgb 10.7; plt 368  UA grossly negative  Urine protein to creatinine ratio 0.06 normal  IgA 388.10 elevated  IgG 1824 elevated  IgM 77 normal   dsDNA 160 elevated   ESR 40 borderline   CRP normal   C4 15 normal  C3 79.8 borderline low   Ferritin/iron studies  B12 821 normal  Vit D 59 normal.     02/2024  CMP grossly normal  C3 78 low  C4 16.1 normal  UA grossly negative  Urine protein to creatinine ratio 0.06 normal  Iron studies low normal  TPMT pending     01/2024  dsDNA 1: 640  CBC with WBC 2.9; Hgb 11.9; plt 288; MCV 85  IgA 404 elevated  IgG 1770 elevated  IgM 87.1  ESR 49 elevated  CRP 1.03 elevated  Ferritin 36.1    01/2024  CBC with WBC 2.9; Hgb 11.9; plt 288; MCV 85  IgA 404 elevated  IgG 1770 elevated  IgM 87.1  ESR 49 elevated  CRP 1.03 elevated  Ferritin 36.1  dsDNA pending     10/2023  CBC with WBC 4.1; hemoglobin 12.5; platelet 332; MCV 85.5  CMP grossly normal  Iron 51; percent sat 14 low  IgA 350 elevated  IgG 1530 normal  IgM 64.2 normal  Urine protein to creatinine ratio normal  ESR 43 elevated  CRP 1.0 elevated  C3 97.2 normal  C4 25.3 normal  B12 1111 elevated  Ferritin 34.9 normal  Vitamin D 24.8 Low   normal  Homocysteine 8.4 normal  dsDNA 1: 1280 high positive    07/2023  Ferritin normal  Iron 24; percent sat 7 low  Urine protein to creatinine ratio 0.11  UA grossly negative  dsDNA 1:640  C4 25.8 normal  C3 107 normal  ESR 35 borderline elevated  CRP 1.32 elevated  CMP grossly normal  CBC with WBC 4.1; hemoglobin 11.4; MCV 83.6; platelet  318    04/2023  GURDEEP screen positive  KAILEE panel with SSA >240  dsDNA 80 positive   Ferritin 12.1 borderline low  Iron 43; percent sat 12 both low  Vitamin D 49.9 normal  ESR 29 borderline elevated  CRP 0.39 borderline elevated  CMP grossly normal  CBC with WBC 3.4; hemoglobin 11.5; platelet 340    01/2023  SSA >240.0 positive  SSB, Brown, Nuha 1 RNP, RNP 70, centromere, SCL 70, Nuha 1 negative  Vitamin D 22.6 low  Urine protein to creatinine ratio 0.12 normal  UA with trace protein otherwise grossly negative  dsDNA IgG 117 positive  ESR 34 borderline elevated  CRP 0.80 borderline elevated  CMP grossly normal  CBC with WBC 4.0; Hgb 11.3; plt 308    10/2022  UA protein 30; small leuk esterase and few squamous cells otherwise negative  dsDNA 1:320  C4 24.8 normal  C3 106 normal  Vitamin D 25.6 low  ESR 31 elevated  CRP 0.33 borderline  CMP grossly normal  CBC with WBC 4.4; hemoglobin 11.9; platelet 375  Urine protein to creatinine ratio 0.14    07/2022  Ferritin 13.2 borderline low  Iron and percent saturation low  C4 24.8 normal  C3 111 normal  ESR 18 normal  CRP 0.68 borderline elevated  CMP grossly normal  CBC with WBC 3.6; hemoglobin 11.2; platelet 372  Antiphospholipid antibodies pending  dsDNA 1: 640    3/2022  Vit D 37.5  Iron and % sat low   Ferritin 14 normal   CBC with WBC 5.1 ; hemoglobin 11.5; MCV 83.6; platelet 369  CMP grossly normal  ESR 20 normal   CRP normal   DsDNA 1:1280    01/2022  CBC with WBC 7.4; hemoglobin 11.9; MCV 84.7; platelet 367  CMP grossly normal  CRP 1.04 elevated  ESR 25 borderline  dsDNA 1: 2560  C4 20.1 normal  C3 105 normal  Vitamin D 16.1 low  LAC negative  ACL negative  B2G IgM 9.7 equivocal; IgG negative  Urine protein creatinine ratio 0.16  UA negative for protein or blood (small leuk esterase)    06/2021  UA grossly normal  Smith negative  ESR 20  C4 18.6  C3 103  dsDNA 276  GURDEEP screen positive no titer  CMP grossly normal  CBC grossly normal    08/2021   QuantiFERON TB  negative    04/2021  Vitamin D 28.4 low    09/2019   RIMMA virus negative  Smooth muscle antibody negative  Aldolase normal  Serum ACE normal  Lupus anticoagulant positive    QuantiFERON-TB indeterminant  Ferritin 235 elevated  Iron normal    Germaine Ross DO  [facility]  10/29/2024

## 2024-10-30 NOTE — PATIENT INSTRUCTIONS
-- continue azathioprine 50mg daily   -- decrease plaquenil based off weight to 400mg 5 days of the week and 200mg the other two days  -- continue prednisone 5mg   -- updated labs in 2-4 weeks (monitor iron studies and CBC)  -- continue to take iron and vitamin d daily  -- stay off colchicine and benlysta for now  -- remember yearly eye exams while on plaquenil   -- est care with hematology to consider iron infusion (in case this is contributing to your elevated HR and shortness of breath)  -- est care with local cards and follow back with pulmonology regarding possible right heart cath   -- follow up in 3 months or sooner as needed  -- call with questions/concers in the meantime  -- we will be in communication with results when available     Dr. Ross

## 2024-11-19 NOTE — TELEPHONE ENCOUNTER
Patient called the office stating she had a groin onset since Saturday that she states she first thought was an ingrown hair, but states that by Sunday it looked like a cluster of multiple bumps. She states that overnight or this morning that it burst and it is pretty deep. Please call back and advise.

## 2024-11-19 NOTE — TELEPHONE ENCOUNTER
Spoke to patient who reports a boil in  the groin area. Patient stated the boil popped last night, patient reports there was pus and blood coming out of the boil .After the boil popped, there is a \"deep hole\" and patient reports swelling to the area. Currently no active bleeding or pus. Patient does express some pain with touch. Patient is applying a warm compress to the area. Patient tried neosporin for burning sensation; no improvement. Patient is taking her daily medications.    Patient is immunocompromise with diagnosis of SLE    Patient is wondering if she should come in for OV? Or UC?    This nurse recommended patient to keep area clean and dry and if their is an active bleed to go to the UC. Since patient is at risk for infection    Patient v/u

## 2024-11-19 NOTE — TELEPHONE ENCOUNTER
I have ordered antibiotic called bactrim. She should take it. If symptoms worsen then LEANNE Zaidi DO

## 2024-11-19 NOTE — TELEPHONE ENCOUNTER
Spoke to patient about PCP recommendations and new medications. Patient V/U    Patient was wondering if she can receive laser hair removal since she does believe waxing has caused the boil, but wanted PCP input before due to her diagnosis of SLE      *MCM ok to send to patient

## 2024-11-20 NOTE — TELEPHONE ENCOUNTER
Spoke with patient to relay recommendations per Dr. Zaidi. Pt v/u. No further questions or concerns.

## 2024-11-20 NOTE — TELEPHONE ENCOUNTER
Laser treatment may cause delayed wound healing and scarring.  I would suggest to hold off, but she can discuss with her rheumatologist as well  Liana Zaidi DO

## 2024-12-18 NOTE — TELEPHONE ENCOUNTER
Last office visit: 10/29/24    Next Rheum Apt:1/31/2025 Cody Germaine, DO    Last fill: 7/31/24    Labs:   Lab Results   Component Value Date    CREATSERUM 0.90 10/05/2024    ALKPHO 53 10/05/2024    AST 14 10/05/2024    ALT 8 (L) 10/05/2024    BILT 0.3 10/05/2024    TP 7.7 10/05/2024    ALB 4.1 10/05/2024       Lab Results   Component Value Date    WBC 2.8 (L) 10/05/2024    HGB 10.9 (L) 10/05/2024    .0 10/05/2024    NEPRELIM 1.22 (L) 10/05/2024    NEPERCENT 43.6 10/05/2024    LYPERCENT 36.1 10/05/2024    NE 1.22 (L) 10/05/2024    LYMABS 1.01 10/05/2024

## 2024-12-23 NOTE — CONSULTS
[facility]cer Center Report of Consultation    Patient Name: Eleanor Martinez   YOB: 1995   Medical Record Number: [id number]   CSN: [id number]   Consulting Physician: Neris Kramer MD  Referring Physician(s): Liana Zaidi DO      Date of Consultation: 12/23/2024     Reason for Consultation:  Eleanor Martinez was seen today in the Cancer [facility] for the diagnosis of anemia    Chief Complaint:   Chief Complaint   Patient presents with    Consult     MICHELLE pt here for evaluation of recent labs. Has been taking oral iron for the past three years. C/O SOB and fatigue since 2019. Periods are heavy and once monthly. Diet is normal, denies bleeding. GI work 2 years ago. Hair loss has stopped since on oral iron, but nothing noticeable in her energy       History of Present Illness:   28 y/o female with a h/o SLE on prednisone, HCQ and azathioprine. Also with a long h/o anemia attributed to iron deficiency and likely chronic disease contributing. Has been on oral iron she said for the past 3 years. On her own increased iron to BID about a year ago. Has some GI symptoms on oral iron which she puts up with.  MICHELLE attributed to her periods which are heavy. Had reported some hematochezia. Had a GI w/u 2/2023 which showed patchy increase of intraepithelial eosinophils in the distal esophagus favored to be from reflux. Colonoscopy done showed hemorrhoids.     She reported SOB, fatigue and hair loss. She said her hair loss stopped on oral iron but has not noticed any improvement in her fatigue or SOB. Denies chest or abdominal pain, change in bowel or urinary habits, headaches, dizziness or visual symptoms.    She is not a vegetarian. She does not donate blood. Most recent CBC was from 10/5/24: 2.8 (ANC 1222)>10.9/33.2<364. Indices were normal. Iron studies from 7/2024: iron 42, ferritin 32.1, TIBC 265. Hgb has ranged from 8.6-12.5 going back to 2019.      Past Medical History:  Past Medical History:     Abdominal pain    Allergic rhinitis    Anemia    Anxiety    Back pain    Belching    Bloating    Blood in the stool    Change in hair    Chest pain    Chest pain on exertion    Constipation    Diarrhea, unspecified    Easy bruising    Esophageal reflux    Fatigue    Flatulence/gas pain/belching    Food intolerance    Frequent use of laxatives    Headache disorder    Heartburn    Indigestion    Irregular bowel habits    Lupus (systemic lupus erythematosus) (HCC)    Menses painful    Pain in joints    Pleural effusion    Problems with swallowing    Not often only when drinking large amounts of water worh medication    Shortness of breath    Sleep disturbance    Vomiting    Extreme cases    Wears glasses    Weight gain       Past Surgical History:  Past Surgical History:   Procedure Laterality Date    Colonoscopy         Family Medical History:  Family History   Problem Relation Age of Onset    Diabetes Mother     Asthma Mother     Allergies Mother         fam hx       Gyne History:  OB History   No obstetric history on file.       Psychosocial History:  Social History     Socioeconomic History    Marital status: Single     Spouse name: Not on file    Number of children: Not on file    Years of education: Not on file    Highest education level: Not on file   Occupational History    Not on file   Tobacco Use    Smoking status: Never    Smokeless tobacco: Never   Vaping Use    Vaping status: Never Used   Substance and Sexual Activity    Alcohol use: Not Currently     Alcohol/week: 1.0 standard drink of alcohol     Types: 1 Glasses of wine per week     Comment: social    Drug use: No    Sexual activity: Never   Other Topics Concern     Service Not Asked    Blood Transfusions Not Asked    Caffeine Concern No    Occupational Exposure Not Asked    Hobby Hazards Not Asked    Sleep Concern Not Asked    Stress Concern Not Asked    Weight Concern Yes    Special Diet Not Asked    Back Care Not Asked    Exercise Yes     Bike Helmet Not Asked    Seat Belt Not Asked    Self-Exams Not Asked   Social History Narrative    Not on file     Social Drivers of Health     Financial Resource Strain: Not on file   Food Insecurity: Not on file   Transportation Needs: Not on file   Physical Activity: Not on file   Stress: Not on file   Social Connections: Not on file   Housing Stability: Low Risk  (6/14/2024)    Received from [facility]    Housing Stability     Mortgage Payment Concerns?: Not on file     Number of Places Lived in the Last Year: Not on file     Unstable Housing?: Not on file       Allergies:   Allergies[1]    Current Medications:    Current Outpatient Medications:     FEROSUL 325 (65 Fe) MG Oral Tab, TAKE 1 TABLET BY MOUTH TWICE DAILY WITH MEALS, Disp: 180 tablet, Rfl: 0    amphetamine-dextroamphetamine ER 10 MG Oral Capsule SR 24 Hr, Take 1 capsule (10 mg total) by mouth every morning., Disp: , Rfl:     azaTHIOprine 50 MG Oral Tab, Take 1 tablet (50 mg total) by mouth daily., Disp: 90 tablet, Rfl: 0    hydroxychloroquine 200 MG Oral Tab, Take 1 tablet (200 mg total) by mouth 2 (two) times daily., Disp: 180 tablet, Rfl: 0    ERGOCALCIFEROL 1.25 MG (63302 UT) Oral Cap, TAKE 1 CAPSULE BY MOUTH 1 TIME A WEEK, Disp: 12 capsule, Rfl: 0    predniSONE 5 MG Oral Tab, Take 1 tablet (5 mg total) by mouth daily., Disp: 90 tablet, Rfl: 0    betamethasone dipropionate 0.05 % External Lotion, APPLY TO THE AFFECTED AREA OF RIGHT SCALP TWICE DAILY, Disp: , Rfl:     Review of Systems:  A 14-point ROS was done with pertinent positives and negative per the HPI    Vital Signs:  /73 (BP Location: Left arm, Patient Position: Sitting, Cuff Size: adult)   Pulse 97   Temp 97.8 °F (36.6 °C) (Tympanic)   Resp 18   Wt 72.1 kg (159 lb)   SpO2 97%   BMI 28.17 kg/m²     Physical Examination:  General: Patient is alert and oriented x 3, not in acute distress.  HEENT: EOMs intact. PERRL. Oropharynx is clear.   Neck: No JVD. No  palpable lymphadenopathy. Neck is supple.  Chest: Clear to auscultation.  Heart: Regular rate and rhythm.   Abdomen: Soft, non tender with good bowel sounds.  Extremities: Pedal pulses are present. No edema.  Neurological: Grossly intact.   Lymphatics: There is no palpable lymphadenopathy throughout in the cervical, supraclavicular, axillary, or inguinal regions.  Psych/Depression: Mood and affect are appropriate.    Laboratory:  Lab Component   Component Value Date/Time    RBC 3.85 10/05/2024 1011    RBC 4.15 08/17/2024 0949    RBC 4.14 07/25/2024 1654    HGB 10.9 (L) 10/05/2024 1011    HGB 11.4 (L) 08/17/2024 0949    HGB 11.5 (L) 07/25/2024 1654    HCT 33.2 (L) 10/05/2024 1011    HCT 35.7 08/17/2024 0949    HCT 35.1 07/25/2024 1654    MCV 86.2 10/05/2024 1011    MCV 86.0 08/17/2024 0949    MCV 84.8 07/25/2024 1654    MCH 28.3 10/05/2024 1011    MCH 27.5 08/17/2024 0949    MCH 27.8 07/25/2024 1654    MCHC 32.8 10/05/2024 1011    MCHC 31.9 08/17/2024 0949    MCHC 32.8 07/25/2024 1654    RDW 15.2 10/05/2024 1011    RDW 14.9 08/17/2024 0949    RDW 14.2 07/25/2024 1654    Neutrophil Absolute Prelim 1.22 (L) 10/05/2024 1011    Neutrophil Absolute Prelim 2.15 08/17/2024 0949    Neutrophil Absolute Prelim 2.32 07/25/2024 1654    WBC 2.8 (L) 10/05/2024 1011    WBC 4.1 08/17/2024 0949    WBC 3.8 (L) 07/25/2024 1654    .0 10/05/2024 1011    .0 08/17/2024 0949    .0 07/25/2024 1654       Lab Component   Component Value Date/Time    Glucose 69 (L) 10/05/2024 1011    Glucose 71 08/17/2024 0949    Glucose 73 07/25/2024 1654    BUN 12 10/05/2024 1011    BUN 10 08/17/2024 0949    BUN 9 07/25/2024 1654    Creatinine 0.90 10/05/2024 1011    Creatinine 0.90 08/17/2024 0949    Creatinine 0.97 07/25/2024 1654    GFR, -American 98 07/29/2022 0856    GFR, -American 110 03/26/2022 1014    GFR, -American 101 01/05/2022 1217    GFR, Non- 85 07/29/2022 0856    GFR, Non-  American 96 03/26/2022 1014    GFR, Non- 88 01/05/2022 1217    Calcium, Total 9.5 10/05/2024 1011    Calcium, Total 9.6 08/17/2024 0949    Calcium, Total 9.6 07/25/2024 1654    Albumin 4.1 10/05/2024 1011    Albumin 4.1 08/17/2024 0949    Albumin 4.0 07/25/2024 1654    Sodium 140 10/05/2024 1011    Sodium 136 08/17/2024 0949    Sodium 139 07/25/2024 1654    Potassium 3.6 10/05/2024 1011    Potassium 3.8 08/17/2024 0949    Potassium 4.0 07/25/2024 1654    Chloride 105 10/05/2024 1011    Chloride 105 08/17/2024 0949    Chloride 106 07/25/2024 1654    CO2 25.0 10/05/2024 1011    CO2 26.0 08/17/2024 0949    CO2 27.0 07/25/2024 1654    Alkaline Phosphatase 53 10/05/2024 1011    Alkaline Phosphatase 56 08/17/2024 0949    Alkaline Phosphatase 62 07/25/2024 1654    AST 14 10/05/2024 1011    AST 15 08/17/2024 0949    AST 12 07/25/2024 1654    ALT 8 (L) 10/05/2024 1011    ALT <7 (L) 08/17/2024 0949    ALT 9 (L) 07/25/2024 1654    Bilirubin, Total 0.3 10/05/2024 1011    Bilirubin, Total 0.3 08/17/2024 0949    Bilirubin, Total 0.3 07/25/2024 1654    Total Protein 7.7 10/05/2024 1011    Total Protein 7.6 08/17/2024 0949    Total Protein 7.9 07/25/2024 1654        Latest Reference Range & Units 07/25/24 16:54   Iron, Serum 50 - 170 ug/dL 42 (L)   Transferrin 250 - 380 mg/dL 207 (L)   Iron Bind.Cap.(TIBC) 250 - 425 ug/dL 265   Iron Saturation 15 - 50 % 16   FERRITIN 12.0 - 160.0 ng/mL 32.1   (L): Data is abnormally low      Radiology:  PROCEDURE:  CT ANGIOGRAPHY, CHEST (CPT=71275)     COMPARISON:  [facility], CT ANGIOGRAPHY CHEST (CPT=71275), 7/25/2023, 6:47 AM.     INDICATIONS:  R07.89 Other chest pain R06.02 Shortness of breath M32.12 Systemic lupus erythematosus (SLE) with pericarditis, unspecified SLE type (HCC)     TECHNIQUE:  IV contrast-enhanced multislice CT angiography is performed through the pulmonary arterial anatomy. 3D volume renderings are generated.  Dose reduction techniques were  used. Dose information is transmitted to the ACR (American College of  Radiology) NRDR (National Radiology Data Registry) which includes the Dose Index Registry.     PATIENT STATED HISTORY:(As transcribed by Technologist)  Patient states to have ongoing intermittent shortness of breath and chest tightness. She has a history of SLE with pericarditis.      CONTRAST USED:  105cc of Isovue 370     FINDINGS:    VASCULATURE:  No visible pulmonary arterial thrombus or attenuation.    THORACIC AORTA:  No aneurysm or visible dissection.    LUNGS:  No focal consolidation.  No pneumothorax.  The central airways are patent.  MEDIASTINUM:  There is a mildly prominent 9 mm prevascular node (series 5, image 72), which is unchanged from prior.  No new or enlarging mediastinal lymphadenopathy.  BETZY:  No mass or adenopathy.    CARDIAC:  There is stable mild cardiomegaly.  There is a small pericardial effusion.  PLEURA:  No mass or effusion.    CHEST WALL:  No mass or axillary adenopathy.    LIMITED ABDOMEN:  No acute findings.  BONES:  No bony lesion or fracture.    OTHER:  Negative.                     Impression   CONCLUSION:    1. Small pericardial effusion and mild cardiomegaly, similar to prior.  2. Otherwise no acute findings or significant interval change.  See body of the report for further details.           LOCATION:  [facility]        Dictated by (CST): Rickey Blanco MD on 2/16/2024 at 2:46 PM      Finalized by (CST): Rickey Blanco MD on 2/16/2024 at 2:51 PM           Impression & Plan:   1. Anemia  - multifactorial: chronic disease with iron deficiency and cannot rule out medication effect.  - iron deficiency attributed to heavy periods  - has not had recent labs and discussed drawing labs today and will also work-up for other potential contributing causes to her anemia  - continues to be iron deficient even on oral iron. Previous GI w/showed no source of bleeding. She even increased her dose of oral iron to BID. I  discussed with her that  the paradigm for oral iron repletion has evolved, as evidence has begun to emerge suggesting that excessive dosing is potentially counterproductive, decreasing iron absorption and increasing side effects without improving iron stores or anemia. Evidence suggests that daily dosing or alternate-day dosing (taking the iron every other day rather than every day) appears to result in equivalent or better iron absorption usually with fewer adverse effects. If she chooses to continue oral iron I recommend she take this once daily or every other day.     2. Decreased WBC with neutropenia and intermittent lymphopenia  - likely from her autoimmune disease with treatment probably contributing as well    3. SLE  - followed closely by rheum on prednisone, HCQ and azathioprine    4. Menorrhagia  - recommend she discuss with Gyne options to control her periods.     Labs today. Pending results will decide next steps and communicate through [employer]. She was comfortable with the plan.     I spent 50 minutes face to face with the patient.  More than 50% of that time was spent counseling the patient and/or on coordination of care.        Neris Kramer MD  [facility]    Addendum:  Labs we have so far below. Still anemic with component of iron deficiency. Ferritin is a tad better than before. As continues to be anemic despite oral iron discussed proceeding with IV iron. The office will call her when approved by insurance. Plan on repeating labs 4 weeks after infusion.     Neris Kramer MD  [facility]         [1] No Known Allergies

## 2024-12-23 NOTE — PATIENT INSTRUCTIONS
For triage nurse: [phone] Monday through Friday 7:30-5:00.  *Please note this is a new phone number*    After hours or weekends for emergent needs:  [phone].     To schedule diagnostic testing: Central [phone]    For Medical Records: [phone]

## 2025-01-31 NOTE — PROGRESS NOTES
RHEUMATOLOGY FOLLOW UP   Date of visit: 01/31/2025  ?  Chief Complaint   Patient presents with    SLE     3 month f/u. Feeling good up until last night when had heartburn. Some joint pain and shortness of breath that comes and goes. Swelling at the end of the day. No new symptoms. Converted rapid score of 2.7     ASSESSMENT, DISCUSSION & PLAN   Assessment:  1. Systemic lupus erythematosus (SLE) with pericarditis, unspecified SLE type (HCC)    2. Elevated sed rate    3. Long term (current) use of systemic steroids    4. Ds DNA antibody positive    5. Immunocompromised state due to drug therapy (HCC)    6. Other iron deficiency anemia    7. Other chest pain    8. Pericardial effusion (HCC)    9. Other neutropenia    10. Proteinuria, unspecified type    11. Inflammatory arthritis    12. High risk medication use            Discussion:  Ms. Eleanor Martinez is a 30 yo woman who was otherwise healthy, until she was diagnosed with lupus in 2019. She has had pericarditis, pleuritis as well as fatigue and oral ulcers. Chart review reveals significant diffuse lymphadenopathy at time of diagnosis. She was initially placed on steroids and mycophenolate. Had colchicine added to her regimen due to persistent chest pain. While she get some shortness of breath and pains, they do not seem related to lupus. And has been told at one point by pulm likely more neuro-muscle related. Previously discussed that some of her depression/anxiety may be contributing to some of her symptoms.     At her prior visit, she had been feeling worse- more joint pain, stiffness/swelling, increased rib pain and fatigue. She does seem to have some food intolerance and seems to be eating more gluten than she intended to.  She is also following with both cardiology and pulmonology. Earlier this year, her CTA of the chest showed normal lungs but some cardiomegaly and small pericardial effusion which per the report are stable.. Spoke to pulm and after his  personal review of CTA, there is concern for pulm artery enlargement.   Was seen at [facility], so having to go through some work up prior to getting RHC- however pt reports not being notified of next steps and did not follow through  Given the above, decision was made to change MMF to AZA at a prior visit. She has been tolerating low dose of 50mg daily without obvious side effects. Her inflammatory markers are the lowest they have been in some time. She still has right sided rib pain (was previously left) and joint pain but feels well overall and has been monitoring her diet.     She now has plans to see cardiology later today. Recommended she discuss possible pulm HTN, RHC vs cardiac PET given her lupus and persistent chest pain  Also recommended she follow back with GI for further workup since some of her symptoms (but not all) worsens with food exposure. Not currently taking PPI. Last upper and lower scopes in 2023 were negative.   She has new worsened proteinuria. Cr not drawn with recent labs. Will have her repeat urine studies in about 2 weeks and if still showing proteinuria, will have her see nephrology for further workup. Discussed that her dsDNA is stable and her complements are normal.     -- updated labs and urine testing in 2 weeks   -- continue azathioprine 50mg daily (may consider increasing further depending on workup by other specialists)  -- continue lower dose of plaquenil based off weight   -- continue prednisone 5mg   -- continue to take iron and vitamin d daily  -- stay off colchicine and benlysta for now  -- remember yearly eye exams while on plaquenil   -- follow up in 3 months or sooner as needed  -- call with questions/concers in the meantime  -- we will be in communication with results when available     Previously discussed the risks and benefits of the use of azathioprine at length, including increased risk of infection and malignancy. Azathioprine can cause leukopenia and change blood  counts, as well as cause a change in the liver enzymes. Initially, azathioprine can result in upset stomach as well. Eleanor Martinez understands this risk, and agrees that the benefits outweigh this risk for her systemic inflammatory disease.    Patient verbalized understanding of above instructions. No further questions at this time.    LOS based off complexity of care and decision making        Plan:  Diagnoses and all orders for this visit:    Systemic lupus erythematosus (SLE) with pericarditis, unspecified SLE type (HCC)  -     Comp Metabolic Panel (14); Future  -     C-Reactive Protein; Future  -     Sed Rate, Westergren (Automated); Future  -     Protein/Creatinine Ratio, Urine Random; Future  -     Urinalysis, Routine [E]; Future  -     predniSONE 5 MG Oral Tab; Take 1 tablet (5 mg total) by mouth daily.  -     azaTHIOprine 50 MG Oral Tab; Take 1 tablet (50 mg total) by mouth daily.    Elevated sed rate  -     Comp Metabolic Panel (14); Future  -     C-Reactive Protein; Future  -     Sed Rate, Westergren (Automated); Future  -     Protein/Creatinine Ratio, Urine Random; Future  -     Urinalysis, Routine [E]; Future    Long term (current) use of systemic steroids    Ds DNA antibody positive    Immunocompromised state due to drug therapy (HCC)    Other iron deficiency anemia    Other chest pain    Pericardial effusion (HCC)    Other neutropenia  -     Comp Metabolic Panel (14); Future  -     C-Reactive Protein; Future  -     Sed Rate, Westergren (Automated); Future  -     Protein/Creatinine Ratio, Urine Random; Future  -     Urinalysis, Routine [E]; Future    Proteinuria, unspecified type  -     Comp Metabolic Panel (14); Future  -     C-Reactive Protein; Future  -     Sed Rate, Westergren (Automated); Future  -     Protein/Creatinine Ratio, Urine Random; Future  -     Urinalysis, Routine [E]; Future    Inflammatory arthritis  -     predniSONE 5 MG Oral Tab; Take 1 tablet (5 mg total) by mouth  daily.    High risk medication use  -     azaTHIOprine 50 MG Oral Tab; Take 1 tablet (50 mg total) by mouth daily.                  Return in about 3 months (around 4/30/2025).  ?  HPI   Eleanor Martinez is a 29 year old female with the following active problems who recently transitioned care to me. She has a history of lupus (GURDEEP, dsDNA+) with joint pain, fatigue, pericarditis and pleuritis- on cellcept, benlysta, prednisone and colchicine. She presents for follow up.     Since her last visit, she been doing well overall.   Denies increased joint pain. Still with left wrist discomfort but relates to how she drives  Denies significant knee pain  Denies skin rashes but has some itching of her palms. Denies dryness.   Had iron infusion recently.  Still feels her tongue can be peeling but is better overall with avoiding triggers.   Has not been as strict with her gluten exposure in diet.  Still staying active and not feeling worsened restriction    Continues with intermittent chest discomfort  + heartburn and shoulder pain. Even had pain when leaning back in dentist's office.   Last seen by GI in 2023 (EGD/cscope were normal). Feels her symptoms of pain in the shoulder relates to food, since to pain when not eating.     Had boil in groin, responded to abx. Pt is interested in laser therapy. Has issues with other forms of hair removal.     Denies recent oral ulcers (aside from tongue issues)   Can get some swelling in the feet, but not as much as prior months. Thinks affected by diet. Thinks also worsened when taking ibuprofen.     Feels aza has helped with left sided symptoms but having a different pressure over the right lower ribs. Feels almost friction in the area when yawning or similar movements. Has been bothering her. Feels different than her costochondritis. Somewhat similar to the hiccups but without the physical activity. (Overall stable)    Previously had increased shortness of breath- still present and  does not think drastically changed. Still able to exercise regularly. Is conscious of adjusting work out if HR>150. (Overall stable)    Now has plans for appt with interventional cardiology.   Followed with pulmonology and cardiology. Was being evaluated for pulmonary hypertension vs pericardial effusion. Seen at [facility] previously, and told unlikely symptoms related to heart. Still hasn't received any call for the review ECHOs. Never told to get CT of heart and coronary arteries as well as heart monitor but there was no follow up.      Prior neuropsych eval and dx with ADD- started on vyvanse but did not like how she felt. Then started adderall, taking as needed due to concern for heart.       Denies skin rashes   Denies cough or hemoptysis   Denies current dry eyes, wearing glasses more   Denies dry mouth  Denies photosensitivity.     Currently taking:  Plaquenil 200mg daily- last eye exam in Feb 2024 ([facility]- new provider; told everything normal) - decreased since her last visit.   Azathioprine 50mg daily - denies obvious side effects   Prednisone 5mg daily  Colchicine stopped almost one full year   Last Benlysta was in the summer 2023  Stopped PPI since not sure if helping.   But does taking magnesium prn and vit c, stopped protein powder.     HPI from initial consultation  referred for rheumatologic evaluation due to second opinion regarding her lupus.     In 2018, she first started experiencing some symptoms.   Had seen a new PCP- and had blood testing which showed some leukopenia and anemia but since pt wasn't symptomatic, nothing done initially.  Had some chest pain and initially thought related to heavy exercise. Ignored symptoms.  Had a rash that started over her abdomen that spread throughout the body and sparing the face. Had self-medicated with some antibacterial soap and herbal remedies, took about 2-3 months to resolve. There was some minimal scarring.  Then developed some pleurisy-  chest pain with associated shortness of breath. Had elevated ddimer- always negative for blood clots- had multiple CT and US. States 4th time that year, further testing was done.  Her brother was in medical school at that time and suggested further testing for lupus. Repeat testing was positive.   Had some facial swelling and throat swelling and left arm pain with lymph node enlargement by the breast.  Would get lymph nodes in the neck and pain with swallowing.   Chest pain was so severe, she could not walk up the steps  In 2019, was evaluated in ER and had ECHO and further testing which had established diagnosed with pericarditis.   Had episode of fainting prior to admission.   Started on plaquenil immediately. Started on IV steroids and eventually tapered down to 10mg of prednisone. Has not attempted to decrease further yet. Has been on 10mg of prednisone for several months.  Was started on Cellcept during the hospitalization. Was on medication until September of 2021 due to insurance coverage.   Has been on colchicine since 2020 due to persistent chest pain without the shortness of breath. Felt like she could not work out without some sort of compression. ECHOs and CT scans have shown improvement.   Colchicine has helped.   Recently had Benlysta added to regimen last month since cellcept not approved.   Is doing self-injection with the Benlysta but only did one dose so far.     Denies significant joint pain except the bottom of the feet/ankles.   States the week prior to her menstrual cycle, she has significant pain in shoulders and bottom of feet, fatigued as well as swelling in her fingers. Cannot wear rings around her cycle due to the swelling.     Was seeing pulmonology- Dr. Perera and has not followed up due to the pandemic     + feel some hair thinning at the corners of her scalp; is also coming out in clumps.   + fatigue with sun exposure   Recent blood counts have been normal   Denies difficulty swallowing  now or sore throat but can still get some discomfort with \"flares\" which resolves with gargling with apple cider vinegar and salt.   + diarrhea which she attributes to colchicine   + achilles tendon pain  + dry eyes, not severe and occasional per pt. Attributes to working on computer all day. Does not think severe enough to need eye drop  + neck and upper/mid back pain since hospitalization. Can radiate to the shoulder but typically has the chest pain at the same time.     The patient denies recurrent oral or nasal ulcers, photosensitive rash, elevated or scarring rashes, Raynaud's phenomenon, prior renal or liver disease, or history of seizures.  No history of prior blood clot in the legs or lungs, strokes or ischemic phenomenon. Never pregnant.   Denies nonhealing ulcers on the fingertips.  The patient denies any history of uveitis, crampy abdominal pain, constipation, diarrhea, bloody stools, nodular painful shin bruises, psoriatic lesions,or  spooning or pitting of the nails.  There are no symptoms of severe dry mouth or recurrent cavities.  No fevers, chills, night sweats, unexpected weight loss, easy bruising or bleeding.  Denies chronic sinus infections/disease or epistaxis.  Denies chronic cough or hemoptysis.     Family hx:   No known family hx of lupus or other autoimmune disease.       Past Medical History:  Past Medical History:    Abdominal pain    Allergic rhinitis    Anemia    Anxiety    Back pain    Belching    Bloating    Blood in the stool    Change in hair    Chest pain    Chest pain on exertion    Constipation    Diarrhea, unspecified    Easy bruising    Esophageal reflux    Fatigue    Flatulence/gas pain/belching    Food intolerance    Frequent use of laxatives    Headache disorder    Heartburn    Indigestion    Irregular bowel habits    Lupus (systemic lupus erythematosus) (HCC)    Menses painful    Pain in joints    Pleural effusion    Problems with swallowing    Not often only when drinking  large amounts of water worh medication    Shortness of breath    Sleep disturbance    Vomiting    Extreme cases    Wears glasses    Weight gain     Past Surgical History:  Past Surgical History:   Procedure Laterality Date    Colonoscopy       Family History:  Family History   Problem Relation Age of Onset    Diabetes Mother     Asthma Mother     Allergies Mother         fam hx     Social History:  Social History     Socioeconomic History    Marital status: Single   Tobacco Use    Smoking status: Never    Smokeless tobacco: Never   Vaping Use    Vaping status: Never Used   Substance and Sexual Activity    Alcohol use: Not Currently     Alcohol/week: 1.0 standard drink of alcohol     Types: 1 Glasses of wine per week     Comment: social    Drug use: No    Sexual activity: Never   Other Topics Concern    Caffeine Concern No    Weight Concern Yes    Exercise Yes     Social Drivers of Health      Received from [facility]    Housing Stability     Medications:  Outpatient Medications Marked as Taking for the 1/31/25 encounter (Office Visit) with Germaine Ross DO   Medication Sig Dispense Refill    predniSONE 5 MG Oral Tab Take 1 tablet (5 mg total) by mouth daily. 90 tablet 0    azaTHIOprine 50 MG Oral Tab Take 1 tablet (50 mg total) by mouth daily. 90 tablet 0    FEROSUL 325 (65 Fe) MG Oral Tab TAKE 1 TABLET BY MOUTH TWICE DAILY WITH MEALS 180 tablet 0    amphetamine-dextroamphetamine ER 10 MG Oral Capsule SR 24 Hr Take 1 capsule (10 mg total) by mouth every morning.      hydroxychloroquine 200 MG Oral Tab Take 1 tablet (200 mg total) by mouth 2 (two) times daily. 180 tablet 0    ERGOCALCIFEROL 1.25 MG (99722 UT) Oral Cap TAKE 1 CAPSULE BY MOUTH 1 TIME A WEEK 12 capsule 0    betamethasone dipropionate 0.05 % External Lotion APPLY TO THE AFFECTED AREA OF RIGHT SCALP TWICE DAILY       Modified Medications    Modified Medication Previous Medication    AZATHIOPRINE 50 MG ORAL TAB azaTHIOprine 50 MG Oral  Tab       Take 1 tablet (50 mg total) by mouth daily.    Take 1 tablet (50 mg total) by mouth daily.    PREDNISONE 5 MG ORAL TAB predniSONE 5 MG Oral Tab       Take 1 tablet (5 mg total) by mouth daily.    Take 1 tablet (5 mg total) by mouth daily.     Medications Discontinued During This Encounter   Medication Reason    predniSONE 5 MG Oral Tab     azaTHIOprine 50 MG Oral Tab              ?  ?  Allergies:  No Known Allergies  ?  REVIEW OF SYSTEMS   ?  Review of Systems   Constitutional:  Positive for malaise/fatigue. Negative for chills and fever.   Eyes:  Negative for pain and redness.   Respiratory:  Positive for shortness of breath (occasionally). Negative for cough and hemoptysis.    Cardiovascular:  Positive for palpitations (with exertion). Negative for chest pain and leg swelling.   Gastrointestinal:  Positive for constipation (relates to iron) and heartburn (leading up to menstrual). Negative for abdominal pain, blood in stool, diarrhea and nausea.   Genitourinary:  Negative for dysuria, frequency, hematuria and urgency.   Musculoskeletal:  Positive for joint pain. Negative for back pain, myalgias and neck pain.   Skin:  Negative for itching and rash.   Neurological:  Positive for weakness (generalized, stable). Negative for dizziness, tingling, focal weakness, seizures and headaches.   Endo/Heme/Allergies:  Does not bruise/bleed easily.        + easy scarring   Psychiatric/Behavioral:  Negative for depression. The patient is nervous/anxious (following with therapist). The patient does not have insomnia.      PHYSICAL EXAM   Today's Vitals:  Temperature Blood Pressure Heart Rate Resp Rate SpO2   Temp: 97.9 °F (36.6 °C) BP: 118/60 Pulse: 92 Resp: 16 SpO2: 99 %   ?  Current Weight Height BMI BSA Pain   Wt Readings from Last 1 Encounters:   01/31/25 160 lb (72.6 kg)    Height: 5' 3\" (160 cm) Body mass index is 28.34 kg/m². Body surface area is 1.76 meters squared.         Physical Exam  Vitals and nursing note  reviewed.   Constitutional:       General: She is not in acute distress.     Appearance: Normal appearance. She is well-developed. She is not diaphoretic.   HENT:      Head: Normocephalic.   Eyes:      General: No scleral icterus.     Extraocular Movements: Extraocular movements intact.      Conjunctiva/sclera: Conjunctivae normal.   Neck:      Vascular: No JVD.      Trachea: No tracheal deviation.   Cardiovascular:      Rate and Rhythm: Normal rate and regular rhythm.      Heart sounds: Normal heart sounds. No murmur heard.     No friction rub. No gallop.   Pulmonary:      Effort: Pulmonary effort is normal. No respiratory distress.      Breath sounds: Normal breath sounds. No wheezing.   Musculoskeletal:         General: Deformity present. No swelling or tenderness.      Cervical back: Neck supple.      Comments: No evidence of heberden or andrew nodes of any of the fingers, no basilar joint tenderness of the 1st CMC bilaterally.  No swelling, tenderness, redness or restriction of motion of the DIPs, PIPs, MCPs, wrists, elbows, ankles, or joints of the feet.  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  Bilateral knees with medial joint line tenderness, no crepitus, no effusion. Trace swelling.  Swelling over top of feet b/l. - resolved; now trace swelling over ankles b/l    Lymphadenopathy:      Cervical: No cervical adenopathy.   Skin:     General: Skin is warm and dry.      Findings: No erythema or rash.      Comments: No periungal erythema  No malar rash  Nails manicured    Neurological:      Mental Status: She is alert and oriented to person, place, and time.      Cranial Nerves: No cranial nerve deficit.      Gait: Gait normal.   Psychiatric:         Mood and Affect: Mood normal.         Behavior: Behavior normal.       ?  Radiology review:       Narrative   PROCEDURE:  CT ANGIOGRAPHY, CHEST (CPT=71275)     COMPARISON:  [place]  [facility], CT ANGIOGRAPHY CHEST (CPT=71275), 7/25/2023, 6:47 AM.     INDICATIONS:  R07.89 Other chest pain R06.02 Shortness of breath M32.12 Systemic lupus erythematosus (SLE) with pericarditis, unspecified SLE type (HCC)     TECHNIQUE:  IV contrast-enhanced multislice CT angiography is performed through the pulmonary arterial anatomy. 3D volume renderings are generated.  Dose reduction techniques were used. Dose information is transmitted to the ACR (American College of  Radiology) NRDR (National Radiology Data Registry) which includes the Dose Index Registry.     PATIENT STATED HISTORY:(As transcribed by Technologist)  Patient states to have ongoing intermittent shortness of breath and chest tightness. She has a history of SLE with pericarditis.      CONTRAST USED:  105cc of Isovue 370     FINDINGS:    VASCULATURE:  No visible pulmonary arterial thrombus or attenuation.    THORACIC AORTA:  No aneurysm or visible dissection.    LUNGS:  No focal consolidation.  No pneumothorax.  The central airways are patent.  MEDIASTINUM:  There is a mildly prominent 9 mm prevascular node (series 5, image 72), which is unchanged from prior.  No new or enlarging mediastinal lymphadenopathy.  BETZY:  No mass or adenopathy.    CARDIAC:  There is stable mild cardiomegaly.  There is a small pericardial effusion.  PLEURA:  No mass or effusion.    CHEST WALL:  No mass or axillary adenopathy.    LIMITED ABDOMEN:  No acute findings.  BONES:  No bony lesion or fracture.    OTHER:  Negative.           Impression   CONCLUSION:    1. Small pericardial effusion and mild cardiomegaly, similar to prior.  2. Otherwise no acute findings or significant interval change.  See body of the report for further details.        LOCATION:  [facility]        Dictated by (CST): Rickey Blanco MD on 2/16/2024 at 2:46 PM      Finalized by (CST): Rickey Blanco MD on 2/16/2024 at 2:51 PM       DATE OF SERVICE: 07.15.2020     CT CHEST(CONTRAST ONLY) (CPT=71260)    CLINICAL INDICATION: Localized enlarged lymph nodes.   COMPARISON STUDY: [facility] 9/16/2019.   TECHNIQUE: Helical images of the chest were obtained from the thoracic inlet through the adrenal   glands. The data was reconstructed into 1.25 mm collimated axial images.   CONTRAST: 80 mL Isovue 370   Automated exposure control and ALARA manual techniques for patient specific dose reduction were   followed while maintaining the necessary diagnostic image quality.   ADVERSE REACTION: None.   FINDINGS:     HEART/AORTA: Heart size is normal. There is marked improvement in previously noted pericardial   effusion. Pericardial fluid now measures approximately 4 mm in thickness anterior to the right   ventricle. Previously it measured up to 2.4 cm adjacent to the left ventricle. The thoracic aorta is   normal in caliber.   MEDIASTINUM/BETZY: There is interval decrease in size of mediastinal and hilar lymph nodes since the   prior study. Prevascular lymph node now measures 6 mm compared to 1 cm on the prior study. Right   hilar lymph node measures 8 mm compared to 1.5 cm on the prior study.   LUNGS AND AIRWAYS: There is interval resolution of consolidation/atelectasis involving the left lung   lingula and lower lobe. There are no new areas of consolidation in the lungs. There is a 2 mm nodule   in the right lung upper lobe on image 76.   PLEURA: There is no pleural effusion on the current study.   CHEST WALL, AXILLA, AND LOWER NECK: Mildly prominent bilateral axillary lymph nodes with fatty betzy   have slightly decreased in size. Left axillary lymph node measures 1.7 x 1.1 cm compared to 2.3 x   1.6 cm on the prior exam.   UPPER ABDOMEN: Unremarkable   OSSEOUS STRUCTURES: There are no aggressive osseous lesions.   Impression   IMPRESSION:   1. Interval improvement in pericardial effusion and left pleural effusion   2. Interval resolution of consolidation/atelectasis involving the left lung lingula and lower  lobe   3. Interval decrease in size of mediastinal hilar and axillary lymph nodes   4. A 2 mm nodule in the right lung upper lobe. Per the Fleischner Society Guidelines (Radiology   2017), for patients at low risk for lung cancer no further imaging follow-up is suggested. For   patients at higher risk, such as smokers, a follow-up chest CT in 12 months is suggested. In the   setting of known malignancy or an immunocompromise patient, follow-up chest CT in 3-6 months is   suggested.     PROCEDURE:  CT CHEST+ABDOMEN+PELVIS(ALL [facility] ONLY)(CPT=71260/63887)       COMPARISON:  [facility] , CT ANGIOGRAPHY, CHEST (CPT=71275), 12/20/2018, 21:04.  [facility] , CT ANGIOGRAPHY, CHEST (CPT=71275), 5/24/2019, 21:25.       INDICATIONS:  M79.10 Myalgia, unspecified site       TECHNIQUE:  IV contrast-enhanced scanning through the chest, abdomen, and pelvis was performed.  Dose reduction techniques were used. Dose information is transmitted to the ACR (American College of Radiology) NRDR (National Radiology Data Registry) which    includes the Dose Index Registry.       PATIENT STATED HISTORY:(As transcribed by Technologist)  Patient states she had pleurisy in October with swollen lymphnodes in upper axillary chest. Patient now has pain to left chest with a deep breath.        CONTRAST USED:  89cc of Omnipaque 350       FINDINGS:         CHEST:         LUNGS/PLEURA:  No active or acute process seen.  No pulmonary mass or nodule.  Minimal dependent atelectasis left lower lobe.  No pleural effusion.       THORACIC AORTA:  No aneurysm or other acute process       MEDIASTINUM/BETZY:  Mild enlarged hilar lymph nodes including on the right on image 35 a 13 mm lymph node transverse dimension, stable as remeasured by myself.       CARDIAC:  Unremarkable.       CHEST WALL:  Unremarkable.       OTHER:  Enlarged bilateral axillary lymph nodes.  Showing no change when compared with a very recent CT exam of the chest from 5/24/2019, the largest lymph  node as remeasured myself, stable in size between the current on the prior exam, 27 x 18 mm series    2, image 22 and 23 current exam, series 4, image 64 prior exam.  Additional bilateral enlarged lymph nodes are also stable.       ABDOMEN/PELVIS:       LIVER:  Unremarkable.       BILIARY:  Unremarkable.       PANCREAS:  Unremarkable.       SPLEEN:  No splenomegaly.       KIDNEYS:  No acute abnormality.       ADRENALS:  Unremarkable.       AORTA/VASCULAR:  No aortic aneurysm.       RETROPERITONEUM:  Numerous retroperitoneal lymph nodes are seen, most of which are sub cm, with 1 on series 2, image 156 just to the left of the left common iliac artery measuring 11 x 11 mm.  Another lymph node to the right of the iliac bifurcation on   image 173 measures 13 x 12 mm.       BOWEL/MESENTERY:  No acute process.       ABDOMINAL WALL:  Unremarkable.       URINARY BLADDER:  Unremarkable.       PELVIC NODES:  Pelvic sidewall enlarged lymph nodes including on the right image 191 measuring 14 x 24 mm and on the left measuring 11 x 19 mm image 194.       PELVIC ORGANS:  Heterogeneous appearance of the uterus, with a heterogeneous mass outside of the endometrial cavity, distorting the endometrial cavity measuring 6.6 cm, most typical for uterine fibroid.  Left adnexal cyst likely of ovarian origin maximum    dimension 3.2 cm, and medial posterior right adnexal cystic mass also likely of ovarian origin 2.1 cm.  Trace free pelvic fluid.         OTHER:  Scattered mildly enlarged inguinal lymph nodes..       SKELETAL STRUCTURES IN THE CHEST/ABDOMEN/PELVIS:       BONES:  No acute abnormality.           Impression   CONCLUSION:         Enlarged lymph nodes in the chest, abdomen, pelvis including axilla, to a lesser extent right hilum, with mild enlarged lymph nodes in the lower retroperitoneum along the iliac arteries, subcentimeter retroperitoneal lymph nodes, and then enlarged pelvic    sidewall lymph nodes and mildly enlarged inguinal  lymph nodes.  The spleen does not appear enlarged.  No pleural effusion, ascites or pneumonia.  These lymph nodes are indeterminate, could reflect inflammatory etiology, with lymphoma also considered.     Consider biopsy of accessible lymph nodes.       Uterine fibroid.  Bilateral adnexal cystic lesions are most likely ovarian cyst.  Consider obtaining pelvic ultrasound to better assess these pelvic abnormalities.           Dictated by: Roosevelt Saldivar MD on 6/08/2019 at 14:27       Approved by: Roosevelt Saldivar MD            Labs:  Lab Results   Component Value Date    WBC 2.9 (L) 01/24/2025    RBC 4.02 01/24/2025    HGB 11.5 (L) 01/24/2025    HCT 35.2 01/24/2025    .0 01/24/2025    MCV 87.6 01/24/2025    MCH 28.6 01/24/2025    MCHC 32.7 01/24/2025    RDW 14.8 01/24/2025    NEPRELIM 1.38 (L) 01/24/2025    NEPERCENT 47.5 01/24/2025    LYPERCENT 37.1 01/24/2025    MOPERCENT 11.7 01/24/2025    EOPERCENT 2.7 01/24/2025    BAPERCENT 1.0 01/24/2025    NE 1.38 (L) 01/24/2025    LYMABS 1.08 01/24/2025    MOABSO 0.34 01/24/2025    EOABSO 0.08 01/24/2025    BAABSO 0.03 01/24/2025     Lab Results   Component Value Date    GLU 69 (L) 10/05/2024    BUN 12 10/05/2024    BUNCREA 9.0 (L) 05/11/2024    CREATSERUM 0.90 10/05/2024    ANIONGAP 10 10/05/2024    GFRNAA 85 07/29/2022    GFRAA 98 07/29/2022    CA 9.5 10/05/2024    OSMOCALC 288 10/05/2024    ALKPHO 53 10/05/2024    AST 14 10/05/2024    ALT 8 (L) 10/05/2024    BILT 0.3 10/05/2024    TP 7.5 12/23/2024    ALB 3.74 (L) 12/23/2024    GLOBULIN 3.6 (H) 10/05/2024     10/05/2024    K 3.6 10/05/2024     10/05/2024    CO2 25.0 10/05/2024     2D ECHO 01/2020  Conclusions:     1. Left ventricle: The cavity size was normal. Systolic function was normal.      The estimated ejection fraction was 55%.   2. Pericardium, extracardiac: A trivial to small pericardial effusion was      identified. There was no evidence of hemodynamic compromise. Pericardial      effusion size  appears to be less compared to previous echocardiogram.     Impressions:  Compared to the previous study, these findings represent   improvement.     Additional Labs:  01/2025  CBC with WBC 2.9; Hgb 11.5; plt 364  UA with trace blood; 70 protein  IgA 523.8 elevated  IgG 2207 elevated  IgM 98.4 normal  Random urine protein 56.4; urine protein to creatinine ratio 0.10  dsDNA 160  Ferritin 524 elevated  C3 88.8 normal  C4 14.6 normal  CMP, ESR, CRP not done    10/2024  CBC with WBC 2.8; Hgb 10.9; plt 364  CMP grossly normal   ESR 33 borderline   CRP normal    08/2024  CBC with WBC 4.1; Hgb 11.4; plt 367  CMP grossly normal   IgA 286.80 elevated  ESR 39 borderline   CRP normal    07/2024  CBC with WBC 3.8; Hgb 11.5; plt 339  UA grossly negative  Urine protein to creatinine ratio 0.05 normal  IgA 501.1 elevated  IgG 2215 elevated  IgM 97.6 normal   dsDNA 160 elevated   ESR 60 borderline   CRP 1.20 elevated    C4 15.1 normal  C3 86.4 normal   Ferritin 32.1 normal     04/2024  CBC with WBC 2.5; Hgb 10.7; plt 368  UA grossly negative  Urine protein to creatinine ratio 0.06 normal  IgA 388.10 elevated  IgG 1824 elevated  IgM 77 normal   dsDNA 160 elevated   ESR 40 borderline   CRP normal   C4 15 normal  C3 79.8 borderline low   Ferritin/iron studies  B12 821 normal  Vit D 59 normal.     02/2024  CMP grossly normal  C3 78 low  C4 16.1 normal  UA grossly negative  Urine protein to creatinine ratio 0.06 normal  Iron studies low normal  TPMT pending     01/2024  dsDNA 1: 640  CBC with WBC 2.9; Hgb 11.9; plt 288; MCV 85  IgA 404 elevated  IgG 1770 elevated  IgM 87.1  ESR 49 elevated  CRP 1.03 elevated  Ferritin 36.1    01/2024  CBC with WBC 2.9; Hgb 11.9; plt 288; MCV 85  IgA 404 elevated  IgG 1770 elevated  IgM 87.1  ESR 49 elevated  CRP 1.03 elevated  Ferritin 36.1      10/2023  CBC with WBC 4.1; hemoglobin 12.5; platelet 332; MCV 85.5  CMP grossly normal  Iron 51; percent sat 14 low  IgA 350 elevated  IgG 1530 normal  IgM 64.2  normal  Urine protein to creatinine ratio normal  ESR 43 elevated  CRP 1.0 elevated  C3 97.2 normal  C4 25.3 normal  B12 1111 elevated  Ferritin 34.9 normal  Vitamin D 24.8 Low   normal  Homocysteine 8.4 normal  dsDNA 1: 1280 high positive    07/2023  Ferritin normal  Iron 24; percent sat 7 low  Urine protein to creatinine ratio 0.11  UA grossly negative  dsDNA 1:640  C4 25.8 normal  C3 107 normal  ESR 35 borderline elevated  CRP 1.32 elevated  CMP grossly normal  CBC with WBC 4.1; hemoglobin 11.4; MCV 83.6; platelet 318    04/2023  GURDEEP screen positive  KAILEE panel with SSA >240  dsDNA 80 positive   Ferritin 12.1 borderline low  Iron 43; percent sat 12 both low  Vitamin D 49.9 normal  ESR 29 borderline elevated  CRP 0.39 borderline elevated  CMP grossly normal  CBC with WBC 3.4; hemoglobin 11.5; platelet 340    01/2023  SSA >240.0 positive  SSB, Brown, Nuha 1 RNP, RNP 70, centromere, SCL 70, Nuha 1 negative  Vitamin D 22.6 low  Urine protein to creatinine ratio 0.12 normal  UA with trace protein otherwise grossly negative  dsDNA IgG 117 positive  ESR 34 borderline elevated  CRP 0.80 borderline elevated  CMP grossly normal  CBC with WBC 4.0; Hgb 11.3; plt 308    10/2022  UA protein 30; small leuk esterase and few squamous cells otherwise negative  dsDNA 1:320  C4 24.8 normal  C3 106 normal  Vitamin D 25.6 low  ESR 31 elevated  CRP 0.33 borderline  CMP grossly normal  CBC with WBC 4.4; hemoglobin 11.9; platelet 375  Urine protein to creatinine ratio 0.14    07/2022  Ferritin 13.2 borderline low  Iron and percent saturation low  C4 24.8 normal  C3 111 normal  ESR 18 normal  CRP 0.68 borderline elevated  CMP grossly normal  CBC with WBC 3.6; hemoglobin 11.2; platelet 372  Antiphospholipid antibodies pending  dsDNA 1: 640    3/2022  Vit D 37.5  Iron and % sat low   Ferritin 14 normal   CBC with WBC 5.1 ; hemoglobin 11.5; MCV 83.6; platelet 369  CMP grossly normal  ESR 20 normal   CRP normal   DsDNA  1:1280    01/2022  CBC with WBC 7.4; hemoglobin 11.9; MCV 84.7; platelet 367  CMP grossly normal  CRP 1.04 elevated  ESR 25 borderline  dsDNA 1: 2560  C4 20.1 normal  C3 105 normal  Vitamin D 16.1 low  LAC negative  ACL negative  B2G IgM 9.7 equivocal; IgG negative  Urine protein creatinine ratio 0.16  UA negative for protein or blood (small leuk esterase)    06/2021  UA grossly normal  Smith negative  ESR 20  C4 18.6  C3 103  dsDNA 276  GURDEEP screen positive no titer  CMP grossly normal  CBC grossly normal    08/2021   QuantiFERON TB negative    04/2021  Vitamin D 28.4 low    09/2019   RIMMA virus negative  Smooth muscle antibody negative  Aldolase normal  Serum ACE normal  Lupus anticoagulant positive    QuantiFERON-TB indeterminant  Ferritin 235 elevated  Iron normal    Germaine Ross DO  [facility]  01/31/2025

## 2025-01-31 NOTE — PATIENT INSTRUCTIONS
-- updated labs and urine testing in 2 weeks   -- continue azathioprine 50mg daily (may consider increasing further depending on workup by other specialists)  -- continue lower dose of plaquenil based off weight   -- continue prednisone 5mg   -- continue to take iron and vitamin d daily  -- stay off colchicine and benlysta for now  -- remember yearly eye exams while on plaquenil   -- follow up in 3 months or sooner as needed  -- call with questions/concers in the meantime  -- we will be in communication with results when available     Dr. Ross

## 2025-03-26 NOTE — PROGRESS NOTES
Pt s/p RHC via right brachial vein. Pt received from cath lab with right brachial site soft and no bleeding noted. VSS. Pt awake and tolerating PO. After recovery completed, discharge instructions reviewed with patient, copy given and all questions answered. IV removed. Pt discharged via wheelchair to [facility], mother to drive patient home.

## 2025-03-26 NOTE — PROCEDURES
[facility]   part of [facility]    Cardiac Cath Procedure Note  Eleanor Martinez Patient Status:  Outpatient    1995 MRN [id number]   Location [facility] INTERVENTIONAL SUITES Attending Steph Cui MD   Hosp Day # 0 PCP Liana Zaidi DO       Cardiologist: Steph Cui MD  Primary Proceduralist: Steph Cui MD  Procedure Performed: RHC  Date of Procedure: 3/26/2025   Indication: Dyspnea    Consent:   Informed written consent was obtained from the patient after risk benefits and alternative explained the patient.  Patient agreed to proceed    Sedation  IV conscious sedation was achieved with Versed and fentanyl.  It was administered under my direct supervision.  Hemodynamic monitoring took place throughout the entire procedure by myself in the Cath Lab staff.  2 mg of Versed and 50 mcg of Fentanyl were given.  Start Time: 14:44 End Time: 15:00      Description of the procedure: After written informed consent was obtained from the patient, patient was brought to the cardiac catheterization laboratory in a stable condition and fasting state.  Patient was prepped and draped in the usual sterile fashion.  IV conscious sedation was achieved with Versed and fentanyl.  Right brachial  vein accessed by palpation and ultrasound.  7 FR sheath placed.  Monitoring swan advanced to RA and pressures recorded in RA, RV, PA and wedge positions under fluoroscopic and hemodynamic guidance.      Specimen sent to: No specimen collected  Estimated blood loss: 10 cc  Closure:  Manual pressure    Right heart catheterization:    RA  3 mmHg  RV 20/1/4 mm Hg  PA 19/9 mean 14 mmHg  PCW 7 mmHg    CO 7.2 L/min / CI 3.8 L/min/m2  PVR 1 YBARRA  PA Sats 74%    Assessment:  Normal PA pressure and LV filling pressure      Recommendations:  Routine post cath care  Findings discussed in detail with patient      Steph Cui MD  25

## 2025-03-26 NOTE — INTERVAL H&P NOTE
Pre-op Diagnosis: * No pre-op diagnosis entered *    The above referenced H&P was reviewed by Steph Cui MD on 3/26/2025, the patient was examined and no significant changes have occurred in the patient's condition since the H&P was performed.  I discussed with the patient and/or legal representative the potential benefits, risks and side effects of this procedure; the likelihood of the patient achieving goals; and potential problems that might occur during recuperation.  I discussed reasonable alternatives to the procedure, including risks, benefits and side effects related to the alternatives and risks related to not receiving this procedure.  We will proceed with procedure as planned.    Patient was consented for right heart catheterization. The indications, risks, benefits and alternatives of the procedure were explained to the patient. The risk include, but are not limited to stroke, pulmonary damage, pulmonary hemorrhage, death, heart attack, hematoma, bleeding, allergic reaction to medications, vascular damage requiring surgical repair, dialysis and others. Patient is AAO x 3 and wishes to proceed.

## 2025-04-29 NOTE — PROGRESS NOTES
RHEUMATOLOGY FOLLOW UP   Date of visit: 04/29/2025  ?  Chief Complaint   Patient presents with    SLE     3 month f/u. Having the same symptoms, but they are occurring more often. More swelling, fatigue, and heart burn issues. Converted rapid score of 4.3     ASSESSMENT, DISCUSSION & PLAN   Assessment:  1. Systemic lupus erythematosus (SLE) with pericarditis, unspecified SLE type (HCC)    2. Elevated sed rate    3. Long term (current) use of systemic steroids    4. Ds DNA antibody positive    5. Immunocompromised state due to drug therapy (HCC)    6. Other chest pain    7. Pericardial effusion (HCC)    8. Proteinuria, unspecified type    9. Inflammatory arthritis    10. High risk medication use    11. Shortness of breath    12. Finger swelling    13. Low serum complement C3    14. Low serum complement C4    15. Other neutropenia              Discussion:  Ms. Eleanor Martinez is a 29 yo woman who was otherwise healthy, until she was diagnosed with lupus in 2019. She has had pericarditis, pleuritis as well as fatigue and oral ulcers. Chart review reveals significant diffuse lymphadenopathy at time of diagnosis. She was initially placed on steroids and mycophenolate. Had colchicine added to her regimen due to persistent chest pain. While she get some shortness of breath and pains, they do not seem related to lupus. And has been told at one point by pulm likely more neuro-muscle related. Previously discussed that some of her depression/anxiety may be contributing to some of her symptoms.     At her prior visit, she had been feeling worse- more joint pain, stiffness/swelling, increased rib pain and fatigue. She does seem to have some food intolerance and seems to be eating more gluten than she intended to.  She is also following with both cardiology and pulmonology. Earlier this year, her CTA of the chest showed normal lungs but some cardiomegaly and small pericardial effusion which per the report are stable..  Spoke to pulm and after his personal review of CTA, there is concern for pulm artery enlargement.   Was seen at [facility], so having to go through some work up prior to getting RHC- however pt reports not being notified of next steps and did not follow through  Given the above, decision was made to change MMF to AZA at a prior visit. She has been tolerating low dose of 50mg daily without obvious side effects. Her inflammatory markers are the lowest they have been in some time.   However, patient has been feeling worse over the past several weeks.  Reports increased back pain, joint pain, fluid retention/swelling, some worsened chest pain and shortness of breath, the latter even affecting her sleep.  Denies any significant changes in life aside from new job but does not feel increased risk for it.  Still unclear of what to do for her diet but denies any major changes aside from eating more  food.  On exam she has clear signs of synovitis of her left middle finger PIP and has clear signs of worsening bilateral leg edema.  Urine studies ordered in office.  Depo shot in office.  Will increase azathioprine to 75 mg and monitor for improvement.  Discussed how I also wonder if mood disorders contributing to some of her symptoms.  She was recently seen by cardiology and her right heart cath was negative for pulmonary hypertension.  I did encourage her to follow back up with pulmonology to discuss this further.    -- depomedrol injection in office  -- start prednisone 20mg daily x 1 week then 10mg daily x 1 week  -- after the higher dose prednisone, get updated labs  -- urine testing in office today  -- increase azathioprine to 75mg daily  -- we'll be in touch in about two weeks to see how you're feeling  -- if the change in lupus meds doesn't help, consider low dose antidepressant to help with pain (look into duloxetine vs amitriptyline)   -- continue follow up every 3 months  -- will be in communication in the  meantime    Previously discussed the risks and benefits of the use of azathioprine at length, including increased risk of infection and malignancy. Azathioprine can cause leukopenia and change blood counts, as well as cause a change in the liver enzymes. Initially, azathioprine can result in upset stomach as well. Eleanor Martinez understands this risk, and agrees that the benefits outweigh this risk for her systemic inflammatory disease.    Patient verbalized understanding of above instructions. No further questions at this time.    LOS based off complexity of care and decision making        Plan:  Diagnoses and all orders for this visit:    Systemic lupus erythematosus (SLE) with pericarditis, unspecified SLE type (HCC)  -     UA/M With Culture Reflex [E]; Future  -     Protein/Creatinine Ratio, Urine Random; Future  -     methylPREDNISolone acetate (DEPO-medrol) 40 MG/ML injection 40 mg  -     Sed Rate, Westergren (Automated); Future  -     CBC With Differential With Platelet; Future  -     Complement C3, Serum; Future  -     Complement C4, Serum; Future  -     dsDNA Antibody by IFA [E]; Future  -     hydroxychloroquine 200 MG Oral Tab; Take 1 tablet (200 mg total) by mouth 2 (two) times daily.  -     predniSONE 5 MG Oral Tab; Take 1 tablet (5 mg total) by mouth daily.    Elevated sed rate  -     UA/M With Culture Reflex [E]; Future  -     Protein/Creatinine Ratio, Urine Random; Future  -     Sed Rate, Westergren (Automated); Future  -     CBC With Differential With Platelet; Future  -     Complement C3, Serum; Future  -     Complement C4, Serum; Future  -     dsDNA Antibody by IFA [E]; Future    Long term (current) use of systemic steroids  -     UA/M With Culture Reflex [E]; Future  -     Protein/Creatinine Ratio, Urine Random; Future  -     Sed Rate, Westergren (Automated); Future  -     CBC With Differential With Platelet; Future  -     Complement C3, Serum; Future  -     Complement C4, Serum; Future  -      dsDNA Antibody by IFA [E]; Future    Ds DNA antibody positive  -     UA/M With Culture Reflex [E]; Future  -     Protein/Creatinine Ratio, Urine Random; Future  -     Sed Rate, Westergren (Automated); Future  -     CBC With Differential With Platelet; Future  -     Complement C3, Serum; Future  -     Complement C4, Serum; Future  -     dsDNA Antibody by IFA [E]; Future    Immunocompromised state due to drug therapy (HCC)  -     UA/M With Culture Reflex [E]; Future  -     Protein/Creatinine Ratio, Urine Random; Future  -     dsDNA Antibody by IFA [E]; Future    Other chest pain    Pericardial effusion (HCC)    Proteinuria, unspecified type  -     UA/M With Culture Reflex [E]; Future  -     Protein/Creatinine Ratio, Urine Random; Future    Inflammatory arthritis  -     predniSONE 5 MG Oral Tab; Take 1 tablet (5 mg total) by mouth daily.    High risk medication use    Shortness of breath    Finger swelling  -     methylPREDNISolone acetate (DEPO-medrol) 40 MG/ML injection 40 mg  -     dsDNA Antibody by IFA [E]; Future    Low serum complement C3  -     methylPREDNISolone acetate (DEPO-medrol) 40 MG/ML injection 40 mg  -     Sed Rate, Westergren (Automated); Future  -     CBC With Differential With Platelet; Future  -     Complement C3, Serum; Future  -     Complement C4, Serum; Future    Low serum complement C4  -     methylPREDNISolone acetate (DEPO-medrol) 40 MG/ML injection 40 mg  -     Sed Rate, Westergren (Automated); Future  -     CBC With Differential With Platelet; Future  -     Complement C3, Serum; Future  -     Complement C4, Serum; Future    Other neutropenia  -     CBC With Differential With Platelet; Future    Other orders  -     azaTHIOprine 75 MG Oral Tab; Take 1 tablet (75 mg total) by mouth daily.  -     predniSONE 10 MG Oral Tab; Take 20mg daily x 1 week then 10mg daily x 1 week, the resume prior 5mg daily                    Return in about 3 months (around 7/29/2025).  ?  HPI   Eleanor Martinez  is a 30 year old female with the following active problems who recently transitioned care to me. She has a history of lupus (GURDEEP, dsDNA+) with joint pain, fatigue, pericarditis and pleuritis- previously on cellcept, benlysta, prednisone and colchicine. She presents for follow up.     Since her last visit, she hasn't been feeling great.  Feels worsened fatigue and finding it harder to complete tasks compared to before.  States her sleep quality is worse which she attributes to both pain and breathing.  Increased lower back pain and having to move at night due to discomfort.   Increased chest discomfort. Underwent RHC and told no pulmonary hypertension. Pt still feels short of breath, that happens even with repositioning. Has not seen pulmonology recently.  Has noticed increased swelling and weight gain of 2-3# intermittently. Typically in the hands/feet but much more amplified.   Denies urinary changes.   Denies dietary changes. Continues to avoid soy sauce. Increased  based diet.     Hard to tell if rashes present but feels her face is getting redder and warm to touch- once earlier this year, has not occurred since that time  + increased oral sores on her tongue but cannot connect to food triggers.   + heartburn and shoulder pain, stable  Last seen by GI in 2023 (EGD/cscope were normal). Previously said that her symptoms of pain in the shoulder relates to food, since to pain when not eating.     Denies nasal ulcers  Denies raynauds  Denies new joint pain except lower back.   Denies recent elevations of her HR over 150 like before. Able to exercise with some limitations from her chest pain but not changed.   Denies cough or hemoptysis   Denies current dry eyes, wearing glasses more   Denies dry mouth  Denies photosensitivity.     Changed jobs- now in legal department, does not feel more stress with change       Previously stated aza has helped with left sided symptoms but having a different pressure over the  right lower ribs. Feels almost friction in the area when yawning or similar movements. Has been bothering her. Feels different than her costochondritis. Somewhat similar to the hiccups but without the physical activity. (Overall stable)    Prior neuropsych eval and dx with ADD- started on vyvanse but did not like how she felt. Then started adderall, taking as needed due to concern for heart.       Currently taking:  Plaquenil 200mg alternating with 400mg daily- last eye exam in Feb 2024 ([facility]- new provider; told everything normal) - decreased since her last visit.   Azathioprine 50mg daily - denies obvious side effects   Prednisone 5mg daily  Colchicine stopped over one year   Last Benlysta was in the summer 2023  Stopped PPI since not sure if helping.   Suppplements: magnesium prn and vit c, oral iron     HPI from initial consultation  referred for rheumatologic evaluation due to second opinion regarding her lupus.     In 2018, she first started experiencing some symptoms.   Had seen a new PCP- and had blood testing which showed some leukopenia and anemia but since pt wasn't symptomatic, nothing done initially.  Had some chest pain and initially thought related to heavy exercise. Ignored symptoms.  Had a rash that started over her abdomen that spread throughout the body and sparing the face. Had self-medicated with some antibacterial soap and herbal remedies, took about 2-3 months to resolve. There was some minimal scarring.  Then developed some pleurisy- chest pain with associated shortness of breath. Had elevated ddimer- always negative for blood clots- had multiple CT and US. States 4th time that year, further testing was done.  Her brother was in medical school at that time and suggested further testing for lupus. Repeat testing was positive.   Had some facial swelling and throat swelling and left arm pain with lymph node enlargement by the breast.  Would get lymph nodes in the neck and pain  with swallowing.   Chest pain was so severe, she could not walk up the steps  In 2019, was evaluated in ER and had ECHO and further testing which had established diagnosed with pericarditis.   Had episode of fainting prior to admission.   Started on plaquenil immediately. Started on IV steroids and eventually tapered down to 10mg of prednisone. Has not attempted to decrease further yet. Has been on 10mg of prednisone for several months.  Was started on Cellcept during the hospitalization. Was on medication until September of 2021 due to insurance coverage.   Has been on colchicine since 2020 due to persistent chest pain without the shortness of breath. Felt like she could not work out without some sort of compression. ECHOs and CT scans have shown improvement.   Colchicine has helped.   Recently had Benlysta added to regimen last month since cellcept not approved.   Is doing self-injection with the Benlysta but only did one dose so far.     Denies significant joint pain except the bottom of the feet/ankles.   States the week prior to her menstrual cycle, she has significant pain in shoulders and bottom of feet, fatigued as well as swelling in her fingers. Cannot wear rings around her cycle due to the swelling.     Was seeing pulmonology- Dr. Perera and has not followed up due to the pandemic     + feel some hair thinning at the corners of her scalp; is also coming out in clumps.   + fatigue with sun exposure   Recent blood counts have been normal   Denies difficulty swallowing now or sore throat but can still get some discomfort with \"flares\" which resolves with gargling with apple cider vinegar and salt.   + diarrhea which she attributes to colchicine   + achilles tendon pain  + dry eyes, not severe and occasional per pt. Attributes to working on computer all day. Does not think severe enough to need eye drop  + neck and upper/mid back pain since hospitalization. Can radiate to the shoulder but typically has the  chest pain at the same time.     The patient denies recurrent oral or nasal ulcers, photosensitive rash, elevated or scarring rashes, Raynaud's phenomenon, prior renal or liver disease, or history of seizures.  No history of prior blood clot in the legs or lungs, strokes or ischemic phenomenon. Never pregnant.   Denies nonhealing ulcers on the fingertips.  The patient denies any history of uveitis, crampy abdominal pain, constipation, diarrhea, bloody stools, nodular painful shin bruises, psoriatic lesions,or  spooning or pitting of the nails.  There are no symptoms of severe dry mouth or recurrent cavities.  No fevers, chills, night sweats, unexpected weight loss, easy bruising or bleeding.  Denies chronic sinus infections/disease or epistaxis.  Denies chronic cough or hemoptysis.     Family hx:   No known family hx of lupus or other autoimmune disease.       Past Medical History:  Past Medical History:    Abdominal pain    Allergic rhinitis    Anemia    Anxiety    Back pain    Belching    Bloating    Blood in the stool    Change in hair    Chest pain    Chest pain on exertion    Constipation    Diarrhea, unspecified    Easy bruising    Esophageal reflux    Fatigue    Flatulence/gas pain/belching    Food intolerance    Frequent use of laxatives    Headache disorder    Heartburn    Indigestion    Irregular bowel habits    Lupus (systemic lupus erythematosus) (HCC)    Menses painful    Pain in joints    Pleural effusion    Problems with swallowing    Not often only when drinking large amounts of water worh medication    Shortness of breath    Sleep disturbance    Vomiting    Extreme cases    Wears glasses    Weight gain     Past Surgical History:  Past Surgical History:   Procedure Laterality Date    Colonoscopy       Family History:  Family History   Problem Relation Age of Onset    Diabetes Mother     Asthma Mother     Allergies Mother         fam hx     Social History:  Social History     Socioeconomic History     Marital status: Single   Tobacco Use    Smoking status: Never    Smokeless tobacco: Never   Vaping Use    Vaping status: Never Used   Substance and Sexual Activity    Alcohol use: Yes     Alcohol/week: 1.0 standard drink of alcohol     Types: 1 Glasses of wine per week     Comment: very minimal    Drug use: No    Sexual activity: Never   Other Topics Concern    Caffeine Concern No    Weight Concern Yes    Exercise Yes     Social Drivers of Health      Received from [facility]    Housing Stability     Medications:  Outpatient Medications Marked as Taking for the 4/29/25 encounter (Office Visit) with Germaine Ross DO   Medication Sig Dispense Refill    azaTHIOprine 75 MG Oral Tab Take 1 tablet (75 mg total) by mouth daily. 90 tablet 0    hydroxychloroquine 200 MG Oral Tab Take 1 tablet (200 mg total) by mouth 2 (two) times daily. 180 tablet 0    predniSONE 5 MG Oral Tab Take 1 tablet (5 mg total) by mouth daily. 90 tablet 0    predniSONE 10 MG Oral Tab Take 20mg daily x 1 week then 10mg daily x 1 week, the resume prior 5mg daily 21 tablet 0    FEROSUL 325 (65 Fe) MG Oral Tab TAKE 1 TABLET BY MOUTH TWICE DAILY WITH MEALS (Patient taking differently: Take 1 tablet (325 mg total) by mouth daily with breakfast.) 180 tablet 0    amphetamine-dextroamphetamine ER 10 MG Oral Capsule SR 24 Hr Take 1 capsule (10 mg total) by mouth daily as needed.      ERGOCALCIFEROL 1.25 MG (33914 UT) Oral Cap TAKE 1 CAPSULE BY MOUTH 1 TIME A WEEK 12 capsule 0     Current Facility-Administered Medications for the 4/29/25 encounter (Office Visit) with Germaine Ross DO   Medication Dose Route Frequency Provider Last Rate Last Admin    methylPREDNISolone acetate (DEPO-medrol) 40 MG/ML injection 40 mg  40 mg Intramuscular Once          Modified Medications    Modified Medication Previous Medication    HYDROXYCHLOROQUINE 200 MG ORAL TAB hydroxychloroquine 200 MG Oral Tab       Take 1 tablet (200 mg total) by mouth 2 (two)  times daily.    Take 1 tablet (200 mg total) by mouth 2 (two) times daily.    PREDNISONE 5 MG ORAL TAB predniSONE 5 MG Oral Tab       Take 1 tablet (5 mg total) by mouth daily.    Take 1 tablet (5 mg total) by mouth daily.     Medications Discontinued During This Encounter   Medication Reason    azaTHIOprine 50 MG Oral Tab     hydroxychloroquine 200 MG Oral Tab     predniSONE 5 MG Oral Tab                ?  ?  Allergies:  No Known Allergies  ?  REVIEW OF SYSTEMS   ?  Review of Systems   Constitutional:  Positive for malaise/fatigue. Negative for chills, fever and weight loss.   Eyes:  Negative for pain and redness.   Respiratory:  Positive for shortness of breath (more often). Negative for cough and hemoptysis.    Cardiovascular:  Positive for chest pain and leg swelling. Negative for palpitations (with exertion).   Gastrointestinal:  Positive for constipation (relates to iron, occasionally) and heartburn. Negative for abdominal pain, blood in stool, diarrhea, nausea and vomiting.   Genitourinary:  Negative for dysuria, frequency, hematuria and urgency.   Musculoskeletal:  Positive for back pain and joint pain. Negative for myalgias and neck pain.   Skin:  Positive for rash. Negative for itching.   Neurological:  Positive for tingling (if fingers swollen), weakness (generalized, stable) and headaches (every 2 weeks or so). Negative for dizziness, focal weakness and seizures.   Endo/Heme/Allergies:  Does not bruise/bleed easily.        + easy scarring   Psychiatric/Behavioral:  Positive for depression. The patient is nervous/anxious (following with therapist) and has insomnia (related to pain and breathing).      PHYSICAL EXAM   Today's Vitals:  Temperature Blood Pressure Heart Rate Resp Rate SpO2   Temp: 97.9 °F (36.6 °C) BP: 108/60 Pulse: 82 Resp: 16 SpO2: 99 %   ?  Current Weight Height BMI BSA Pain   Wt Readings from Last 1 Encounters:   04/29/25 165 lb (74.8 kg)    Height: 5' 3\" (160 cm) Body mass index is 29.23  kg/m². Body surface area is 1.78 meters squared.         Physical Exam  Vitals and nursing note reviewed.   Constitutional:       General: She is not in acute distress.     Appearance: Normal appearance. She is well-developed. She is not diaphoretic.   HENT:      Head: Normocephalic.   Eyes:      General: No scleral icterus.     Extraocular Movements: Extraocular movements intact.      Conjunctiva/sclera: Conjunctivae normal.   Neck:      Vascular: No JVD.      Trachea: No tracheal deviation.   Cardiovascular:      Rate and Rhythm: Normal rate and regular rhythm.      Heart sounds: Normal heart sounds. No murmur heard.     No friction rub. No gallop.   Pulmonary:      Effort: Pulmonary effort is normal. No respiratory distress.      Breath sounds: Normal breath sounds. No wheezing.   Musculoskeletal:         General: Swelling, tenderness and deformity present.      Cervical back: Neck supple.      Right lower leg: Edema present.      Left lower leg: Edema present.      Comments: No evidence of heberden or andrew nodes of any of the fingers, no basilar joint tenderness of the 1st CMC bilaterally.  Left middle finger PIP tender/swollen  No swelling, tenderness, redness or restriction of motion of the DIPs, remaining PIPs, MCPs, wrists, elbows, ankles, or joints of the feet.  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  Bilateral knees with medial joint line tenderness, no crepitus, no effusion. Trace swelling.  Swelling over top of feet b/l, significantly worse   Lymphadenopathy:      Cervical: No cervical adenopathy.   Skin:     General: Skin is warm and dry.      Findings: No erythema or rash.      Comments: No periungal erythema  No malar rash  Nails manicured    Neurological:      Mental Status: She is alert and oriented to person, place, and time.      Cranial Nerves: No cranial nerve deficit.      Gait: Gait normal.   Psychiatric:          Mood and Affect: Mood normal.         Behavior: Behavior normal.       ?  Radiology review:       Narrative   PROCEDURE:  CT ANGIOGRAPHY, CHEST (CPT=71275)     COMPARISON:  [facility], CT ANGIOGRAPHY CHEST (CPT=71275), 7/25/2023, 6:47 AM.     INDICATIONS:  R07.89 Other chest pain R06.02 Shortness of breath M32.12 Systemic lupus erythematosus (SLE) with pericarditis, unspecified SLE type (HCC)     TECHNIQUE:  IV contrast-enhanced multislice CT angiography is performed through the pulmonary arterial anatomy. 3D volume renderings are generated.  Dose reduction techniques were used. Dose information is transmitted to the ACR (American College of  Radiology) NRDR (National Radiology Data Registry) which includes the Dose Index Registry.     PATIENT STATED HISTORY:(As transcribed by Technologist)  Patient states to have ongoing intermittent shortness of breath and chest tightness. She has a history of SLE with pericarditis.      CONTRAST USED:  105cc of Isovue 370     FINDINGS:    VASCULATURE:  No visible pulmonary arterial thrombus or attenuation.    THORACIC AORTA:  No aneurysm or visible dissection.    LUNGS:  No focal consolidation.  No pneumothorax.  The central airways are patent.  MEDIASTINUM:  There is a mildly prominent 9 mm prevascular node (series 5, image 72), which is unchanged from prior.  No new or enlarging mediastinal lymphadenopathy.  BETZY:  No mass or adenopathy.    CARDIAC:  There is stable mild cardiomegaly.  There is a small pericardial effusion.  PLEURA:  No mass or effusion.    CHEST WALL:  No mass or axillary adenopathy.    LIMITED ABDOMEN:  No acute findings.  BONES:  No bony lesion or fracture.    OTHER:  Negative.           Impression   CONCLUSION:    1. Small pericardial effusion and mild cardiomegaly, similar to prior.  2. Otherwise no acute findings or significant interval change.  See body of the report for further details.        LOCATION:  [facility]        Dictated by  (CST): Rickey Blanco MD on 2/16/2024 at 2:46 PM      Finalized by (CST): Rickey Blanco MD on 2/16/2024 at 2:51 PM       DATE OF SERVICE: 07.15.2020     CT CHEST(CONTRAST ONLY) (CPT=71260)   CLINICAL INDICATION: Localized enlarged lymph nodes.   COMPARISON STUDY: [facility] 9/16/2019.   TECHNIQUE: Helical images of the chest were obtained from the thoracic inlet through the adrenal   glands. The data was reconstructed into 1.25 mm collimated axial images.   CONTRAST: 80 mL Isovue 370   Automated exposure control and ALARA manual techniques for patient specific dose reduction were   followed while maintaining the necessary diagnostic image quality.   ADVERSE REACTION: None.   FINDINGS:     HEART/AORTA: Heart size is normal. There is marked improvement in previously noted pericardial   effusion. Pericardial fluid now measures approximately 4 mm in thickness anterior to the right   ventricle. Previously it measured up to 2.4 cm adjacent to the left ventricle. The thoracic aorta is   normal in caliber.   MEDIASTINUM/BETZY: There is interval decrease in size of mediastinal and hilar lymph nodes since the   prior study. Prevascular lymph node now measures 6 mm compared to 1 cm on the prior study. Right   hilar lymph node measures 8 mm compared to 1.5 cm on the prior study.   LUNGS AND AIRWAYS: There is interval resolution of consolidation/atelectasis involving the left lung   lingula and lower lobe. There are no new areas of consolidation in the lungs. There is a 2 mm nodule   in the right lung upper lobe on image 76.   PLEURA: There is no pleural effusion on the current study.   CHEST WALL, AXILLA, AND LOWER NECK: Mildly prominent bilateral axillary lymph nodes with fatty betzy   have slightly decreased in size. Left axillary lymph node measures 1.7 x 1.1 cm compared to 2.3 x   1.6 cm on the prior exam.   UPPER ABDOMEN: Unremarkable   OSSEOUS STRUCTURES: There are no aggressive osseous lesions.   Impression    IMPRESSION:   1. Interval improvement in pericardial effusion and left pleural effusion   2. Interval resolution of consolidation/atelectasis involving the left lung lingula and lower lobe   3. Interval decrease in size of mediastinal hilar and axillary lymph nodes   4. A 2 mm nodule in the right lung upper lobe. Per the Fleischner Society Guidelines (Radiology   2017), for patients at low risk for lung cancer no further imaging follow-up is suggested. For   patients at higher risk, such as smokers, a follow-up chest CT in 12 months is suggested. In the   setting of known malignancy or an immunocompromise patient, follow-up chest CT in 3-6 months is   suggested.     PROCEDURE:  CT CHEST+ABDOMEN+PELVIS(ALL CNTRST ONLY)(CPT=71260/94118)       COMPARISON:  [facility] , CT ANGIOGRAPHY, CHEST (CPT=71275), 12/20/2018, 21:04.  [facility] , CT ANGIOGRAPHY, CHEST (CPT=71275), 5/24/2019, 21:25.       INDICATIONS:  M79.10 Myalgia, unspecified site       TECHNIQUE:  IV contrast-enhanced scanning through the chest, abdomen, and pelvis was performed.  Dose reduction techniques were used. Dose information is transmitted to the ACR (American College of Radiology) NRDR (National Radiology Data Registry) which    includes the Dose Index Registry.       PATIENT STATED HISTORY:(As transcribed by Technologist)  Patient states she had pleurisy in October with swollen lymphnodes in upper axillary chest. Patient now has pain to left chest with a deep breath.        CONTRAST USED:  89cc of Omnipaque 350       FINDINGS:         CHEST:         LUNGS/PLEURA:  No active or acute process seen.  No pulmonary mass or nodule.  Minimal dependent atelectasis left lower lobe.  No pleural effusion.       THORACIC AORTA:  No aneurysm or other acute process       MEDIASTINUM/BETZY:  Mild enlarged hilar lymph nodes including on the right on image 35 a 13 mm lymph node transverse dimension, stable as remeasured by myself.       CARDIAC:  Unremarkable.       CHEST  WALL:  Unremarkable.       OTHER:  Enlarged bilateral axillary lymph nodes.  Showing no change when compared with a very recent CT exam of the chest from 5/24/2019, the largest lymph node as remeasured myself, stable in size between the current on the prior exam, 27 x 18 mm series    2, image 22 and 23 current exam, series 4, image 64 prior exam.  Additional bilateral enlarged lymph nodes are also stable.       ABDOMEN/PELVIS:       LIVER:  Unremarkable.       BILIARY:  Unremarkable.       PANCREAS:  Unremarkable.       SPLEEN:  No splenomegaly.       KIDNEYS:  No acute abnormality.       ADRENALS:  Unremarkable.       AORTA/VASCULAR:  No aortic aneurysm.       RETROPERITONEUM:  Numerous retroperitoneal lymph nodes are seen, most of which are sub cm, with 1 on series 2, image 156 just to the left of the left common iliac artery measuring 11 x 11 mm.  Another lymph node to the right of the iliac bifurcation on   image 173 measures 13 x 12 mm.       BOWEL/MESENTERY:  No acute process.       ABDOMINAL WALL:  Unremarkable.       URINARY BLADDER:  Unremarkable.       PELVIC NODES:  Pelvic sidewall enlarged lymph nodes including on the right image 191 measuring 14 x 24 mm and on the left measuring 11 x 19 mm image 194.       PELVIC ORGANS:  Heterogeneous appearance of the uterus, with a heterogeneous mass outside of the endometrial cavity, distorting the endometrial cavity measuring 6.6 cm, most typical for uterine fibroid.  Left adnexal cyst likely of ovarian origin maximum    dimension 3.2 cm, and medial posterior right adnexal cystic mass also likely of ovarian origin 2.1 cm.  Trace free pelvic fluid.         OTHER:  Scattered mildly enlarged inguinal lymph nodes..       SKELETAL STRUCTURES IN THE CHEST/ABDOMEN/PELVIS:       BONES:  No acute abnormality.           Impression   CONCLUSION:         Enlarged lymph nodes in the chest, abdomen, pelvis including axilla, to a lesser extent right hilum, with mild enlarged  lymph nodes in the lower retroperitoneum along the iliac arteries, subcentimeter retroperitoneal lymph nodes, and then enlarged pelvic    sidewall lymph nodes and mildly enlarged inguinal lymph nodes.  The spleen does not appear enlarged.  No pleural effusion, ascites or pneumonia.  These lymph nodes are indeterminate, could reflect inflammatory etiology, with lymphoma also considered.     Consider biopsy of accessible lymph nodes.       Uterine fibroid.  Bilateral adnexal cystic lesions are most likely ovarian cyst.  Consider obtaining pelvic ultrasound to better assess these pelvic abnormalities.           Dictated by: Roosevelt Saldivar MD on 6/08/2019 at 14:27       Approved by: Roosevelt Saldivar MD            Labs:  Lab Results   Component Value Date    WBC 2.9 (L) 04/23/2025    RBC 3.84 04/23/2025    HGB 11.0 (L) 04/23/2025    HCT 34.3 (L) 04/23/2025    .0 04/23/2025    MCV 89.3 04/23/2025    MCH 28.6 04/23/2025    MCHC 32.1 04/23/2025    RDW 14.0 04/23/2025    NEPRELIM 1.22 (L) 04/23/2025    NEPERCENT 42.3 04/23/2025    LYPERCENT 40.1 04/23/2025    MOPERCENT 15.2 04/23/2025    EOPERCENT 1.4 04/23/2025    BAPERCENT 0.7 04/23/2025    NE 1.22 (L) 04/23/2025    LYMABS 1.16 04/23/2025    MOABSO 0.44 04/23/2025    EOABSO 0.04 04/23/2025    BAABSO 0.02 04/23/2025     Lab Results   Component Value Date    GLU 75 04/23/2025    BUN 14 04/23/2025    BUNCREA 9.0 (L) 05/11/2024    CREATSERUM 0.85 04/23/2025    ANIONGAP 7 04/23/2025    GFRNAA 85 07/29/2022    GFRAA 98 07/29/2022    CA 9.6 04/23/2025    OSMOCALC 289 04/23/2025    ALKPHO 54 04/23/2025    AST 16 04/23/2025    ALT 10 04/23/2025    BILT 0.4 04/23/2025    TP 8.0 04/23/2025    ALB 4.4 04/23/2025    GLOBULIN 3.6 (H) 04/23/2025     04/23/2025    K 4.0 04/23/2025     04/23/2025    CO2 27.0 04/23/2025     2D ECHO 01/2020  Conclusions:     1. Left ventricle: The cavity size was normal. Systolic function was normal.      The estimated ejection fraction  was 55%.   2. Pericardium, extracardiac: A trivial to small pericardial effusion was      identified. There was no evidence of hemodynamic compromise. Pericardial      effusion size appears to be less compared to previous echocardiogram.     Impressions:  Compared to the previous study, these findings represent   improvement.     Additional Labs:  04/2024  C4 11.4 low  C3 77.3 low  ESR 31 borderline elevated  CRP normal  CMP grossly normal  CBC with WBC 2.9; hemoglobin 11; MCV 89.3; platelet 329    01/2025  CBC with WBC 2.9; Hgb 11.5; plt 364  UA with trace blood; 70 protein  IgA 523.8 elevated  IgG 2207 elevated  IgM 98.4 normal  Random urine protein 56.4; urine protein to creatinine ratio 0.10  dsDNA 160  Ferritin 524 elevated  C3 88.8 normal  C4 14.6 normal  CMP, ESR, CRP not done    10/2024  CBC with WBC 2.8; Hgb 10.9; plt 364  CMP grossly normal   ESR 33 borderline   CRP normal    08/2024  CBC with WBC 4.1; Hgb 11.4; plt 367  CMP grossly normal   IgA 286.80 elevated  ESR 39 borderline   CRP normal    07/2024  CBC with WBC 3.8; Hgb 11.5; plt 339  UA grossly negative  Urine protein to creatinine ratio 0.05 normal  IgA 501.1 elevated  IgG 2215 elevated  IgM 97.6 normal   dsDNA 160 elevated   ESR 60 borderline   CRP 1.20 elevated    C4 15.1 normal  C3 86.4 normal   Ferritin 32.1 normal     04/2024  CBC with WBC 2.5; Hgb 10.7; plt 368  UA grossly negative  Urine protein to creatinine ratio 0.06 normal  IgA 388.10 elevated  IgG 1824 elevated  IgM 77 normal   dsDNA 160 elevated   ESR 40 borderline   CRP normal   C4 15 normal  C3 79.8 borderline low   Ferritin/iron studies  B12 821 normal  Vit D 59 normal.     02/2024  CMP grossly normal  C3 78 low  C4 16.1 normal  UA grossly negative  Urine protein to creatinine ratio 0.06 normal  Iron studies low normal  TPMT pending     01/2024  dsDNA 1: 640  CBC with WBC 2.9; Hgb 11.9; plt 288; MCV 85  IgA 404 elevated  IgG 1770 elevated  IgM 87.1  ESR 49 elevated  CRP 1.03  elevated  Ferritin 36.1    01/2024  CBC with WBC 2.9; Hgb 11.9; plt 288; MCV 85  IgA 404 elevated  IgG 1770 elevated  IgM 87.1  ESR 49 elevated  CRP 1.03 elevated  Ferritin 36.1      10/2023  CBC with WBC 4.1; hemoglobin 12.5; platelet 332; MCV 85.5  CMP grossly normal  Iron 51; percent sat 14 low  IgA 350 elevated  IgG 1530 normal  IgM 64.2 normal  Urine protein to creatinine ratio normal  ESR 43 elevated  CRP 1.0 elevated  C3 97.2 normal  C4 25.3 normal  B12 1111 elevated  Ferritin 34.9 normal  Vitamin D 24.8 Low   normal  Homocysteine 8.4 normal  dsDNA 1: 1280 high positive    07/2023  Ferritin normal  Iron 24; percent sat 7 low  Urine protein to creatinine ratio 0.11  UA grossly negative  dsDNA 1:640  C4 25.8 normal  C3 107 normal  ESR 35 borderline elevated  CRP 1.32 elevated  CMP grossly normal  CBC with WBC 4.1; hemoglobin 11.4; MCV 83.6; platelet 318    04/2023  GURDEEP screen positive  KAILEE panel with SSA >240  dsDNA 80 positive   Ferritin 12.1 borderline low  Iron 43; percent sat 12 both low  Vitamin D 49.9 normal  ESR 29 borderline elevated  CRP 0.39 borderline elevated  CMP grossly normal  CBC with WBC 3.4; hemoglobin 11.5; platelet 340    01/2023  SSA >240.0 positive  SSB, Brown, Nuha 1 RNP, RNP 70, centromere, SCL 70, Nuha 1 negative  Vitamin D 22.6 low  Urine protein to creatinine ratio 0.12 normal  UA with trace protein otherwise grossly negative  dsDNA IgG 117 positive  ESR 34 borderline elevated  CRP 0.80 borderline elevated  CMP grossly normal  CBC with WBC 4.0; Hgb 11.3; plt 308    10/2022  UA protein 30; small leuk esterase and few squamous cells otherwise negative  dsDNA 1:320  C4 24.8 normal  C3 106 normal  Vitamin D 25.6 low  ESR 31 elevated  CRP 0.33 borderline  CMP grossly normal  CBC with WBC 4.4; hemoglobin 11.9; platelet 375  Urine protein to creatinine ratio 0.14    07/2022  Ferritin 13.2 borderline low  Iron and percent saturation low  C4 24.8 normal  C3 111 normal  ESR 18 normal  CRP  0.68 borderline elevated  CMP grossly normal  CBC with WBC 3.6; hemoglobin 11.2; platelet 372  Antiphospholipid antibodies pending  dsDNA 1: 640    3/2022  Vit D 37.5  Iron and % sat low   Ferritin 14 normal   CBC with WBC 5.1 ; hemoglobin 11.5; MCV 83.6; platelet 369  CMP grossly normal  ESR 20 normal   CRP normal   DsDNA 1:1280    01/2022  CBC with WBC 7.4; hemoglobin 11.9; MCV 84.7; platelet 367  CMP grossly normal  CRP 1.04 elevated  ESR 25 borderline  dsDNA 1: 2560  C4 20.1 normal  C3 105 normal  Vitamin D 16.1 low  LAC negative  ACL negative  B2G IgM 9.7 equivocal; IgG negative  Urine protein creatinine ratio 0.16  UA negative for protein or blood (small leuk esterase)    06/2021  UA grossly normal  Smith negative  ESR 20  C4 18.6  C3 103  dsDNA 276  GURDEEP screen positive no titer  CMP grossly normal  CBC grossly normal    08/2021   QuantiFERON TB negative    04/2021  Vitamin D 28.4 low    09/2019   RIMMA virus negative  Smooth muscle antibody negative  Aldolase normal  Serum ACE normal  Lupus anticoagulant positive    QuantiFERON-TB indeterminant  Ferritin 235 elevated  Iron normal    Germaine Ross DO  [facility]  04/29/2025

## 2025-04-29 NOTE — PATIENT INSTRUCTIONS
-- depomedrol injection in office  -- start prednisone 20mg daily x 1 week then 10mg daily x 1 week  -- after the higher dose prednisone, get updated labs  -- urine testing in office today  -- increase azathioprine to 75mg daily  -- we'll be in touch in about two weeks to see how you're feeling  -- if the change in lupus meds doesn't help, consider low dose antidepressant to help with pain (look into duloxetine vs amitriptyline)   -- continue follow up every 3 months  -- will be in communication in the meantime    Dr. Ross

## 2025-05-19 NOTE — PROGRESS NOTES
The following individual(s) verbally consented to be recorded using ambient AI listening technology and understand that they can each withdraw their consent to this listening technology at any point by asking the clinician to turn off or pause the recording:    Patient name: Eleanor Dinhei  Additional names:

## 2025-05-19 NOTE — PATIENT INSTRUCTIONS
Skin testing today to common indoor and outdoor environmental allergens was positive to trees grass weeds cats dogs on scratch testing.  Patient deferred intradermal testing    Skin testing today to select foods including milk egg wheat soy peanut tree nut panel and garlic was negative    Food allergies  Exhibits symptoms such as finger swelling and pleurisy after consuming foods like garlic, processed foods, and soy sauce, suggestive of food allergies or possible intolerances.   Patient with concern that these foods may be triggering her lupus.    Differentiated between IgE-mediated food allergies and food intolerances. Discussed common allergens and testing limitations for processed foods and chemicals. Noted that 90% of food allergies are due to milk, egg, wheat, soy, nuts, seafood, and seeds.  - Perform skin testing for food allergies using eczema panel (milk, egg white, egg yolk, wheat, soy, almond, walnut, peanut).  - Consider full nut panel if necessary.  - Test for soy allergy.  - Test for garlic allergy.  If skin testing is positive will recommend a trial avoidance from an allergy perspective.  If skin testing is negative and food are still triggering symptoms I would recommend to avoid from a food intolerance perspective.    Seasonal allergies  Exhibits symptoms of seasonal allergies and possible oral allergy syndrome due to cross-reactivity with pollen, including itchy mouth, tongue, and throat after consuming certain fresh fruits and vegetables. Explained that oral allergy syndrome typically causes milder symptoms within the oral mucosa and is not anaphylactic.  - Perform skin testing for environmental allergens.  - Consider intradermal testing if initial results indicate.  May consider Zyrtec 10 mg or Xyzal 5 mg as a nonsedating antihistamine  May add Flonase or Nasacort 2 sprays per nostril once a day if having prominent nasal congestion or postnasal drip    Recurrent sinus infections  Experiences  recurrent sinus infections since childhood. No recent ENT evaluation as an adult. Possible need for ENT referral if infections persist.  - Consider ENT referral if infections continue.  See above skin testing to screen for allergic triggers    Lupus  Diagnosed in 2018, currently experiencing persistent symptoms exacerbated by certain foods. No current oral or tongue ulcers associated with lupus.  - Continue current medications: hydroxychloroquine, mesothioprine, prednisone.    Follow-up with rheumatologist as scheduled

## 2025-05-19 NOTE — PROGRESS NOTES
Eleanor Martinez is a 30 year old female.    HPI:   No chief complaint on file.    Patient is a 30-year-old female who presents for allergy consultation upon referral of her PCP with a chief complaint of allergies      Reason for visit notes concern for potential adverse reaction to foods.  Notes history of lupus.  Medication list include azathioprine and Plaquenil    Immunizations reviewed  COVID-vaccine x 3 doses.  Last in February 2022  Last flu vaccine from 2021.  No pneumonia vaccine on record  Today patient reports        History of Present Illness  Eleanor Martinez is a 30 year old female with lupus who presents with food-related symptoms and possible allergies.    She was diagnosed with lupus in 2018 and experiences persistent symptoms that worsen with certain foods. Foods such as garlic, processed foods with red or yellow dyes, and Chinese food, possibly due to soy sauce, lead to finger swelling and sometimes pleurisy.    She experiences tongue peeling within 10-15 minutes after consuming certain foods, a symptom present for the last year and a half. This is particularly irritating when eating harder foods.    She has a history of seasonal allergies and recurrent sinus infections since childhood, though she has not seen an ear, nose, and throat specialist as an adult.    Her current medications include hydroxychloroquine, mesothioprine, and prednisone, with a recent increase to 10 mg during a flare-up.    She is from [facility] and mentions that her diet includes ground nut (peanuts).    No recent anesthesia use and no mouth or tongue ulcers associated with lupus.         HISTORY:  Past Medical History[1]   Past Surgical History[2]   Family History[3]   Social History: Short Social Hx on File[4]     Medications (Active prior to today's visit):  Current Medications[5]    Allergies:  Allergies[6]      ROS:     Allergic/Immuno:  See HPI  Cardiovascular:  Negative for irregular heartbeat/palpitations, chest  pain, edema  Constitutional:  Negative night sweats,weight loss, irritability and lethargy  Endocrine:  Negative for cold intolerance, polydipsia and polyphagia  ENMT:  Negative for ear drainage, hearing loss and nasal drainage  Eyes:  Negative for eye discharge and vision loss  Gastrointestinal:  Negative for abdominal pain, diarrhea and vomiting  Genitourinary:  Negative for dysuria and hematuria  Hema/Lymph:  Negative for easy bleeding and easy bruising  Integumentary:  Negative for pruritus and rash  Musculoskeletal:  Negative for joint symptoms  Neurological:  Negative for dizziness, seizures  Psychiatric:  Negative for inappropriate interaction and psychiatric symptoms  Respiratory:  Negative for cough, dyspnea and wheezing      PHYSICAL EXAM:   Constitutional: responsive, no acute distress noted  Head/Face: NC/Atraumatic  Eyes/Vision: conjunctiva and lids are normal extraocular motion is intact   Ears/Audiometry: tympanic membranes are normal bilaterally hearing is grossly intact  Nose/Mouth/Throat: nose and throat are clear mucous membranes are moist   Neck/Thyroid: neck is supple without adenopathy  Lymphatic: no abnormal cervical, supraclavicular or axillary adenopathy is noted  Respiratory: normal to inspection lungs are clear to auscultation bilaterally normal respiratory effort   Cardiovascular: regular rate and rhythm no murmurs, gallups, or rubs  Abdomen: soft non-tender non-distended  Skin/Hair: no unusual rashes present  Extremities: no edema, cyanosis, or clubbing  Neurological:Oriented to time, place, person & situation       ASSESSMENT/PLAN:   Assessment   Encounter Diagnoses   Name Primary?    Food allergy Yes    Adverse food reaction, initial encounter     Flu vaccine need     Need for COVID-19 vaccine     Need for prophylactic vaccination with Streptococcus pneumoniae (Pneumococcus) and Influenza vaccines     Seasonal and perennial allergic rhinoconjunctivitis    Skin testing today to common  indoor and outdoor environmental allergens was positive to trees grass weeds cats dogs on scratch testing.  Patient deferred intradermal testing    Skin testing today to select foods including milk egg wheat soy peanut tree nut panel and garlic was negative    Positive histamine control    Assessment & Plan     Assessment & Plan  Food allergies  Exhibits symptoms such as finger swelling and pleurisy after consuming foods like garlic, processed foods, and soy sauce, suggestive of food allergies or possible intolerances.   Patient with concern that these foods may be triggering her lupus.    Differentiated between IgE-mediated food allergies and food intolerances. Discussed common allergens and testing limitations for processed foods and chemicals. Noted that 90% of food allergies are due to milk, egg, wheat, soy, nuts, seafood, and seeds.  - Perform skin testing for food allergies using eczema panel (milk, egg white, egg yolk, wheat, soy, almond, walnut, peanut).  - Consider full nut panel if necessary.  - Test for soy allergy.  - Test for garlic allergy.  If skin testing is positive will recommend a trial avoidance from an allergy perspective.  If skin testing is negative and food are still triggering symptoms I would recommend to avoid from a food intolerance perspective.    Seasonal allergies  Exhibits symptoms of seasonal allergies and possible oral allergy syndrome due to cross-reactivity with pollen, including itchy mouth, tongue, and throat after consuming certain fresh fruits and vegetables. Explained that oral allergy syndrome typically causes milder symptoms within the oral mucosa and is not anaphylactic.  - Perform skin testing for environmental allergens.  - Consider intradermal testing if initial results indicate.  May consider Zyrtec 10 mg or Xyzal 5 mg as a nonsedating antihistamine  May add Flonase or Nasacort 2 sprays per nostril once a day if having prominent nasal congestion or postnasal  drip    Recurrent sinus infections  Experiences recurrent sinus infections since childhood. No recent ENT evaluation as an adult. Possible need for ENT referral if infections persist.  - Consider ENT referral if infections continue.  See above skin testing to screen for allergic triggers    Lupus  Diagnosed in 2018, currently experiencing persistent symptoms exacerbated by certain foods. No current oral or tongue ulcers associated with lupus.  - Continue current medications: hydroxychloroquine, mesothioprine, prednisone.    Follow-up with rheumatologist as scheduled          Orders This Visit:  No orders of the defined types were placed in this encounter.      Meds This Visit:  Requested Prescriptions      No prescriptions requested or ordered in this encounter       Imaging & Referrals:  None     5/19/2025  Gautam Vargas MD      If medication samples were provided today, they were provided solely for patient education and training related to self administration of these medications.  Teaching, instruction and sample was provided to the patient by myself.  Teaching included  a review of potential adverse side effects as well as potential efficacy.  Patient's questions were answered in regards to medication administration and dosing and potential side effects. Teaching was provided via the teach back method         [1]   Past Medical History:   Abdominal pain    Allergic rhinitis    Anemia    Anxiety    Back pain    Belching    Bloating    Blood in the stool    Change in hair    Chest pain    Chest pain on exertion    Constipation    Diarrhea, unspecified    Easy bruising    Esophageal reflux    Fatigue    Flatulence/gas pain/belching    Food intolerance    Frequent use of laxatives    Headache disorder    Heartburn    Indigestion    Irregular bowel habits    Lupus (systemic lupus erythematosus) (HCC)    Menses painful    Pain in joints    Pleural effusion    Problems with swallowing    Not often only when  drinking large amounts of water worh medication    Shortness of breath    Sleep disturbance    Vomiting    Extreme cases    Wears glasses    Weight gain   [2]   Past Surgical History:  Procedure Laterality Date    Colonoscopy     [3]   Family History  Problem Relation Age of Onset    Diabetes Mother     Asthma Mother     Allergies Mother         fam hx   [4]   Social History  Socioeconomic History    Marital status: Single   Tobacco Use    Smoking status: Never    Smokeless tobacco: Never   Vaping Use    Vaping status: Never Used   Substance and Sexual Activity    Alcohol use: Yes     Alcohol/week: 1.0 standard drink of alcohol     Types: 1 Glasses of wine per week     Comment: very minimal    Drug use: No    Sexual activity: Never   Other Topics Concern    Caffeine Concern No    Weight Concern Yes    Exercise Yes     Social Drivers of Health      Received from [facility]    Housing Stability   [5]   Current Outpatient Medications   Medication Sig Dispense Refill    azaTHIOprine 75 MG Oral Tab Take 1 tablet (75 mg total) by mouth daily. 90 tablet 0    hydroxychloroquine 200 MG Oral Tab Take 1 tablet (200 mg total) by mouth 2 (two) times daily. 180 tablet 0    predniSONE 5 MG Oral Tab Take 1 tablet (5 mg total) by mouth daily. 90 tablet 0    amphetamine-dextroamphetamine ER 10 MG Oral Capsule SR 24 Hr Take 1 capsule (10 mg total) by mouth daily as needed.      ERGOCALCIFEROL 1.25 MG (60414 UT) Oral Cap TAKE 1 CAPSULE BY MOUTH 1 TIME A WEEK 12 capsule 0    predniSONE 10 MG Oral Tab Take 20mg daily x 1 week then 10mg daily x 1 week, the resume prior 5mg daily (Patient not taking: Reported on 5/19/2025) 21 tablet 0    FEROSUL 325 (65 Fe) MG Oral Tab TAKE 1 TABLET BY MOUTH TWICE DAILY WITH MEALS (Patient taking differently: Take 1 tablet (325 mg total) by mouth daily with breakfast.) 180 tablet 0   [6] No Known Allergies

## 2025-06-09 NOTE — TELEPHONE ENCOUNTER
Received call from [facility] regarding refill of Vitamin D2 and Prednisone 2.5mg.   Informed office waiting for response to [facility] regarding need for refill of Vitamin D. RX for Prednisone for 5mg, reviewed chart and noted the following LOV: 4/29/25 predniSONE 10 MG Oral Tab; Take 20mg daily x 1 week then 10mg daily x 1 week, the resume prior 5mg daily.  States she will contact patient for clarification.

## 2025-07-21 NOTE — TELEPHONE ENCOUNTER
MCM sent to pt stating additional lab work as well as a urine test has been ordered. Awaiting possible response.

## 2025-07-21 NOTE — TELEPHONE ENCOUNTER
Pt called the office stating when going for bloodwork, they only took one tube. Was calling the office to make sure if Dr Ross would like any further testing to be complete prior to next appointment.     Dr Ross-- see above and advise. Currently only a CBC and a Sed rate was collected.

## 2025-07-21 NOTE — PROGRESS NOTES
[facility] Pulmonary Follow Up Note    Chief Complaint:  Chief Complaint   Patient presents with    Follow - Up     Follow up on right heart cath       History of Present Illness:  History of Present Illness  Eleanor Martinez is a 30 year old female who presents for follow up of persistent shortness of breath and exertion in the setting of lupus.    She experiences persistent shortness of breath, particularly on exertion and when bending down, describing it as 'difficulty breathing'. Despite a right heart catheterization showing normal results, she continues to experience symptoms.    She has a new symptom of a 'hiccup' or jump in her chest when starting to fall asleep or getting very relaxed, occurring during activities like lying back for relaxation.    Her medication regimen was adjusted by rheumatology, switching from CellCept to azathioprine, with a recent increase in dosage to 75 mg from 50 mg. Her symptoms have remained stable, though she feels they might be slightly worse due to her tolerance to discomfort.    She inquired about the potential impact of her three large fibroids on her breathing difficulties, particularly in relation to iron deficiency and anemia, which could affect pulmonary function.         Past Medical History:   Past Medical History[1]     Past Surgical History:   Past Surgical History[2]    Family Medical History: Family History[3]     Social History:   Social History     Socioeconomic History    Marital status: Single     Spouse name: Not on file    Number of children: Not on file    Years of education: Not on file    Highest education level: Not on file   Occupational History    Not on file   Tobacco Use    Smoking status: Never    Smokeless tobacco: Never   Vaping Use    Vaping status: Never Used   Substance and Sexual Activity    Alcohol use: Not Currently     Alcohol/week: 1.0 standard drink of alcohol     Types: 1 Glasses of wine per week     Comment: very minimal    Drug use: No     Sexual activity: Never   Other Topics Concern     Service Not Asked    Blood Transfusions Not Asked    Caffeine Concern No    Occupational Exposure Not Asked    Hobby Hazards Not Asked    Sleep Concern Not Asked    Stress Concern Not Asked    Weight Concern Yes    Special Diet Not Asked    Back Care Not Asked    Exercise Yes    Bike Helmet Not Asked    Seat Belt Not Asked    Self-Exams Not Asked   Social History Narrative    Not on file     Social Drivers of Health     Food Insecurity: Not on file   Transportation Needs: Not on file   Stress: Not on file   Housing Stability: Low Risk  (6/14/2024)    Received from [facility]    Housing Stability     Mortgage Payment Concerns?: Not on file     Number of Places Lived in the Last Year: Not on file     Unstable Housing?: Not on file        Medications: Current Medications[4]    Review of Systems: Review of Systems     Physical Exam:  Resp 16   Ht 5' 3\" (1.6 m)   Wt 165 lb (74.8 kg)   LMP 03/17/2025 (Approximate)   BMI 29.23 kg/m²      Constitutional: alert, cooperative. No acute distress.  HEENT: Head NC/AT. Nares normal. Septum midline. Mucosa normal. No drainage or sinus tenderness.  Cardio: Regular rate and rhythm. Normal S1 and S2. No murmurs.   Respiratory: Thorax symmetrical with no labored breathing. clear to auscultation bilaterally  Extremities: No clubbing or cyanosis. No BLE edema.    Neurologic: A&Ox3. No gross motor deficits.  Skin: Warm, dry  Psych: Calm, cooperative. Pleasant affect.    Results:  Images personally reviewed - reading is my own personal review  Results  Right heart catheterization: Normal       PFTs:       No data to display                   No data to display                    WBC: 3, done on 7/21/2025.  HGB: 10.4, done on 7/21/2025.  PLT: 295, done on 7/21/2025.     Glucose: 70, done on 5/23/2025.  Cr: 0.88, done on 5/23/2025.  Last eGFR was 90 on 5/23/2025.  CA: 9.1, done on 5/23/2025.  Na: 140, done on  5/23/2025.  K: 3.9, done on 5/23/2025.  Cl: 107, done on 5/23/2025.  CO2: 25, done on 5/23/2025.  Last ALB was 4.2% done on 5/23/2025.     No results found.     Assessment/Plan:  Assessment & Plan  Shortness of breath  Persistent dyspnea on exertion and orthopnea. Right heart catheterization normal, excluding pulmonary hypertension. Symptoms slightly worsening. Differential includes asthma, though unlikely.  - Prescribe Breztri inhaler, to be used twice daily.  - Monitor response to inhaler for improvement in FEV1 and FVC.    Sleep apnea evaluation  Reports sensation similar to hiccup or chest jump when relaxed or dozing off. Consider sleep apnea as a potential cause.  - Order sleep study to evaluate for sleep apnea.    Uterine fibroids  Presence of three large uterine fibroids. Discussion on potential contribution to dyspnea through iron deficiency anemia affecting pulmonary function.  - Evaluate for iron deficiency anemia as a potential contributor to symptoms.         Return in about 3 months (around 10/21/2025).    I spent 32 minutes obtaining and reviewing records, preparing for the visit including reviewing chart and prior testing, face to face time examining the patient and obtaining history, counseling, arranging and reviewing office-based testing, independently reviewing relevant studies and documentation exclusive of other billable procedures.      Jelani Jim MD  7/21/2025         [1]   Past Medical History:   Abdominal pain    Allergic rhinitis    Anemia    Anxiety    Back pain    Belching    Bloating    Blood in the stool    Change in hair    Chest pain    Chest pain on exertion    Constipation    Diarrhea, unspecified    Easy bruising    Esophageal reflux    Fatigue    Flatulence/gas pain/belching    Food intolerance    Frequent use of laxatives    Headache disorder    Heartburn    Indigestion    Irregular bowel habits    Lupus (systemic lupus erythematosus) (HCC)    Menses painful    Pain  in joints    Pleural effusion    Problems with swallowing    Not often only when drinking large amounts of water worh medication    Shortness of breath    Sleep disturbance    Vomiting    Extreme cases    Wears glasses    Weight gain   [2]   Past Surgical History:  Procedure Laterality Date    Colonoscopy     [3]   Family History  Problem Relation Age of Onset    Diabetes Father         Pre-diabetic    Diabetes Mother     Asthma Mother     Allergies Mother         fam hx    Anemia Mother    [4]   Current Outpatient Medications   Medication Sig Dispense Refill    budeson-glycopyrrol-formoterol (BREZTRI AEROSPHERE) 160-9-4.8 MCG/ACT Inhalation Aerosol Inhale 2 puffs into the lungs 2 (two) times daily. 10.7 g 5    azaTHIOprine 75 MG Oral Tab Take 1 tablet (75 mg total) by mouth daily. 90 tablet 0    hydroxychloroquine 200 MG Oral Tab Take 1 tablet (200 mg total) by mouth 2 (two) times daily. 180 tablet 0    predniSONE 5 MG Oral Tab Take 1 tablet (5 mg total) by mouth daily. 90 tablet 0    FEROSUL 325 (65 Fe) MG Oral Tab TAKE 1 TABLET BY MOUTH TWICE DAILY WITH MEALS (Patient taking differently: Take 1 tablet (325 mg total) by mouth daily with breakfast.) 180 tablet 0    amphetamine-dextroamphetamine ER 10 MG Oral Capsule SR 24 Hr Take 1 capsule (10 mg total) by mouth daily as needed.      ERGOCALCIFEROL 1.25 MG (28948 UT) Oral Cap TAKE 1 CAPSULE BY MOUTH 1 TIME A WEEK 12 capsule 0    predniSONE 10 MG Oral Tab Take 20mg daily x 1 week then 10mg daily x 1 week, the resume prior 5mg daily (Patient not taking: Reported on 5/19/2025) 21 tablet 0

## 2025-07-21 NOTE — PROGRESS NOTES
The following individual(s) verbally consented to be recorded using ambient AI listening technology and understand that they can each withdraw their consent to this listening technology at any point by asking the clinician to turn off or pause the recording:    Patient name: Eleanor Dinhei  Additional names:

## (undated) DIAGNOSIS — M32.12 SYSTEMIC LUPUS ERYTHEMATOSUS (SLE) WITH PERICARDITIS, UNSPECIFIED SLE TYPE (HCC): Primary | ICD-10-CM

## (undated) NOTE — Clinical Note
Please work on updated Benlysta order- she will be on every 2 week injections instead of monthly.    Yulissa Griffith,  [facility] 10/26/2022

## (undated) NOTE — ED AVS SNAPSHOT
Alma Myers   MRN: [id number]    Department:  [facility] Emergency Department   Date of Visit:  12/20/2018           Disclosure     Insurance plans vary and the physician(s) referred by the ER may not be covered by your plan.  Please contact you tell this physician (or your personal doctor if your instructions are to return to your personal doctor) about any new or lasting problems. The primary care or specialist physician will see patients referred from the [facility] Emergency Department.  Gray Xiong

## (undated) NOTE — Clinical Note
I had the pleasure of seeing Ms. Joel Lao in my office today. Please see my attached note.     Deny Thakkar

## (undated) NOTE — LETTER
11/22/19        Eleanor Martinez  [address] [id number]      Dear Arelis Oneil records indicate that you have outstanding lab work and or testing that was ordered for you and has not yet been completed:  Orders Placed This Encounter      C

## (undated) NOTE — ED AVS SNAPSHOT
Henna Collins   MRN: [id number]    Department:  [facility] Emergency Department   Date of Visit:  8/11/2019           Disclosure     Insurance plans vary and the physician(s) referred by the ER may not be covered by your plan.  Please contact your tell this physician (or your personal doctor if your instructions are to return to your personal doctor) about any new or lasting problems. The primary care or specialist physician will see patients referred from the [facility] Emergency Department.  Doris Mayfield

## (undated) NOTE — LETTER
04/23/21        Eleanor MOSHER Juan  [address] [facility] [place] [id number]      Dear Yuliet Díaz records indicate that you have outstanding lab work and or testing that was ordered for you and has not yet been completed:  Fasting Lab Work   To schedule

## (undated) NOTE — ED AVS SNAPSHOT
Rehan Castor   MRN: [id number]    Department:  [facility] Emergency Department   Date of Visit:  5/24/2019           Disclosure     Insurance plans vary and the physician(s) referred by the ER may not be covered by your plan.  Please contact your tell this physician (or your personal doctor if your instructions are to return to your personal doctor) about any new or lasting problems. The primary care or specialist physician will see patients referred from the [facility] Emergency Department.  Lobo Rojas